# Patient Record
Sex: MALE | Race: WHITE | NOT HISPANIC OR LATINO | Employment: OTHER | URBAN - METROPOLITAN AREA
[De-identification: names, ages, dates, MRNs, and addresses within clinical notes are randomized per-mention and may not be internally consistent; named-entity substitution may affect disease eponyms.]

---

## 2017-07-19 ENCOUNTER — RECORDS - HEALTHEAST (OUTPATIENT)
Dept: LAB | Facility: CLINIC | Age: 82
End: 2017-07-19

## 2017-07-19 LAB — AST SERPL W P-5'-P-CCNC: 35 U/L (ref 0–40)

## 2017-10-18 ENCOUNTER — RECORDS - HEALTHEAST (OUTPATIENT)
Dept: LAB | Facility: CLINIC | Age: 82
End: 2017-10-18

## 2017-10-18 LAB
CHOLEST SERPL-MCNC: 176 MG/DL
FASTING STATUS PATIENT QL REPORTED: NORMAL
HDLC SERPL-MCNC: 67 MG/DL
LDLC SERPL CALC-MCNC: 104 MG/DL
TRIGL SERPL-MCNC: 27 MG/DL

## 2018-07-31 ENCOUNTER — RECORDS - HEALTHEAST (OUTPATIENT)
Dept: LAB | Facility: CLINIC | Age: 83
End: 2018-07-31

## 2018-07-31 LAB
ANION GAP SERPL CALCULATED.3IONS-SCNC: 10 MMOL/L (ref 5–18)
BUN SERPL-MCNC: 24 MG/DL (ref 8–28)
CALCIUM SERPL-MCNC: 9.2 MG/DL (ref 8.5–10.5)
CHLORIDE BLD-SCNC: 104 MMOL/L (ref 98–107)
CO2 SERPL-SCNC: 20 MMOL/L (ref 22–31)
CREAT SERPL-MCNC: 1.11 MG/DL (ref 0.7–1.3)
FERRITIN SERPL-MCNC: 31 NG/ML (ref 27–300)
GFR SERPL CREATININE-BSD FRML MDRD: >60 ML/MIN/1.73M2
GLUCOSE BLD-MCNC: 79 MG/DL (ref 70–125)
IRON SATN MFR SERPL: 13 % (ref 20–50)
IRON SERPL-MCNC: 39 UG/DL (ref 42–175)
POTASSIUM BLD-SCNC: 4.4 MMOL/L (ref 3.5–5)
SODIUM SERPL-SCNC: 134 MMOL/L (ref 136–145)
TIBC SERPL-MCNC: 311 UG/DL (ref 313–563)
TRANSFERRIN SERPL-MCNC: 249 MG/DL (ref 212–360)

## 2018-08-30 ENCOUNTER — RECORDS - HEALTHEAST (OUTPATIENT)
Dept: LAB | Facility: CLINIC | Age: 83
End: 2018-08-30

## 2018-08-30 LAB
ALBUMIN SERPL-MCNC: 3.5 G/DL (ref 3.5–5)
ALP SERPL-CCNC: 150 U/L (ref 45–120)
ALT SERPL W P-5'-P-CCNC: 21 U/L (ref 0–45)
ANION GAP SERPL CALCULATED.3IONS-SCNC: 9 MMOL/L (ref 5–18)
AST SERPL W P-5'-P-CCNC: 17 U/L (ref 0–40)
BILIRUB SERPL-MCNC: 0.6 MG/DL (ref 0–1)
BUN SERPL-MCNC: 19 MG/DL (ref 8–28)
CALCIUM SERPL-MCNC: 9.1 MG/DL (ref 8.5–10.5)
CHLORIDE BLD-SCNC: 104 MMOL/L (ref 98–107)
CO2 SERPL-SCNC: 24 MMOL/L (ref 22–31)
CREAT SERPL-MCNC: 0.98 MG/DL (ref 0.7–1.3)
GFR SERPL CREATININE-BSD FRML MDRD: >60 ML/MIN/1.73M2
GLUCOSE BLD-MCNC: 91 MG/DL (ref 70–125)
POTASSIUM BLD-SCNC: 4.3 MMOL/L (ref 3.5–5)
PROT SERPL-MCNC: 6.3 G/DL (ref 6–8)
SODIUM SERPL-SCNC: 137 MMOL/L (ref 136–145)

## 2018-09-06 ENCOUNTER — RECORDS - HEALTHEAST (OUTPATIENT)
Dept: LAB | Facility: CLINIC | Age: 83
End: 2018-09-06

## 2018-09-06 LAB
ANION GAP SERPL CALCULATED.3IONS-SCNC: 10 MMOL/L (ref 5–18)
BUN SERPL-MCNC: 24 MG/DL (ref 8–28)
CALCIUM SERPL-MCNC: 9.7 MG/DL (ref 8.5–10.5)
CHLORIDE BLD-SCNC: 101 MMOL/L (ref 98–107)
CO2 SERPL-SCNC: 26 MMOL/L (ref 22–31)
CREAT SERPL-MCNC: 1.2 MG/DL (ref 0.7–1.3)
GFR SERPL CREATININE-BSD FRML MDRD: 58 ML/MIN/1.73M2
GLUCOSE BLD-MCNC: 100 MG/DL (ref 70–125)
POTASSIUM BLD-SCNC: 4.5 MMOL/L (ref 3.5–5)
SODIUM SERPL-SCNC: 137 MMOL/L (ref 136–145)

## 2018-09-26 ENCOUNTER — RECORDS - HEALTHEAST (OUTPATIENT)
Dept: LAB | Facility: CLINIC | Age: 83
End: 2018-09-26

## 2018-09-26 LAB
ANION GAP SERPL CALCULATED.3IONS-SCNC: 10 MMOL/L (ref 5–18)
BUN SERPL-MCNC: 25 MG/DL (ref 8–28)
CALCIUM SERPL-MCNC: 9.7 MG/DL (ref 8.5–10.5)
CHLORIDE BLD-SCNC: 102 MMOL/L (ref 98–107)
CO2 SERPL-SCNC: 24 MMOL/L (ref 22–31)
CREAT SERPL-MCNC: 1.4 MG/DL (ref 0.7–1.3)
GFR SERPL CREATININE-BSD FRML MDRD: 48 ML/MIN/1.73M2
GLUCOSE BLD-MCNC: 87 MG/DL (ref 70–125)
POTASSIUM BLD-SCNC: 4.8 MMOL/L (ref 3.5–5)
SODIUM SERPL-SCNC: 136 MMOL/L (ref 136–145)

## 2018-10-09 ENCOUNTER — RECORDS - HEALTHEAST (OUTPATIENT)
Dept: LAB | Facility: CLINIC | Age: 83
End: 2018-10-09

## 2018-10-09 LAB
ANION GAP SERPL CALCULATED.3IONS-SCNC: 8 MMOL/L (ref 5–18)
BUN SERPL-MCNC: 21 MG/DL (ref 8–28)
CALCIUM SERPL-MCNC: 9.3 MG/DL (ref 8.5–10.5)
CHLORIDE BLD-SCNC: 102 MMOL/L (ref 98–107)
CO2 SERPL-SCNC: 27 MMOL/L (ref 22–31)
CREAT SERPL-MCNC: 1.29 MG/DL (ref 0.7–1.3)
GFR SERPL CREATININE-BSD FRML MDRD: 53 ML/MIN/1.73M2
GLUCOSE BLD-MCNC: 85 MG/DL (ref 70–125)
POTASSIUM BLD-SCNC: 4.5 MMOL/L (ref 3.5–5)
SODIUM SERPL-SCNC: 137 MMOL/L (ref 136–145)

## 2018-11-21 ENCOUNTER — RECORDS - HEALTHEAST (OUTPATIENT)
Dept: LAB | Facility: CLINIC | Age: 83
End: 2018-11-21

## 2018-11-21 LAB
ALBUMIN SERPL-MCNC: 3.5 G/DL (ref 3.5–5)
ALP SERPL-CCNC: 128 U/L (ref 45–120)
ALT SERPL W P-5'-P-CCNC: 18 U/L (ref 0–45)
ANION GAP SERPL CALCULATED.3IONS-SCNC: 12 MMOL/L (ref 5–18)
AST SERPL W P-5'-P-CCNC: 20 U/L (ref 0–40)
BILIRUB SERPL-MCNC: 0.5 MG/DL (ref 0–1)
BUN SERPL-MCNC: 24 MG/DL (ref 8–28)
CALCIUM SERPL-MCNC: 9.3 MG/DL (ref 8.5–10.5)
CHLORIDE BLD-SCNC: 104 MMOL/L (ref 98–107)
CO2 SERPL-SCNC: 24 MMOL/L (ref 22–31)
CREAT SERPL-MCNC: 1.19 MG/DL (ref 0.7–1.3)
GFR SERPL CREATININE-BSD FRML MDRD: 58 ML/MIN/1.73M2
GLUCOSE BLD-MCNC: 85 MG/DL (ref 70–125)
POTASSIUM BLD-SCNC: 3.9 MMOL/L (ref 3.5–5)
PROT SERPL-MCNC: 6.8 G/DL (ref 6–8)
SODIUM SERPL-SCNC: 140 MMOL/L (ref 136–145)

## 2019-01-16 ENCOUNTER — RECORDS - HEALTHEAST (OUTPATIENT)
Dept: LAB | Facility: CLINIC | Age: 84
End: 2019-01-16

## 2019-01-16 LAB
ANION GAP SERPL CALCULATED.3IONS-SCNC: 10 MMOL/L (ref 5–18)
BUN SERPL-MCNC: 22 MG/DL (ref 8–28)
CALCIUM SERPL-MCNC: 9.3 MG/DL (ref 8.5–10.5)
CHLORIDE BLD-SCNC: 102 MMOL/L (ref 98–107)
CO2 SERPL-SCNC: 25 MMOL/L (ref 22–31)
CREAT SERPL-MCNC: 1.15 MG/DL (ref 0.7–1.3)
GFR SERPL CREATININE-BSD FRML MDRD: >60 ML/MIN/1.73M2
GLUCOSE BLD-MCNC: 74 MG/DL (ref 70–125)
POTASSIUM BLD-SCNC: 4.5 MMOL/L (ref 3.5–5)
SODIUM SERPL-SCNC: 137 MMOL/L (ref 136–145)

## 2019-02-15 ENCOUNTER — RECORDS - HEALTHEAST (OUTPATIENT)
Dept: LAB | Facility: CLINIC | Age: 84
End: 2019-02-15

## 2019-02-15 LAB
ANION GAP SERPL CALCULATED.3IONS-SCNC: 11 MMOL/L (ref 5–18)
BUN SERPL-MCNC: 21 MG/DL (ref 8–28)
CALCIUM SERPL-MCNC: 9.1 MG/DL (ref 8.5–10.5)
CHLORIDE BLD-SCNC: 104 MMOL/L (ref 98–107)
CO2 SERPL-SCNC: 21 MMOL/L (ref 22–31)
CREAT SERPL-MCNC: 1.3 MG/DL (ref 0.7–1.3)
GFR SERPL CREATININE-BSD FRML MDRD: 53 ML/MIN/1.73M2
GLUCOSE BLD-MCNC: 95 MG/DL (ref 70–125)
POTASSIUM BLD-SCNC: 4.3 MMOL/L (ref 3.5–5)
SODIUM SERPL-SCNC: 136 MMOL/L (ref 136–145)

## 2019-05-17 ENCOUNTER — RECORDS - HEALTHEAST (OUTPATIENT)
Dept: LAB | Facility: CLINIC | Age: 84
End: 2019-05-17

## 2019-05-17 LAB
ANION GAP SERPL CALCULATED.3IONS-SCNC: 14 MMOL/L (ref 5–18)
BUN SERPL-MCNC: 22 MG/DL (ref 8–28)
CALCIUM SERPL-MCNC: 9.7 MG/DL (ref 8.5–10.5)
CHLORIDE BLD-SCNC: 98 MMOL/L (ref 98–107)
CO2 SERPL-SCNC: 20 MMOL/L (ref 22–31)
CREAT SERPL-MCNC: 1.29 MG/DL (ref 0.7–1.3)
GFR SERPL CREATININE-BSD FRML MDRD: 53 ML/MIN/1.73M2
GLUCOSE BLD-MCNC: 78 MG/DL (ref 70–125)
POTASSIUM BLD-SCNC: 4.6 MMOL/L (ref 3.5–5)
SODIUM SERPL-SCNC: 132 MMOL/L (ref 136–145)

## 2019-06-12 ENCOUNTER — RECORDS - HEALTHEAST (OUTPATIENT)
Dept: LAB | Facility: CLINIC | Age: 84
End: 2019-06-12

## 2019-06-12 LAB
APPEARANCE FLD: ABNORMAL
COLOR FLD: ABNORMAL
CRYSTALS SNV MICRO: NORMAL
EOSINOPHIL NFR FLD MANUAL: ABNORMAL %
LYMPHOCYTES NFR FLD MANUAL: 28 %
MACROPHAGE % - HISTORICAL: ABNORMAL %
MESOTHELIALS, FLUID: ABNORMAL %
MONOCYTE % - HISTORICAL: 2 %
NEUTS BAND NFR FLD MANUAL: 70 %
OTHER CELLS FLD MANUAL: ABNORMAL %
RBC FLUID - HISTORICAL: ABNORMAL /UL
WBC # FLD AUTO: 371 /UL (ref 0–99)

## 2019-06-15 LAB
BACTERIA SPEC CULT: NO GROWTH
BACTERIA SPEC CULT: NORMAL
GRAM STAIN RESULT: NORMAL
GRAM STAIN RESULT: NORMAL

## 2019-11-04 ENCOUNTER — RECORDS - HEALTHEAST (OUTPATIENT)
Dept: LAB | Facility: CLINIC | Age: 84
End: 2019-11-04

## 2019-11-04 LAB
ANION GAP SERPL CALCULATED.3IONS-SCNC: 9 MMOL/L (ref 5–18)
BUN SERPL-MCNC: 24 MG/DL (ref 8–28)
CALCIUM SERPL-MCNC: 8.7 MG/DL (ref 8.5–10.5)
CHLORIDE BLD-SCNC: 109 MMOL/L (ref 98–107)
CO2 SERPL-SCNC: 24 MMOL/L (ref 22–31)
CREAT SERPL-MCNC: 1.69 MG/DL (ref 0.7–1.3)
ERYTHROCYTE [DISTWIDTH] IN BLOOD BY AUTOMATED COUNT: 16.7 % (ref 11–14.5)
GFR SERPL CREATININE-BSD FRML MDRD: 39 ML/MIN/1.73M2
GLUCOSE BLD-MCNC: 94 MG/DL (ref 70–125)
HCT VFR BLD AUTO: 20.7 % (ref 40–54)
HGB BLD-MCNC: 6.4 G/DL (ref 14–18)
MCH RBC QN AUTO: 25.8 PG (ref 27–34)
MCHC RBC AUTO-ENTMCNC: 30.9 G/DL (ref 32–36)
MCV RBC AUTO: 84 FL (ref 80–100)
PLATELET # BLD AUTO: 411 THOU/UL (ref 140–440)
PMV BLD AUTO: 9.6 FL (ref 8.5–12.5)
POTASSIUM BLD-SCNC: 4.9 MMOL/L (ref 3.5–5)
RBC # BLD AUTO: 2.48 MILL/UL (ref 4.4–6.2)
SODIUM SERPL-SCNC: 142 MMOL/L (ref 136–145)
WBC: 6.9 THOU/UL (ref 4–11)

## 2019-11-18 ENCOUNTER — RECORDS - HEALTHEAST (OUTPATIENT)
Dept: LAB | Facility: CLINIC | Age: 84
End: 2019-11-18

## 2019-11-18 LAB
ANION GAP SERPL CALCULATED.3IONS-SCNC: 12 MMOL/L (ref 5–18)
BUN SERPL-MCNC: 33 MG/DL (ref 8–28)
CALCIUM SERPL-MCNC: 9.1 MG/DL (ref 8.5–10.5)
CHLORIDE BLD-SCNC: 102 MMOL/L (ref 98–107)
CO2 SERPL-SCNC: 24 MMOL/L (ref 22–31)
CREAT SERPL-MCNC: 2.16 MG/DL (ref 0.7–1.3)
GFR SERPL CREATININE-BSD FRML MDRD: 29 ML/MIN/1.73M2
GLUCOSE BLD-MCNC: 94 MG/DL (ref 70–125)
POTASSIUM BLD-SCNC: 4.5 MMOL/L (ref 3.5–5)
SODIUM SERPL-SCNC: 138 MMOL/L (ref 136–145)

## 2020-01-23 ENCOUNTER — RECORDS - HEALTHEAST (OUTPATIENT)
Dept: LAB | Facility: CLINIC | Age: 85
End: 2020-01-23

## 2020-01-23 ENCOUNTER — RECORDS - HEALTHEAST (OUTPATIENT)
Dept: ADMINISTRATIVE | Facility: OTHER | Age: 85
End: 2020-01-23

## 2020-01-23 LAB
ANION GAP SERPL CALCULATED.3IONS-SCNC: 13 MMOL/L (ref 5–18)
BUN SERPL-MCNC: 22 MG/DL (ref 8–28)
CALCIUM SERPL-MCNC: 8.8 MG/DL (ref 8.5–10.5)
CHLORIDE BLD-SCNC: 102 MMOL/L (ref 98–107)
CO2 SERPL-SCNC: 23 MMOL/L (ref 22–31)
CREAT SERPL-MCNC: 1.54 MG/DL (ref 0.7–1.3)
GFR SERPL CREATININE-BSD FRML MDRD: 43 ML/MIN/1.73M2
GLUCOSE BLD-MCNC: 100 MG/DL (ref 70–125)
POTASSIUM BLD-SCNC: 3.8 MMOL/L (ref 3.5–5)
SODIUM SERPL-SCNC: 138 MMOL/L (ref 136–145)

## 2020-02-18 ENCOUNTER — RECORDS - HEALTHEAST (OUTPATIENT)
Dept: ADMINISTRATIVE | Facility: OTHER | Age: 85
End: 2020-02-18

## 2020-03-04 ENCOUNTER — RECORDS - HEALTHEAST (OUTPATIENT)
Dept: ADMINISTRATIVE | Facility: OTHER | Age: 85
End: 2020-03-04

## 2020-03-12 ENCOUNTER — HOSPITAL ENCOUNTER (OUTPATIENT)
Dept: ULTRASOUND IMAGING | Facility: CLINIC | Age: 85
Discharge: HOME | End: 2020-03-12
Attending: INTERNAL MEDICINE

## 2020-03-12 DIAGNOSIS — E87.6 HYPOKALEMIA: ICD-10-CM

## 2020-03-12 DIAGNOSIS — R60.0 EDEMA LEG: ICD-10-CM

## 2020-03-12 DIAGNOSIS — N18.30 CHRONIC KIDNEY DISEASE, STAGE III (MODERATE) (H): ICD-10-CM

## 2020-03-12 DIAGNOSIS — M25.462 KNEE EFFUSION, LEFT: ICD-10-CM

## 2020-03-12 DIAGNOSIS — D50.9 IRON DEFICIENCY ANEMIA: ICD-10-CM

## 2020-04-29 ENCOUNTER — RECORDS - HEALTHEAST (OUTPATIENT)
Dept: LAB | Facility: CLINIC | Age: 85
End: 2020-04-29

## 2020-04-29 ENCOUNTER — RECORDS - HEALTHEAST (OUTPATIENT)
Dept: ADMINISTRATIVE | Facility: OTHER | Age: 85
End: 2020-04-29

## 2020-04-29 LAB
ANION GAP SERPL CALCULATED.3IONS-SCNC: 14 MMOL/L (ref 5–18)
BUN SERPL-MCNC: 24 MG/DL (ref 8–28)
CALCIUM SERPL-MCNC: 8.9 MG/DL (ref 8.5–10.5)
CHLORIDE BLD-SCNC: 103 MMOL/L (ref 98–107)
CO2 SERPL-SCNC: 21 MMOL/L (ref 22–31)
CREAT SERPL-MCNC: 1.45 MG/DL (ref 0.7–1.3)
GFR SERPL CREATININE-BSD FRML MDRD: 46 ML/MIN/1.73M2
GLUCOSE BLD-MCNC: 83 MG/DL (ref 70–125)
POTASSIUM BLD-SCNC: 4.2 MMOL/L (ref 3.5–5)
SODIUM SERPL-SCNC: 138 MMOL/L (ref 136–145)

## 2020-05-01 LAB
ALBUMIN PERCENT: 52.3 % (ref 51–67)
ALBUMIN SERPL ELPH-MCNC: 3.8 G/DL (ref 3.2–4.7)
ALPHA 1 PERCENT: 4 % (ref 2–4)
ALPHA 2 PERCENT: 14.2 % (ref 5–13)
ALPHA1 GLOB SERPL ELPH-MCNC: 0.3 G/DL (ref 0.1–0.3)
ALPHA2 GLOB SERPL ELPH-MCNC: 1 G/DL (ref 0.4–0.9)
B-GLOBULIN SERPL ELPH-MCNC: 0.8 G/DL (ref 0.7–1.2)
BETA PERCENT: 11.4 % (ref 10–17)
GAMMA GLOB SERPL ELPH-MCNC: 1.3 G/DL (ref 0.6–1.4)
GAMMA GLOBULIN PERCENT: 18.1 % (ref 9–20)
M PROTEIN SERPL ELPH-MCNC: 0.7 G/DL
PATH ICD:: ABNORMAL
PROT PATTERN SERPL ELPH-IMP: ABNORMAL
PROT SERPL-MCNC: 7.2 G/DL (ref 6–8)
REVIEWING PATHOLOGIST: ABNORMAL

## 2020-05-28 ENCOUNTER — COMMUNICATION - HEALTHEAST (OUTPATIENT)
Dept: ADMINISTRATIVE | Facility: HOSPITAL | Age: 85
End: 2020-05-28

## 2020-06-29 ENCOUNTER — COMMUNICATION - HEALTHEAST (OUTPATIENT)
Dept: ONCOLOGY | Facility: CLINIC | Age: 85
End: 2020-06-29

## 2020-06-29 ENCOUNTER — HOSPITAL ENCOUNTER (OUTPATIENT)
Dept: RADIOLOGY | Facility: CLINIC | Age: 85
Setting detail: RADIATION/ONCOLOGY SERIES
Discharge: STILL ADMITTED OR ADMINISTRATIVE | End: 2020-06-29
Attending: INTERNAL MEDICINE

## 2020-06-29 ENCOUNTER — OFFICE VISIT - HEALTHEAST (OUTPATIENT)
Dept: ONCOLOGY | Facility: CLINIC | Age: 85
End: 2020-06-29

## 2020-06-29 DIAGNOSIS — D47.2 MGUS (MONOCLONAL GAMMOPATHY OF UNKNOWN SIGNIFICANCE): ICD-10-CM

## 2020-06-29 DIAGNOSIS — D64.9 NORMOCHROMIC NORMOCYTIC ANEMIA: ICD-10-CM

## 2020-06-29 DIAGNOSIS — N18.30 CKD (CHRONIC KIDNEY DISEASE) STAGE 3, GFR 30-59 ML/MIN (H): ICD-10-CM

## 2020-06-29 LAB
ALBUMIN SERPL-MCNC: 3.3 G/DL (ref 3.5–5)
ALP SERPL-CCNC: 147 U/L (ref 45–120)
ALT SERPL W P-5'-P-CCNC: 10 U/L (ref 0–45)
ANION GAP SERPL CALCULATED.3IONS-SCNC: 13 MMOL/L (ref 5–18)
AST SERPL W P-5'-P-CCNC: 15 U/L (ref 0–40)
BASOPHILS # BLD AUTO: 0 THOU/UL (ref 0–0.2)
BASOPHILS NFR BLD AUTO: 0 % (ref 0–2)
BILIRUB SERPL-MCNC: 0.7 MG/DL (ref 0–1)
BUN SERPL-MCNC: 20 MG/DL (ref 8–28)
CALCIUM SERPL-MCNC: 8.7 MG/DL (ref 8.5–10.5)
CHLORIDE BLD-SCNC: 101 MMOL/L (ref 98–107)
CO2 SERPL-SCNC: 29 MMOL/L (ref 22–31)
CREAT SERPL-MCNC: 1.38 MG/DL (ref 0.7–1.3)
EOSINOPHIL # BLD AUTO: 0.2 THOU/UL (ref 0–0.4)
EOSINOPHIL NFR BLD AUTO: 4 % (ref 0–6)
ERYTHROCYTE [DISTWIDTH] IN BLOOD BY AUTOMATED COUNT: 16.2 % (ref 11–14.5)
FERRITIN SERPL-MCNC: 22 NG/ML (ref 27–300)
FOLATE SERPL-MCNC: 9.9 NG/ML
GFR SERPL CREATININE-BSD FRML MDRD: 49 ML/MIN/1.73M2
GLUCOSE BLD-MCNC: 86 MG/DL (ref 70–125)
HCT VFR BLD AUTO: 31.4 % (ref 40–54)
HGB BLD-MCNC: 10.3 G/DL (ref 14–18)
IGA SERPL-MCNC: 1369 MG/DL
IGA SERPL-MCNC: 93 MG/DL (ref 65–400)
IGM SERPL-MCNC: 58 MG/DL (ref 60–280)
LDH SERPL L TO P-CCNC: 210 U/L (ref 125–220)
LYMPHOCYTES # BLD AUTO: 1.3 THOU/UL (ref 0.8–4.4)
LYMPHOCYTES NFR BLD AUTO: 21 % (ref 20–40)
MCH RBC QN AUTO: 30.8 PG (ref 27–34)
MCHC RBC AUTO-ENTMCNC: 32.8 G/DL (ref 32–36)
MCV RBC AUTO: 94 FL (ref 80–100)
MONOCYTES # BLD AUTO: 0.6 THOU/UL (ref 0–0.9)
MONOCYTES NFR BLD AUTO: 10 % (ref 2–10)
NEUTROPHILS # BLD AUTO: 4.2 THOU/UL (ref 2–7.7)
NEUTROPHILS NFR BLD AUTO: 66 % (ref 50–70)
PATH REPORT.MICROSCOPIC SPEC OTHER STN: ABNORMAL
PLATELET # BLD AUTO: 304 THOU/UL (ref 140–440)
PMV BLD AUTO: 9.8 FL (ref 8.5–12.5)
POTASSIUM BLD-SCNC: 2.9 MMOL/L (ref 3.5–5)
PROT SERPL-MCNC: 7.4 G/DL (ref 6–8)
RBC # BLD AUTO: 3.34 MILL/UL (ref 4.4–6.2)
RETICS # AUTO: 0.05 MILL/UL (ref 0.01–0.11)
RETICS/RBC NFR AUTO: 1.58 % (ref 0.8–2.7)
SODIUM SERPL-SCNC: 143 MMOL/L (ref 136–145)
TSH SERPL DL<=0.005 MIU/L-ACNC: 1.59 UIU/ML (ref 0.3–5)
VIT B12 SERPL-MCNC: 171 PG/ML (ref 213–816)
WBC: 6.3 THOU/UL (ref 4–11)

## 2020-06-29 RX ORDER — TORSEMIDE 20 MG/1
20 TABLET ORAL DAILY
Status: SHIPPED | COMMUNITY
Start: 2020-05-08 | End: 2021-01-01 | Stop reason: CLARIF

## 2020-06-29 RX ORDER — ESCITALOPRAM OXALATE 20 MG/1
20 TABLET ORAL DAILY
Status: SHIPPED | COMMUNITY
Start: 2020-06-22 | End: 2021-01-01

## 2020-06-29 RX ORDER — FERROUS SULFATE 325(65) MG
1 TABLET ORAL 2 TIMES DAILY
Status: SHIPPED | COMMUNITY
Start: 2020-01-23 | End: 2021-01-01

## 2020-06-29 ASSESSMENT — MIFFLIN-ST. JEOR: SCORE: 1466.2

## 2020-06-30 LAB
LAB AP CHARGES (HE HISTORICAL CONVERSION): NORMAL
PATH REPORT.COMMENTS IMP SPEC: NORMAL
PATH REPORT.COMMENTS IMP SPEC: NORMAL
PATH REPORT.FINAL DX SPEC: NORMAL
PATH REPORT.MICROSCOPIC SPEC OTHER STN: NORMAL
PATH REPORT.RELEVANT HX SPEC: NORMAL

## 2020-07-01 LAB
KAPPA LC FREE SER-MCNC: 5.87 MG/DL (ref 0.33–1.94)
KAPPA LC FREE/LAMBDA FREE SER NEPH: 3.65 {RATIO} (ref 0.26–1.65)
LAMBDA LC FREE SERPL-MCNC: 1.61 MG/DL (ref 0.57–2.63)
PATH ICD:: NORMAL
PROT PATTERN SERPL IFE-IMP: NORMAL
REVIEWING PATHOLOGIST: NORMAL

## 2020-07-02 LAB
ALBUMIN PERCENT: 54.3 % (ref 51–67)
ALBUMIN SERPL ELPH-MCNC: 3.6 G/DL (ref 3.2–4.7)
ALPHA 1 PERCENT: 4 % (ref 2–4)
ALPHA 2 PERCENT: 13.8 % (ref 5–13)
ALPHA1 GLOB SERPL ELPH-MCNC: 0.3 G/DL (ref 0.1–0.3)
ALPHA2 GLOB SERPL ELPH-MCNC: 0.9 G/DL (ref 0.4–0.9)
B-GLOBULIN SERPL ELPH-MCNC: 0.7 G/DL (ref 0.7–1.2)
BETA PERCENT: 11.1 % (ref 10–17)
GAMMA GLOB SERPL ELPH-MCNC: 1.1 G/DL (ref 0.6–1.4)
GAMMA GLOBULIN PERCENT: 16.8 % (ref 9–20)
PATH ICD:: ABNORMAL
PROT PATTERN SERPL ELPH-IMP: ABNORMAL
PROT SERPL-MCNC: 6.6 G/DL (ref 6–8)
REVIEWING PATHOLOGIST: ABNORMAL

## 2020-07-06 ENCOUNTER — OFFICE VISIT - HEALTHEAST (OUTPATIENT)
Dept: ONCOLOGY | Facility: CLINIC | Age: 85
End: 2020-07-06

## 2020-07-06 DIAGNOSIS — D64.9 NORMOCHROMIC NORMOCYTIC ANEMIA: ICD-10-CM

## 2020-07-06 DIAGNOSIS — D47.2 MGUS (MONOCLONAL GAMMOPATHY OF UNKNOWN SIGNIFICANCE): ICD-10-CM

## 2020-07-06 DIAGNOSIS — D51.8 OTHER VITAMIN B12 DEFICIENCY ANEMIA: ICD-10-CM

## 2020-07-06 DIAGNOSIS — N18.30 CKD (CHRONIC KIDNEY DISEASE) STAGE 3, GFR 30-59 ML/MIN (H): ICD-10-CM

## 2020-07-06 LAB
KAPPA LC UR-MCNC: <0.9 MG/DL
KAPPA LC/LAMBDA UR: NORMAL {RATIO} (ref 0.7–6.2)
LAMBDA LC UR-MCNC: <0.7 MG/DL
MONOCLONAL PEAK TIMED URINE: 4 MG/24HR
PATH ICD:: NORMAL
PROT PATTERN SERPL ELPH-IMP: NORMAL
PROTEIN TOTAL TIMED URINE MG/SPEC LHE: 138 MG/24HR (ref 0–150)
REVIEWING PATHOLOGIST: NORMAL
SPECIMEN VOL UR: 1725 ML

## 2020-07-07 LAB
PATH ICD:: NORMAL
PROT ELPH PNL UR ELPH: NORMAL
REVIEWING PATHOLOGIST: NORMAL

## 2020-07-14 ENCOUNTER — AMBULATORY - HEALTHEAST (OUTPATIENT)
Dept: INFUSION THERAPY | Facility: CLINIC | Age: 85
End: 2020-07-14

## 2020-07-14 ENCOUNTER — AMBULATORY - HEALTHEAST (OUTPATIENT)
Dept: LAB | Facility: CLINIC | Age: 85
End: 2020-07-14

## 2020-07-14 DIAGNOSIS — N18.30 CKD (CHRONIC KIDNEY DISEASE) STAGE 3, GFR 30-59 ML/MIN (H): ICD-10-CM

## 2020-07-14 DIAGNOSIS — D47.2 MGUS (MONOCLONAL GAMMOPATHY OF UNKNOWN SIGNIFICANCE): ICD-10-CM

## 2020-07-14 DIAGNOSIS — D64.9 NORMOCHROMIC NORMOCYTIC ANEMIA: ICD-10-CM

## 2020-07-14 LAB
BASOPHILS # BLD AUTO: 0 THOU/UL (ref 0–0.2)
BASOPHILS NFR BLD AUTO: 1 % (ref 0–2)
EOSINOPHIL # BLD AUTO: 0.3 THOU/UL (ref 0–0.4)
EOSINOPHIL NFR BLD AUTO: 4 % (ref 0–6)
ERYTHROCYTE [DISTWIDTH] IN BLOOD BY AUTOMATED COUNT: 16 % (ref 11–14.5)
HCT VFR BLD AUTO: 31.4 % (ref 40–54)
HGB BLD-MCNC: 10.4 G/DL (ref 14–18)
LYMPHOCYTES # BLD AUTO: 1.1 THOU/UL (ref 0.8–4.4)
LYMPHOCYTES NFR BLD AUTO: 17 % (ref 20–40)
MCH RBC QN AUTO: 30.9 PG (ref 27–34)
MCHC RBC AUTO-ENTMCNC: 33.1 G/DL (ref 32–36)
MCV RBC AUTO: 93 FL (ref 80–100)
MONOCYTES # BLD AUTO: 0.7 THOU/UL (ref 0–0.9)
MONOCYTES NFR BLD AUTO: 12 % (ref 2–10)
NEUTROPHILS # BLD AUTO: 4 THOU/UL (ref 2–7.7)
NEUTROPHILS NFR BLD AUTO: 66 % (ref 50–70)
PLATELET # BLD AUTO: 331 THOU/UL (ref 140–440)
PMV BLD AUTO: 9.9 FL (ref 8.5–12.5)
RBC # BLD AUTO: 3.37 MILL/UL (ref 4.4–6.2)
WBC: 6.1 THOU/UL (ref 4–11)

## 2020-07-16 LAB
LAB AP CHARGES (HE HISTORICAL CONVERSION): NORMAL
PATH REPORT.COMMENTS IMP SPEC: NORMAL
PATH REPORT.FINAL DX SPEC: NORMAL
PATH REPORT.MICROSCOPIC SPEC OTHER STN: NORMAL
RESULT FLAG (HE HISTORICAL CONVERSION): NORMAL

## 2020-07-24 LAB
MISCELLANEOUS TEST DEPT. - HE HISTORICAL: NORMAL
PERFORMING LAB: NORMAL
SPECIMEN STATUS: NORMAL
SPECIMEN STATUS: NORMAL
TEST NAME: NORMAL

## 2020-07-27 ENCOUNTER — OFFICE VISIT - HEALTHEAST (OUTPATIENT)
Dept: ONCOLOGY | Facility: CLINIC | Age: 85
End: 2020-07-27

## 2020-07-27 DIAGNOSIS — N18.30 CKD (CHRONIC KIDNEY DISEASE) STAGE 3, GFR 30-59 ML/MIN (H): ICD-10-CM

## 2020-07-27 DIAGNOSIS — D64.9 NORMOCHROMIC NORMOCYTIC ANEMIA: ICD-10-CM

## 2020-07-27 DIAGNOSIS — D47.2 MGUS (MONOCLONAL GAMMOPATHY OF UNKNOWN SIGNIFICANCE): ICD-10-CM

## 2020-07-28 LAB
BKR LAB AP CORE BIOPSY/ASPIRATE CLOT: ABNORMAL
LAB AP ASPIRATE SMEAR (HE HISTORICAL CONVERSION): ABNORMAL
LAB AP BONE MARROW DIFF (HE HISTORICAL CONVERSION): ABNORMAL
LAB AP CHARGES (HE HISTORICAL CONVERSION): ABNORMAL
PATH REPORT.ADDENDUM SPEC: ABNORMAL
PATH REPORT.COMMENTS IMP SPEC: ABNORMAL
PATH REPORT.FINAL DX SPEC: ABNORMAL
PATH REPORT.MICROSCOPIC SPEC OTHER STN: ABNORMAL
PATH REPORT.MICROSCOPIC SPEC OTHER STN: ABNORMAL
PATH REPORT.RELEVANT HX SPEC: ABNORMAL
RESULT FLAG (HE HISTORICAL CONVERSION): ABNORMAL

## 2020-10-07 ENCOUNTER — RECORDS - HEALTHEAST (OUTPATIENT)
Dept: LAB | Facility: CLINIC | Age: 85
End: 2020-10-07

## 2020-10-07 LAB
ANION GAP SERPL CALCULATED.3IONS-SCNC: 9 MMOL/L (ref 5–18)
BUN SERPL-MCNC: 21 MG/DL (ref 8–28)
CALCIUM SERPL-MCNC: 8.4 MG/DL (ref 8.5–10.5)
CHLORIDE BLD-SCNC: 104 MMOL/L (ref 98–107)
CHOLEST SERPL-MCNC: 156 MG/DL
CO2 SERPL-SCNC: 29 MMOL/L (ref 22–31)
CREAT SERPL-MCNC: 1.35 MG/DL (ref 0.7–1.3)
FASTING STATUS PATIENT QL REPORTED: YES
GFR SERPL CREATININE-BSD FRML MDRD: 50 ML/MIN/1.73M2
GLUCOSE BLD-MCNC: 94 MG/DL (ref 70–125)
HDLC SERPL-MCNC: 55 MG/DL
LDLC SERPL CALC-MCNC: 91 MG/DL
POTASSIUM BLD-SCNC: 3.1 MMOL/L (ref 3.5–5)
SODIUM SERPL-SCNC: 142 MMOL/L (ref 136–145)
TRIGL SERPL-MCNC: 52 MG/DL

## 2021-01-01 ENCOUNTER — RECORDS - HEALTHEAST (OUTPATIENT)
Dept: ADMINISTRATIVE | Facility: CLINIC | Age: 86
End: 2021-01-01

## 2021-01-01 ENCOUNTER — APPOINTMENT (OUTPATIENT)
Dept: RADIOLOGY | Facility: CLINIC | Age: 86
DRG: 862 | End: 2021-01-01
Attending: EMERGENCY MEDICINE
Payer: COMMERCIAL

## 2021-01-01 ENCOUNTER — LAB REQUISITION (OUTPATIENT)
Dept: LAB | Facility: CLINIC | Age: 86
End: 2021-01-01
Payer: COMMERCIAL

## 2021-01-01 ENCOUNTER — TRANSITIONAL CARE UNIT VISIT (OUTPATIENT)
Dept: GERIATRICS | Facility: CLINIC | Age: 86
End: 2021-01-01
Payer: COMMERCIAL

## 2021-01-01 ENCOUNTER — DISCHARGE SUMMARY NURSING HOME (OUTPATIENT)
Dept: GERIATRICS | Facility: CLINIC | Age: 86
End: 2021-01-01
Payer: COMMERCIAL

## 2021-01-01 ENCOUNTER — APPOINTMENT (OUTPATIENT)
Dept: PHYSICAL THERAPY | Facility: CLINIC | Age: 86
DRG: 470 | End: 2021-01-01
Attending: ORTHOPAEDIC SURGERY
Payer: COMMERCIAL

## 2021-01-01 ENCOUNTER — DOCUMENTATION ONLY (OUTPATIENT)
Dept: OTHER | Facility: CLINIC | Age: 86
End: 2021-01-01

## 2021-01-01 ENCOUNTER — COMMUNICATION - HEALTHEAST (OUTPATIENT)
Dept: SCHEDULING | Facility: CLINIC | Age: 86
End: 2021-01-01

## 2021-01-01 ENCOUNTER — RECORDS - HEALTHEAST (OUTPATIENT)
Dept: ADMINISTRATIVE | Facility: OTHER | Age: 86
End: 2021-01-01

## 2021-01-01 ENCOUNTER — TELEPHONE (OUTPATIENT)
Dept: GERIATRICS | Facility: CLINIC | Age: 86
End: 2021-01-01

## 2021-01-01 ENCOUNTER — AMBULATORY - HEALTHEAST (OUTPATIENT)
Dept: SURGERY | Facility: CLINIC | Age: 86
End: 2021-01-01

## 2021-01-01 ENCOUNTER — RECORDS - HEALTHEAST (OUTPATIENT)
Dept: LAB | Facility: CLINIC | Age: 86
End: 2021-01-01

## 2021-01-01 ENCOUNTER — APPOINTMENT (OUTPATIENT)
Dept: ULTRASOUND IMAGING | Facility: CLINIC | Age: 86
DRG: 862 | End: 2021-01-01
Attending: EMERGENCY MEDICINE
Payer: COMMERCIAL

## 2021-01-01 ENCOUNTER — HOSPITAL ENCOUNTER (INPATIENT)
Facility: CLINIC | Age: 86
LOS: 4 days | Discharge: NURSING, REHAB OR LONG-TERM CARE | DRG: 862 | End: 2021-07-27
Attending: EMERGENCY MEDICINE | Admitting: FAMILY MEDICINE
Payer: COMMERCIAL

## 2021-01-01 ENCOUNTER — APPOINTMENT (OUTPATIENT)
Dept: OCCUPATIONAL THERAPY | Facility: CLINIC | Age: 86
DRG: 470 | End: 2021-01-01
Attending: ORTHOPAEDIC SURGERY
Payer: COMMERCIAL

## 2021-01-01 ENCOUNTER — SURGERY - HEALTHEAST (OUTPATIENT)
Dept: SURGERY | Facility: CLINIC | Age: 86
End: 2021-01-01

## 2021-01-01 ENCOUNTER — APPOINTMENT (OUTPATIENT)
Dept: PHYSICAL THERAPY | Facility: CLINIC | Age: 86
DRG: 862 | End: 2021-01-01
Attending: FAMILY MEDICINE
Payer: COMMERCIAL

## 2021-01-01 ENCOUNTER — HOSPITAL ENCOUNTER (INPATIENT)
Facility: CLINIC | Age: 86
LOS: 6 days | Discharge: NURSING, REHAB OR LONG-TERM CARE | DRG: 470 | End: 2021-07-13
Attending: ORTHOPAEDIC SURGERY | Admitting: ORTHOPAEDIC SURGERY
Payer: COMMERCIAL

## 2021-01-01 ENCOUNTER — APPOINTMENT (OUTPATIENT)
Dept: PHYSICAL THERAPY | Facility: CLINIC | Age: 86
DRG: 862 | End: 2021-01-01
Payer: COMMERCIAL

## 2021-01-01 ENCOUNTER — AMBULATORY - HEALTHEAST (OUTPATIENT)
Dept: FAMILY MEDICINE | Facility: CLINIC | Age: 86
End: 2021-01-01

## 2021-01-01 ENCOUNTER — TRANSFERRED RECORDS (OUTPATIENT)
Dept: HEALTH INFORMATION MANAGEMENT | Facility: CLINIC | Age: 86
End: 2021-01-01

## 2021-01-01 ENCOUNTER — APPOINTMENT (OUTPATIENT)
Dept: OCCUPATIONAL THERAPY | Facility: CLINIC | Age: 86
DRG: 862 | End: 2021-01-01
Attending: FAMILY MEDICINE
Payer: COMMERCIAL

## 2021-01-01 VITALS
HEART RATE: 56 BPM | TEMPERATURE: 98.4 F | OXYGEN SATURATION: 92 % | DIASTOLIC BLOOD PRESSURE: 51 MMHG | RESPIRATION RATE: 18 BRPM | HEIGHT: 66 IN | BODY MASS INDEX: 23.53 KG/M2 | SYSTOLIC BLOOD PRESSURE: 137 MMHG | WEIGHT: 146.4 LBS

## 2021-01-01 VITALS
HEIGHT: 66 IN | DIASTOLIC BLOOD PRESSURE: 65 MMHG | SYSTOLIC BLOOD PRESSURE: 142 MMHG | WEIGHT: 160.2 LBS | OXYGEN SATURATION: 96 % | BODY MASS INDEX: 25.75 KG/M2 | RESPIRATION RATE: 16 BRPM | HEART RATE: 79 BPM | TEMPERATURE: 98.1 F

## 2021-01-01 VITALS
BODY MASS INDEX: 25.62 KG/M2 | TEMPERATURE: 97.8 F | WEIGHT: 159.4 LBS | SYSTOLIC BLOOD PRESSURE: 174 MMHG | HEIGHT: 66 IN | DIASTOLIC BLOOD PRESSURE: 86 MMHG | HEART RATE: 81 BPM | RESPIRATION RATE: 18 BRPM | OXYGEN SATURATION: 98 %

## 2021-01-01 VITALS
HEIGHT: 66 IN | OXYGEN SATURATION: 99 % | WEIGHT: 157 LBS | BODY MASS INDEX: 25.23 KG/M2 | TEMPERATURE: 98.2 F | DIASTOLIC BLOOD PRESSURE: 60 MMHG | SYSTOLIC BLOOD PRESSURE: 111 MMHG | RESPIRATION RATE: 18 BRPM | HEART RATE: 63 BPM

## 2021-01-01 VITALS
BODY MASS INDEX: 26.1 KG/M2 | RESPIRATION RATE: 18 BRPM | TEMPERATURE: 97.5 F | WEIGHT: 162.4 LBS | SYSTOLIC BLOOD PRESSURE: 118 MMHG | OXYGEN SATURATION: 96 % | DIASTOLIC BLOOD PRESSURE: 64 MMHG | HEART RATE: 81 BPM | HEIGHT: 66 IN

## 2021-01-01 VITALS
SYSTOLIC BLOOD PRESSURE: 107 MMHG | DIASTOLIC BLOOD PRESSURE: 48 MMHG | WEIGHT: 154.5 LBS | HEART RATE: 58 BPM | OXYGEN SATURATION: 96 % | HEIGHT: 66 IN | BODY MASS INDEX: 24.83 KG/M2 | RESPIRATION RATE: 16 BRPM | TEMPERATURE: 97.2 F

## 2021-01-01 VITALS
HEIGHT: 66 IN | TEMPERATURE: 98 F | RESPIRATION RATE: 16 BRPM | HEART RATE: 80 BPM | SYSTOLIC BLOOD PRESSURE: 130 MMHG | DIASTOLIC BLOOD PRESSURE: 68 MMHG | OXYGEN SATURATION: 96 % | WEIGHT: 173.06 LBS | BODY MASS INDEX: 27.81 KG/M2

## 2021-01-01 VITALS
SYSTOLIC BLOOD PRESSURE: 134 MMHG | TEMPERATURE: 96 F | WEIGHT: 160.3 LBS | BODY MASS INDEX: 25.76 KG/M2 | DIASTOLIC BLOOD PRESSURE: 48 MMHG | HEART RATE: 67 BPM | OXYGEN SATURATION: 96 % | RESPIRATION RATE: 16 BRPM | HEIGHT: 66 IN

## 2021-01-01 VITALS
RESPIRATION RATE: 18 BRPM | HEART RATE: 79 BPM | SYSTOLIC BLOOD PRESSURE: 129 MMHG | WEIGHT: 173.06 LBS | HEIGHT: 66 IN | DIASTOLIC BLOOD PRESSURE: 58 MMHG | BODY MASS INDEX: 27.81 KG/M2 | OXYGEN SATURATION: 94 % | TEMPERATURE: 98.7 F

## 2021-01-01 VITALS
DIASTOLIC BLOOD PRESSURE: 52 MMHG | TEMPERATURE: 97.2 F | SYSTOLIC BLOOD PRESSURE: 98 MMHG | RESPIRATION RATE: 16 BRPM | OXYGEN SATURATION: 97 % | BODY MASS INDEX: 25.72 KG/M2 | HEART RATE: 62 BPM | HEIGHT: 66 IN | WEIGHT: 160.05 LBS

## 2021-01-01 VITALS
HEIGHT: 66 IN | WEIGHT: 160 LBS | BODY MASS INDEX: 28.43 KG/M2 | WEIGHT: 173.5 LBS | BODY MASS INDEX: 28 KG/M2 | BODY MASS INDEX: 26.22 KG/M2

## 2021-01-01 DIAGNOSIS — K21.00 GASTROESOPHAGEAL REFLUX DISEASE WITH ESOPHAGITIS WITHOUT HEMORRHAGE: ICD-10-CM

## 2021-01-01 DIAGNOSIS — D62 ANEMIA DUE TO BLOOD LOSS, ACUTE: ICD-10-CM

## 2021-01-01 DIAGNOSIS — G93.41 ACUTE METABOLIC ENCEPHALOPATHY: ICD-10-CM

## 2021-01-01 DIAGNOSIS — L03.116 CELLULITIS OF LEFT KNEE: ICD-10-CM

## 2021-01-01 DIAGNOSIS — D64.9 ANEMIA, UNSPECIFIED: ICD-10-CM

## 2021-01-01 DIAGNOSIS — J44.9 CHRONIC OBSTRUCTIVE PULMONARY DISEASE, UNSPECIFIED COPD TYPE (H): ICD-10-CM

## 2021-01-01 DIAGNOSIS — R45.1 AGITATION: ICD-10-CM

## 2021-01-01 DIAGNOSIS — D62 ACUTE POSTHEMORRHAGIC ANEMIA: ICD-10-CM

## 2021-01-01 DIAGNOSIS — Z47.89 ENCOUNTER FOR OTHER ORTHOPEDIC AFTERCARE: ICD-10-CM

## 2021-01-01 DIAGNOSIS — I10 ESSENTIAL HYPERTENSION: Primary | ICD-10-CM

## 2021-01-01 DIAGNOSIS — Z96.652 STATUS POST TOTAL LEFT KNEE REPLACEMENT: Primary | ICD-10-CM

## 2021-01-01 DIAGNOSIS — M17.12 PRIMARY OSTEOARTHRITIS OF LEFT KNEE: Primary | ICD-10-CM

## 2021-01-01 DIAGNOSIS — N18.31 STAGE 3A CHRONIC KIDNEY DISEASE (H): ICD-10-CM

## 2021-01-01 DIAGNOSIS — I25.10 ATHEROSCLEROTIC HEART DISEASE OF NATIVE CORONARY ARTERY WITHOUT ANGINA PECTORIS: ICD-10-CM

## 2021-01-01 DIAGNOSIS — M17.12 PRIMARY OSTEOARTHRITIS OF LEFT KNEE: ICD-10-CM

## 2021-01-01 DIAGNOSIS — R30.0 DYSURIA: ICD-10-CM

## 2021-01-01 DIAGNOSIS — N18.31 STAGE 3A CHRONIC KIDNEY DISEASE (H): Primary | ICD-10-CM

## 2021-01-01 DIAGNOSIS — D64.9 CHRONIC ANEMIA: ICD-10-CM

## 2021-01-01 DIAGNOSIS — D64.9 NORMOCHROMIC NORMOCYTIC ANEMIA: ICD-10-CM

## 2021-01-01 DIAGNOSIS — M25.562 PAIN AND SWELLING OF LEFT KNEE: ICD-10-CM

## 2021-01-01 DIAGNOSIS — K29.01 GASTROINTESTINAL HEMORRHAGE ASSOCIATED WITH ACUTE GASTRITIS: ICD-10-CM

## 2021-01-01 DIAGNOSIS — I48.91 UNSPECIFIED ATRIAL FIBRILLATION (H): ICD-10-CM

## 2021-01-01 DIAGNOSIS — J44.9 CHRONIC OBSTRUCTIVE PULMONARY DISEASE, UNSPECIFIED COPD TYPE (H): Primary | ICD-10-CM

## 2021-01-01 DIAGNOSIS — N17.9 ACUTE KIDNEY INJURY (H): ICD-10-CM

## 2021-01-01 DIAGNOSIS — Z96.652 STATUS POST TOTAL LEFT KNEE REPLACEMENT: ICD-10-CM

## 2021-01-01 DIAGNOSIS — M25.462 PAIN AND SWELLING OF LEFT KNEE: ICD-10-CM

## 2021-01-01 DIAGNOSIS — U07.1 COVID-19: ICD-10-CM

## 2021-01-01 DIAGNOSIS — Z11.59 ENCOUNTER FOR SCREENING FOR OTHER VIRAL DISEASES: ICD-10-CM

## 2021-01-01 DIAGNOSIS — I10 ESSENTIAL (PRIMARY) HYPERTENSION: ICD-10-CM

## 2021-01-01 DIAGNOSIS — J18.9 PNEUMONIA OF LEFT LOWER LOBE DUE TO INFECTIOUS ORGANISM: ICD-10-CM

## 2021-01-01 DIAGNOSIS — J44.1 CHRONIC OBSTRUCTIVE PULMONARY DISEASE WITH (ACUTE) EXACERBATION (H): ICD-10-CM

## 2021-01-01 LAB
ABO/RH(D): NORMAL
ALBUMIN SERPL-MCNC: 2.3 G/DL (ref 3.5–5)
ALBUMIN UR-MCNC: 10 MG/DL
ALBUMIN UR-MCNC: NEGATIVE MG/DL
ALP SERPL-CCNC: 298 U/L (ref 45–120)
ALT SERPL W P-5'-P-CCNC: 19 U/L (ref 0–45)
ANION GAP SERPL CALCULATED.3IONS-SCNC: 10 MMOL/L (ref 5–18)
ANION GAP SERPL CALCULATED.3IONS-SCNC: 10 MMOL/L (ref 5–18)
ANION GAP SERPL CALCULATED.3IONS-SCNC: 11 MMOL/L (ref 5–18)
ANION GAP SERPL CALCULATED.3IONS-SCNC: 12 MMOL/L (ref 5–18)
ANION GAP SERPL CALCULATED.3IONS-SCNC: 12 MMOL/L (ref 5–18)
ANION GAP SERPL CALCULATED.3IONS-SCNC: 13 MMOL/L (ref 5–18)
ANTIBODY SCREEN: NEGATIVE
APPEARANCE UR: CLEAR
APPEARANCE UR: CLEAR
AST SERPL W P-5'-P-CCNC: 14 U/L (ref 0–40)
BACTERIA BLD CULT: NO GROWTH
BACTERIA BLD CULT: NO GROWTH
BASOPHILS # BLD AUTO: 0 10E3/UL (ref 0–0.2)
BASOPHILS # BLD AUTO: 0 10E3/UL (ref 0–0.2)
BASOPHILS # BLD AUTO: 0.1 10E3/UL (ref 0–0.2)
BASOPHILS NFR BLD AUTO: 0 %
BASOPHILS NFR BLD AUTO: 0 %
BASOPHILS NFR BLD AUTO: 1 %
BILIRUB SERPL-MCNC: 0.7 MG/DL (ref 0–1)
BILIRUB UR QL STRIP: NEGATIVE
BILIRUB UR QL STRIP: NEGATIVE
BLD PROD TYP BPU: NORMAL
BLD PROD TYP BPU: NORMAL
BLOOD COMPONENT TYPE: NORMAL
BLOOD COMPONENT TYPE: NORMAL
BNP SERPL-MCNC: 160 PG/ML (ref 0–93)
BUN SERPL-MCNC: 33 MG/DL (ref 8–28)
BUN SERPL-MCNC: 36 MG/DL (ref 8–28)
BUN SERPL-MCNC: 39 MG/DL (ref 8–28)
BUN SERPL-MCNC: 39 MG/DL (ref 8–28)
BUN SERPL-MCNC: 45 MG/DL (ref 8–28)
BUN SERPL-MCNC: 49 MG/DL (ref 8–28)
BUN SERPL-MCNC: 50 MG/DL (ref 8–28)
BUN SERPL-MCNC: 52 MG/DL (ref 8–28)
BUN SERPL-MCNC: 60 MG/DL (ref 8–28)
BUN SERPL-MCNC: 71 MG/DL (ref 8–28)
BUN SERPL-MCNC: 81 MG/DL (ref 8–28)
C REACTIVE PROTEIN LHE: 4.8 MG/DL (ref 0–0.8)
CALCIUM SERPL-MCNC: 8 MG/DL (ref 8.5–10.5)
CALCIUM SERPL-MCNC: 8.2 MG/DL (ref 8.5–10.5)
CALCIUM SERPL-MCNC: 8.4 MG/DL (ref 8.5–10.5)
CALCIUM SERPL-MCNC: 8.5 MG/DL (ref 8.5–10.5)
CALCIUM SERPL-MCNC: 8.6 MG/DL (ref 8.5–10.5)
CALCIUM SERPL-MCNC: 8.6 MG/DL (ref 8.5–10.5)
CALCIUM SERPL-MCNC: 8.7 MG/DL (ref 8.5–10.5)
CALCIUM SERPL-MCNC: 8.8 MG/DL (ref 8.5–10.5)
CALCIUM SERPL-MCNC: 8.8 MG/DL (ref 8.5–10.5)
CHLORIDE BLD-SCNC: 104 MMOL/L (ref 98–107)
CHLORIDE BLD-SCNC: 104 MMOL/L (ref 98–107)
CHLORIDE BLD-SCNC: 105 MMOL/L (ref 98–107)
CHLORIDE BLD-SCNC: 106 MMOL/L (ref 98–107)
CHLORIDE BLD-SCNC: 106 MMOL/L (ref 98–107)
CHLORIDE BLD-SCNC: 109 MMOL/L (ref 98–107)
CHLORIDE BLD-SCNC: 109 MMOL/L (ref 98–107)
CHLORIDE BLD-SCNC: 112 MMOL/L (ref 98–107)
CO2 SERPL-SCNC: 16 MMOL/L (ref 22–31)
CO2 SERPL-SCNC: 18 MMOL/L (ref 22–31)
CO2 SERPL-SCNC: 21 MMOL/L (ref 22–31)
CO2 SERPL-SCNC: 21 MMOL/L (ref 22–31)
CO2 SERPL-SCNC: 22 MMOL/L (ref 22–31)
CO2 SERPL-SCNC: 23 MMOL/L (ref 22–31)
CO2 SERPL-SCNC: 24 MMOL/L (ref 22–31)
CODING SYSTEM: NORMAL
CODING SYSTEM: NORMAL
COLOR UR AUTO: ABNORMAL
COLOR UR AUTO: COLORLESS
CREAT SERPL-MCNC: 1.48 MG/DL (ref 0.7–1.3)
CREAT SERPL-MCNC: 1.67 MG/DL (ref 0.7–1.3)
CREAT SERPL-MCNC: 1.7 MG/DL (ref 0.7–1.3)
CREAT SERPL-MCNC: 1.78 MG/DL (ref 0.7–1.3)
CREAT SERPL-MCNC: 1.82 MG/DL (ref 0.7–1.3)
CREAT SERPL-MCNC: 1.96 MG/DL (ref 0.7–1.3)
CREAT SERPL-MCNC: 2 MG/DL (ref 0.7–1.3)
CREAT SERPL-MCNC: 2.02 MG/DL (ref 0.7–1.3)
CREAT SERPL-MCNC: 2.03 MG/DL (ref 0.7–1.3)
CREAT SERPL-MCNC: 2.08 MG/DL (ref 0.7–1.3)
CREAT SERPL-MCNC: 2.53 MG/DL (ref 0.7–1.3)
CREAT SERPL-MCNC: 2.55 MG/DL (ref 0.7–1.3)
CROSSMATCH: NORMAL
CROSSMATCH: NORMAL
EOSINOPHIL # BLD AUTO: 0 10E3/UL (ref 0–0.7)
EOSINOPHIL # BLD AUTO: 0.4 10E3/UL (ref 0–0.7)
EOSINOPHIL # BLD AUTO: 0.7 10E3/UL (ref 0–0.7)
EOSINOPHIL NFR BLD AUTO: 0 %
EOSINOPHIL NFR BLD AUTO: 5 %
EOSINOPHIL NFR BLD AUTO: 7 %
ERYTHROCYTE [DISTWIDTH] IN BLOOD BY AUTOMATED COUNT: 13.8 % (ref 10–15)
ERYTHROCYTE [DISTWIDTH] IN BLOOD BY AUTOMATED COUNT: 14.2 % (ref 10–15)
ERYTHROCYTE [DISTWIDTH] IN BLOOD BY AUTOMATED COUNT: 14.5 % (ref 10–15)
ERYTHROCYTE [DISTWIDTH] IN BLOOD BY AUTOMATED COUNT: 16 % (ref 10–15)
ERYTHROCYTE [DISTWIDTH] IN BLOOD BY AUTOMATED COUNT: 17.4 % (ref 10–15)
GFR SERPL CREATININE-BSD FRML MDRD: 22 ML/MIN/1.73M2
GFR SERPL CREATININE-BSD FRML MDRD: 22 ML/MIN/1.73M2
GFR SERPL CREATININE-BSD FRML MDRD: 28 ML/MIN/1.73M2
GFR SERPL CREATININE-BSD FRML MDRD: 29 ML/MIN/1.73M2
GFR SERPL CREATININE-BSD FRML MDRD: 29 ML/MIN/1.73M2
GFR SERPL CREATININE-BSD FRML MDRD: 30 ML/MIN/1.73M2
GFR SERPL CREATININE-BSD FRML MDRD: 31 ML/MIN/1.73M2
GFR SERPL CREATININE-BSD FRML MDRD: 33 ML/MIN/1.73M2
GFR SERPL CREATININE-BSD FRML MDRD: 36 ML/MIN/1.73M2
GFR SERPL CREATININE-BSD FRML MDRD: 36 ML/MIN/1.73M2
GFR SERPL CREATININE-BSD FRML MDRD: 39 ML/MIN/1.73M2
GFR SERPL CREATININE-BSD FRML MDRD: 42 ML/MIN/1.73M2
GLUCOSE BLD-MCNC: 106 MG/DL (ref 70–125)
GLUCOSE BLD-MCNC: 113 MG/DL (ref 70–125)
GLUCOSE BLD-MCNC: 85 MG/DL (ref 70–125)
GLUCOSE BLD-MCNC: 87 MG/DL (ref 70–125)
GLUCOSE BLD-MCNC: 92 MG/DL (ref 70–125)
GLUCOSE BLD-MCNC: 92 MG/DL (ref 70–125)
GLUCOSE BLD-MCNC: 93 MG/DL (ref 70–125)
GLUCOSE BLD-MCNC: 94 MG/DL (ref 70–125)
GLUCOSE BLD-MCNC: 94 MG/DL (ref 70–125)
GLUCOSE BLD-MCNC: 97 MG/DL (ref 70–125)
GLUCOSE BLD-MCNC: 99 MG/DL (ref 70–125)
GLUCOSE UR STRIP-MCNC: NEGATIVE MG/DL
GLUCOSE UR STRIP-MCNC: NEGATIVE MG/DL
HCT VFR BLD AUTO: 20.4 % (ref 40–53)
HCT VFR BLD AUTO: 20.8 % (ref 40–53)
HCT VFR BLD AUTO: 22.1 % (ref 40–53)
HCT VFR BLD AUTO: 22.3 % (ref 40–53)
HCT VFR BLD AUTO: 22.4 % (ref 40–53)
HCT VFR BLD AUTO: 24.7 % (ref 40–53)
HCT VFR BLD AUTO: 24.9 % (ref 40–53)
HEMOCCULT STL QL: POSITIVE
HGB BLD-MCNC: 6.5 G/DL (ref 13.3–17.7)
HGB BLD-MCNC: 6.7 G/DL (ref 13.3–17.7)
HGB BLD-MCNC: 7 G/DL (ref 13.3–17.7)
HGB BLD-MCNC: 7.1 G/DL (ref 13.3–17.7)
HGB BLD-MCNC: 7.1 G/DL (ref 13.3–17.7)
HGB BLD-MCNC: 7.2 G/DL (ref 13.3–17.7)
HGB BLD-MCNC: 7.9 G/DL (ref 13.3–17.7)
HGB BLD-MCNC: 8.1 G/DL (ref 13.3–17.7)
HGB BLD-MCNC: 8.4 G/DL (ref 13.3–17.7)
HGB BLD-MCNC: 8.6 G/DL (ref 13.3–17.7)
HGB BLD-MCNC: 8.9 G/DL (ref 13.3–17.7)
HGB UR QL STRIP: NEGATIVE
HGB UR QL STRIP: NEGATIVE
HOLD SPECIMEN: NORMAL
IMM GRANULOCYTES # BLD: 0 10E3/UL
IMM GRANULOCYTES # BLD: 0.1 10E3/UL
IMM GRANULOCYTES # BLD: 0.1 10E3/UL
IMM GRANULOCYTES NFR BLD: 0 %
IMM GRANULOCYTES NFR BLD: 1 %
IMM GRANULOCYTES NFR BLD: 1 %
IRON SATN MFR SERPL: 15 % (ref 15–46)
IRON SERPL-MCNC: 32 UG/DL (ref 35–180)
ISSUE DATE AND TIME: NORMAL
ISSUE DATE AND TIME: NORMAL
KETONES UR STRIP-MCNC: NEGATIVE MG/DL
KETONES UR STRIP-MCNC: NEGATIVE MG/DL
LEUKOCYTE ESTERASE UR QL STRIP: NEGATIVE
LEUKOCYTE ESTERASE UR QL STRIP: NEGATIVE
LYMPHOCYTES # BLD AUTO: 1.2 10E3/UL (ref 0.8–5.3)
LYMPHOCYTES # BLD AUTO: 1.3 10E3/UL (ref 0.8–5.3)
LYMPHOCYTES # BLD AUTO: 1.4 10E3/UL (ref 0.8–5.3)
LYMPHOCYTES NFR BLD AUTO: 14 %
LYMPHOCYTES NFR BLD AUTO: 15 %
LYMPHOCYTES NFR BLD AUTO: 9 %
MAGNESIUM SERPL-MCNC: 2.1 MG/DL (ref 1.8–2.6)
MAGNESIUM SERPL-MCNC: 2.4 MG/DL (ref 1.8–2.6)
MCH RBC QN AUTO: 29.9 PG (ref 26.5–33)
MCH RBC QN AUTO: 30 PG (ref 26.5–33)
MCH RBC QN AUTO: 30 PG (ref 26.5–33)
MCH RBC QN AUTO: 30.2 PG (ref 26.5–33)
MCH RBC QN AUTO: 30.3 PG (ref 26.5–33)
MCH RBC QN AUTO: 30.9 PG (ref 26.5–33)
MCH RBC QN AUTO: 31.9 PG (ref 26.5–33)
MCHC RBC AUTO-ENTMCNC: 31.3 G/DL (ref 31.5–36.5)
MCHC RBC AUTO-ENTMCNC: 31.8 G/DL (ref 31.5–36.5)
MCHC RBC AUTO-ENTMCNC: 31.9 G/DL (ref 31.5–36.5)
MCHC RBC AUTO-ENTMCNC: 32 G/DL (ref 31.5–36.5)
MCHC RBC AUTO-ENTMCNC: 32.1 G/DL (ref 31.5–36.5)
MCHC RBC AUTO-ENTMCNC: 32.2 G/DL (ref 31.5–36.5)
MCHC RBC AUTO-ENTMCNC: 32.5 G/DL (ref 31.5–36.5)
MCV RBC AUTO: 100 FL (ref 78–100)
MCV RBC AUTO: 93 FL (ref 78–100)
MCV RBC AUTO: 94 FL (ref 78–100)
MCV RBC AUTO: 96 FL (ref 78–100)
MCV RBC AUTO: 96 FL (ref 78–100)
MONOCYTES # BLD AUTO: 0.8 10E3/UL (ref 0–1.3)
MONOCYTES # BLD AUTO: 0.8 10E3/UL (ref 0–1.3)
MONOCYTES # BLD AUTO: 1.5 10E3/UL (ref 0–1.3)
MONOCYTES NFR BLD AUTO: 11 %
MONOCYTES NFR BLD AUTO: 8 %
MONOCYTES NFR BLD AUTO: 8 %
MUCOUS THREADS #/AREA URNS LPF: PRESENT /LPF
NEUTROPHILS # BLD AUTO: 10 10E3/UL (ref 1.6–8.3)
NEUTROPHILS # BLD AUTO: 6.6 10E3/UL (ref 1.6–8.3)
NEUTROPHILS # BLD AUTO: 6.7 10E3/UL (ref 1.6–8.3)
NEUTROPHILS NFR BLD AUTO: 70 %
NEUTROPHILS NFR BLD AUTO: 71 %
NEUTROPHILS NFR BLD AUTO: 79 %
NITRATE UR QL: NEGATIVE
NITRATE UR QL: NEGATIVE
NRBC # BLD AUTO: 0 10E3/UL
NRBC BLD AUTO-RTO: 0 /100
PH UR STRIP: 5 [PH] (ref 5–7)
PH UR STRIP: 5.5 [PH] (ref 5–7)
PLATELET # BLD AUTO: 287 10E3/UL (ref 150–450)
PLATELET # BLD AUTO: 379 10E3/UL (ref 150–450)
PLATELET # BLD AUTO: 388 10E3/UL (ref 150–450)
PLATELET # BLD AUTO: 819 10E3/UL (ref 150–450)
PLATELET # BLD AUTO: 859 10E3/UL (ref 150–450)
PLATELET # BLD AUTO: 876 10E3/UL (ref 150–450)
PLATELET # BLD AUTO: 884 10E3/UL (ref 150–450)
POTASSIUM BLD-SCNC: 3.8 MMOL/L (ref 3.5–5)
POTASSIUM BLD-SCNC: 3.9 MMOL/L (ref 3.5–5)
POTASSIUM BLD-SCNC: 4.1 MMOL/L (ref 3.5–5)
POTASSIUM BLD-SCNC: 4.2 MMOL/L (ref 3.5–5)
POTASSIUM BLD-SCNC: 4.3 MMOL/L (ref 3.5–5)
POTASSIUM BLD-SCNC: 4.4 MMOL/L (ref 3.5–5)
POTASSIUM BLD-SCNC: 4.6 MMOL/L (ref 3.5–5)
PROT SERPL-MCNC: 5.4 G/DL (ref 6–8)
RBC # BLD AUTO: 2.17 10E6/UL (ref 4.4–5.9)
RBC # BLD AUTO: 2.21 10E6/UL (ref 4.4–5.9)
RBC # BLD AUTO: 2.3 10E6/UL (ref 4.4–5.9)
RBC # BLD AUTO: 2.34 10E6/UL (ref 4.4–5.9)
RBC # BLD AUTO: 2.37 10E6/UL (ref 4.4–5.9)
RBC # BLD AUTO: 2.48 10E6/UL (ref 4.4–5.9)
RBC # BLD AUTO: 2.68 10E6/UL (ref 4.4–5.9)
RBC URINE: <1 /HPF
RETICS # AUTO: 0.07 10E6/UL (ref 0.01–0.11)
RETICS/RBC NFR AUTO: 2.3 % (ref 0.8–2.7)
SARS-COV-2 PCR COMMENT: NORMAL
SARS-COV-2 RNA RESP QL NAA+PROBE: NEGATIVE
SARS-COV-2 RNA RESP QL NAA+PROBE: NEGATIVE
SARS-COV-2 RNA SPEC QL NAA+PROBE: NEGATIVE
SARS-COV-2 VIRUS SPECIMEN SOURCE: NORMAL
SODIUM SERPL-SCNC: 138 MMOL/L (ref 136–145)
SODIUM SERPL-SCNC: 139 MMOL/L (ref 136–145)
SODIUM SERPL-SCNC: 140 MMOL/L (ref 136–145)
SODIUM SERPL-SCNC: 141 MMOL/L (ref 136–145)
SP GR UR STRIP: 1.01 (ref 1–1.03)
SP GR UR STRIP: 1.02 (ref 1–1.03)
SPECIMEN EXPIRATION DATE: NORMAL
SQUAMOUS EPITHELIAL: 1 /HPF
TIBC SERPL-MCNC: 215 UG/DL (ref 240–430)
UNIT ABO/RH: NORMAL
UNIT ABO/RH: NORMAL
UNIT NUMBER: NORMAL
UNIT NUMBER: NORMAL
UNIT STATUS: NORMAL
UNIT STATUS: NORMAL
UNIT TYPE ISBT: 6200
UNIT TYPE ISBT: 6200
UROBILINOGEN UR STRIP-MCNC: <2 MG/DL
UROBILINOGEN UR STRIP-MCNC: <2 MG/DL
WBC # BLD AUTO: 12.8 10E3/UL (ref 4–11)
WBC # BLD AUTO: 7.2 10E3/UL (ref 4–11)
WBC # BLD AUTO: 7.9 10E3/UL (ref 4–11)
WBC # BLD AUTO: 8.7 10E3/UL (ref 4–11)
WBC # BLD AUTO: 9 10E3/UL (ref 4–11)
WBC # BLD AUTO: 9.2 10E3/UL (ref 4–11)
WBC # BLD AUTO: 9.6 10E3/UL (ref 4–11)
WBC URINE: 1 /HPF

## 2021-01-01 PROCEDURE — 97535 SELF CARE MNGMENT TRAINING: CPT | Mod: GO

## 2021-01-01 PROCEDURE — 36415 COLL VENOUS BLD VENIPUNCTURE: CPT | Performed by: FAMILY MEDICINE

## 2021-01-01 PROCEDURE — 85027 COMPLETE CBC AUTOMATED: CPT

## 2021-01-01 PROCEDURE — 80048 BASIC METABOLIC PNL TOTAL CA: CPT

## 2021-01-01 PROCEDURE — 85018 HEMOGLOBIN: CPT | Performed by: INTERNAL MEDICINE

## 2021-01-01 PROCEDURE — 99305 1ST NF CARE MODERATE MDM 35: CPT | Performed by: FAMILY MEDICINE

## 2021-01-01 PROCEDURE — 97530 THERAPEUTIC ACTIVITIES: CPT | Mod: GP

## 2021-01-01 PROCEDURE — 36415 COLL VENOUS BLD VENIPUNCTURE: CPT | Performed by: EMERGENCY MEDICINE

## 2021-01-01 PROCEDURE — 250N000009 HC RX 250: Performed by: FAMILY MEDICINE

## 2021-01-01 PROCEDURE — 250N000011 HC RX IP 250 OP 636: Performed by: FAMILY MEDICINE

## 2021-01-01 PROCEDURE — 250N000011 HC RX IP 250 OP 636

## 2021-01-01 PROCEDURE — 258N000003 HC RX IP 258 OP 636: Performed by: FAMILY MEDICINE

## 2021-01-01 PROCEDURE — 82272 OCCULT BLD FECES 1-3 TESTS: CPT | Performed by: INTERNAL MEDICINE

## 2021-01-01 PROCEDURE — 120N000001 HC R&B MED SURG/OB

## 2021-01-01 PROCEDURE — U0005 INFEC AGEN DETEC AMPLI PROBE: HCPCS | Performed by: FAMILY MEDICINE

## 2021-01-01 PROCEDURE — 99223 1ST HOSP IP/OBS HIGH 75: CPT | Performed by: FAMILY MEDICINE

## 2021-01-01 PROCEDURE — 97110 THERAPEUTIC EXERCISES: CPT | Mod: GP

## 2021-01-01 PROCEDURE — 71045 X-RAY EXAM CHEST 1 VIEW: CPT

## 2021-01-01 PROCEDURE — 250N000013 HC RX MED GY IP 250 OP 250 PS 637: Performed by: INTERNAL MEDICINE

## 2021-01-01 PROCEDURE — 250N000013 HC RX MED GY IP 250 OP 250 PS 637: Performed by: FAMILY MEDICINE

## 2021-01-01 PROCEDURE — 85025 COMPLETE CBC W/AUTO DIFF WBC: CPT | Mod: ORL | Performed by: NURSE PRACTITIONER

## 2021-01-01 PROCEDURE — 99233 SBSQ HOSP IP/OBS HIGH 50: CPT | Mod: GC | Performed by: FAMILY MEDICINE

## 2021-01-01 PROCEDURE — 99309 SBSQ NF CARE MODERATE MDM 30: CPT | Performed by: NURSE PRACTITIONER

## 2021-01-01 PROCEDURE — 85027 COMPLETE CBC AUTOMATED: CPT | Performed by: FAMILY MEDICINE

## 2021-01-01 PROCEDURE — 87040 BLOOD CULTURE FOR BACTERIA: CPT | Performed by: EMERGENCY MEDICINE

## 2021-01-01 PROCEDURE — P9603 ONE-WAY ALLOW PRORATED MILES: HCPCS | Performed by: FAMILY MEDICINE

## 2021-01-01 PROCEDURE — 97116 GAIT TRAINING THERAPY: CPT | Mod: GP

## 2021-01-01 PROCEDURE — 83735 ASSAY OF MAGNESIUM: CPT | Performed by: FAMILY MEDICINE

## 2021-01-01 PROCEDURE — 86141 C-REACTIVE PROTEIN HS: CPT | Performed by: EMERGENCY MEDICINE

## 2021-01-01 PROCEDURE — 36415 COLL VENOUS BLD VENIPUNCTURE: CPT | Performed by: INTERNAL MEDICINE

## 2021-01-01 PROCEDURE — P9604 ONE-WAY ALLOW PRORATED TRIP: HCPCS | Performed by: FAMILY MEDICINE

## 2021-01-01 PROCEDURE — 96365 THER/PROPH/DIAG IV INF INIT: CPT

## 2021-01-01 PROCEDURE — 99309 SBSQ NF CARE MODERATE MDM 30: CPT | Performed by: FAMILY MEDICINE

## 2021-01-01 PROCEDURE — 99232 SBSQ HOSP IP/OBS MODERATE 35: CPT | Mod: GC | Performed by: INTERNAL MEDICINE

## 2021-01-01 PROCEDURE — 86923 COMPATIBILITY TEST ELECTRIC: CPT | Performed by: FAMILY MEDICINE

## 2021-01-01 PROCEDURE — C9113 INJ PANTOPRAZOLE SODIUM, VIA: HCPCS

## 2021-01-01 PROCEDURE — 36415 COLL VENOUS BLD VENIPUNCTURE: CPT | Performed by: ORTHOPAEDIC SURGERY

## 2021-01-01 PROCEDURE — 99306 1ST NF CARE HIGH MDM 50: CPT | Performed by: FAMILY MEDICINE

## 2021-01-01 PROCEDURE — 36415 COLL VENOUS BLD VENIPUNCTURE: CPT

## 2021-01-01 PROCEDURE — P9603 ONE-WAY ALLOW PRORATED MILES: HCPCS | Performed by: INTERNAL MEDICINE

## 2021-01-01 PROCEDURE — 82272 OCCULT BLD FECES 1-3 TESTS: CPT

## 2021-01-01 PROCEDURE — 97166 OT EVAL MOD COMPLEX 45 MIN: CPT | Mod: GO

## 2021-01-01 PROCEDURE — C9113 INJ PANTOPRAZOLE SODIUM, VIA: HCPCS | Performed by: INTERNAL MEDICINE

## 2021-01-01 PROCEDURE — 250N000011 HC RX IP 250 OP 636: Performed by: INTERNAL MEDICINE

## 2021-01-01 PROCEDURE — 83880 ASSAY OF NATRIURETIC PEPTIDE: CPT | Performed by: FAMILY MEDICINE

## 2021-01-01 PROCEDURE — 85045 AUTOMATED RETICULOCYTE COUNT: CPT | Performed by: INTERNAL MEDICINE

## 2021-01-01 PROCEDURE — 999N001212 HC REV CODE 9999 CHARGE CONVERSION OPNP: Mod: GP

## 2021-01-01 PROCEDURE — 99239 HOSP IP/OBS DSCHRG MGMT >30: CPT | Performed by: FAMILY MEDICINE

## 2021-01-01 PROCEDURE — 99316 NF DSCHRG MGMT 30 MIN+: CPT | Performed by: FAMILY MEDICINE

## 2021-01-01 PROCEDURE — 80048 BASIC METABOLIC PNL TOTAL CA: CPT | Performed by: FAMILY MEDICINE

## 2021-01-01 PROCEDURE — 81001 URINALYSIS AUTO W/SCOPE: CPT | Performed by: INTERNAL MEDICINE

## 2021-01-01 PROCEDURE — 97163 PT EVAL HIGH COMPLEX 45 MIN: CPT | Mod: GP

## 2021-01-01 PROCEDURE — 999N001212 HC REV CODE 9999 CHARGE CONVERSION OPNP

## 2021-01-01 PROCEDURE — 97530 THERAPEUTIC ACTIVITIES: CPT | Mod: GP | Performed by: PHYSICAL THERAPIST

## 2021-01-01 PROCEDURE — 250N000013 HC RX MED GY IP 250 OP 250 PS 637

## 2021-01-01 PROCEDURE — 83735 ASSAY OF MAGNESIUM: CPT | Performed by: ORTHOPAEDIC SURGERY

## 2021-01-01 PROCEDURE — C9803 HOPD COVID-19 SPEC COLLECT: HCPCS

## 2021-01-01 PROCEDURE — 85025 COMPLETE CBC W/AUTO DIFF WBC: CPT | Performed by: EMERGENCY MEDICINE

## 2021-01-01 PROCEDURE — 83735 ASSAY OF MAGNESIUM: CPT

## 2021-01-01 PROCEDURE — 99233 SBSQ HOSP IP/OBS HIGH 50: CPT | Performed by: FAMILY MEDICINE

## 2021-01-01 PROCEDURE — 85025 COMPLETE CBC W/AUTO DIFF WBC: CPT | Performed by: NURSE PRACTITIONER

## 2021-01-01 PROCEDURE — 85018 HEMOGLOBIN: CPT

## 2021-01-01 PROCEDURE — 99285 EMERGENCY DEPT VISIT HI MDM: CPT | Mod: 25

## 2021-01-01 PROCEDURE — 0SRD0J9 REPLACEMENT OF LEFT KNEE JOINT WITH SYNTHETIC SUBSTITUTE, CEMENTED, OPEN APPROACH: ICD-10-PCS | Performed by: ORTHOPAEDIC SURGERY

## 2021-01-01 PROCEDURE — 83550 IRON BINDING TEST: CPT | Performed by: FAMILY MEDICINE

## 2021-01-01 PROCEDURE — 80048 BASIC METABOLIC PNL TOTAL CA: CPT | Performed by: INTERNAL MEDICINE

## 2021-01-01 PROCEDURE — P9604 ONE-WAY ALLOW PRORATED TRIP: HCPCS | Mod: ORL | Performed by: NURSE PRACTITIONER

## 2021-01-01 PROCEDURE — 73562 X-RAY EXAM OF KNEE 3: CPT | Mod: LT

## 2021-01-01 PROCEDURE — 97535 SELF CARE MNGMENT TRAINING: CPT | Mod: GO | Performed by: OCCUPATIONAL THERAPIST

## 2021-01-01 PROCEDURE — 36415 COLL VENOUS BLD VENIPUNCTURE: CPT | Mod: ORL | Performed by: NURSE PRACTITIONER

## 2021-01-01 PROCEDURE — 93971 EXTREMITY STUDY: CPT | Mod: LT

## 2021-01-01 PROCEDURE — 82565 ASSAY OF CREATININE: CPT | Performed by: FAMILY MEDICINE

## 2021-01-01 PROCEDURE — P9016 RBC LEUKOCYTES REDUCED: HCPCS | Performed by: FAMILY MEDICINE

## 2021-01-01 PROCEDURE — 86900 BLOOD TYPING SEROLOGIC ABO: CPT | Performed by: EMERGENCY MEDICINE

## 2021-01-01 PROCEDURE — 80048 BASIC METABOLIC PNL TOTAL CA: CPT | Performed by: EMERGENCY MEDICINE

## 2021-01-01 PROCEDURE — 87635 SARS-COV-2 COVID-19 AMP PRB: CPT | Performed by: EMERGENCY MEDICINE

## 2021-01-01 PROCEDURE — 250N000011 HC RX IP 250 OP 636: Performed by: EMERGENCY MEDICINE

## 2021-01-01 PROCEDURE — 81003 URINALYSIS AUTO W/O SCOPE: CPT | Performed by: INTERNAL MEDICINE

## 2021-01-01 PROCEDURE — 80048 BASIC METABOLIC PNL TOTAL CA: CPT | Mod: ORL | Performed by: NURSE PRACTITIONER

## 2021-01-01 PROCEDURE — 80053 COMPREHEN METABOLIC PANEL: CPT | Mod: ORL | Performed by: NURSE PRACTITIONER

## 2021-01-01 PROCEDURE — 99207 PR NO CHARGE LOS: CPT | Performed by: INTERNAL MEDICINE

## 2021-01-01 PROCEDURE — 73560 X-RAY EXAM OF KNEE 1 OR 2: CPT

## 2021-01-01 PROCEDURE — 94761 N-INVAS EAR/PLS OXIMETRY MLT: CPT

## 2021-01-01 PROCEDURE — 97162 PT EVAL MOD COMPLEX 30 MIN: CPT | Mod: GP

## 2021-01-01 PROCEDURE — 97116 GAIT TRAINING THERAPY: CPT | Mod: GP | Performed by: PHYSICAL THERAPIST

## 2021-01-01 PROCEDURE — 85027 COMPLETE CBC AUTOMATED: CPT | Performed by: ORTHOPAEDIC SURGERY

## 2021-01-01 PROCEDURE — 85018 HEMOGLOBIN: CPT | Performed by: FAMILY MEDICINE

## 2021-01-01 RX ORDER — NALOXONE HYDROCHLORIDE 0.4 MG/ML
0.4 INJECTION, SOLUTION INTRAMUSCULAR; INTRAVENOUS; SUBCUTANEOUS
Status: DISCONTINUED | OUTPATIENT
Start: 2021-01-01 | End: 2021-01-01 | Stop reason: HOSPADM

## 2021-01-01 RX ORDER — LANOLIN ALCOHOL/MO/W.PET/CERES
1000 CREAM (GRAM) TOPICAL EVERY EVENING
Status: DISCONTINUED | OUTPATIENT
Start: 2021-01-01 | End: 2021-01-01 | Stop reason: HOSPADM

## 2021-01-01 RX ORDER — AMOXICILLIN 250 MG
1 CAPSULE ORAL 2 TIMES DAILY
Status: DISCONTINUED | OUTPATIENT
Start: 2021-01-01 | End: 2021-01-01 | Stop reason: HOSPADM

## 2021-01-01 RX ORDER — PANTOPRAZOLE SODIUM 20 MG/1
40 TABLET, DELAYED RELEASE ORAL
Status: DISCONTINUED | OUTPATIENT
Start: 2021-01-01 | End: 2021-01-01

## 2021-01-01 RX ORDER — HYDROXYZINE HYDROCHLORIDE 25 MG/1
25 TABLET, FILM COATED ORAL 3 TIMES DAILY
COMMUNITY
End: 2021-01-01

## 2021-01-01 RX ORDER — FERROUS SULFATE 325(65) MG
325 TABLET, DELAYED RELEASE (ENTERIC COATED) ORAL 2 TIMES DAILY
COMMUNITY

## 2021-01-01 RX ORDER — HYDROXYZINE HYDROCHLORIDE 25 MG/1
25 TABLET, FILM COATED ORAL 4 TIMES DAILY
Status: DISCONTINUED | OUTPATIENT
Start: 2021-01-01 | End: 2021-01-01 | Stop reason: HOSPADM

## 2021-01-01 RX ORDER — LACTOBACILLUS RHAMNOSUS GG 10B CELL
1 CAPSULE ORAL 2 TIMES DAILY
Qty: 14 CAPSULE | Refills: 0 | Status: SHIPPED | OUTPATIENT
Start: 2021-01-01 | End: 2021-01-01

## 2021-01-01 RX ORDER — PIPERACILLIN SODIUM, TAZOBACTAM SODIUM 3; .375 G/15ML; G/15ML
3.38 INJECTION, POWDER, LYOPHILIZED, FOR SOLUTION INTRAVENOUS EVERY 8 HOURS
Status: DISCONTINUED | OUTPATIENT
Start: 2021-01-01 | End: 2021-01-01 | Stop reason: HOSPADM

## 2021-01-01 RX ORDER — PIPERACILLIN SODIUM, TAZOBACTAM SODIUM 3; .375 G/15ML; G/15ML
3.38 INJECTION, POWDER, LYOPHILIZED, FOR SOLUTION INTRAVENOUS ONCE
Status: COMPLETED | OUTPATIENT
Start: 2021-01-01 | End: 2021-01-01

## 2021-01-01 RX ORDER — QUETIAPINE FUMARATE 25 MG/1
25 TABLET, FILM COATED ORAL 2 TIMES DAILY PRN
Qty: 20 TABLET | Refills: 0 | Status: SHIPPED | OUTPATIENT
Start: 2021-01-01

## 2021-01-01 RX ORDER — L. ACIDOPHILUS/PECTIN, CITRUS 25MM-100MG
1 TABLET ORAL
Status: DISCONTINUED | OUTPATIENT
Start: 2021-01-01 | End: 2021-01-01 | Stop reason: RX

## 2021-01-01 RX ORDER — METOPROLOL SUCCINATE 25 MG/1
25 TABLET, EXTENDED RELEASE ORAL DAILY
COMMUNITY

## 2021-01-01 RX ORDER — ONDANSETRON 4 MG/1
4 TABLET, ORALLY DISINTEGRATING ORAL EVERY 6 HOURS PRN
Status: DISCONTINUED | OUTPATIENT
Start: 2021-01-01 | End: 2021-01-01 | Stop reason: HOSPADM

## 2021-01-01 RX ORDER — NALOXONE HYDROCHLORIDE 0.4 MG/ML
0.2 INJECTION, SOLUTION INTRAMUSCULAR; INTRAVENOUS; SUBCUTANEOUS
Status: DISCONTINUED | OUTPATIENT
Start: 2021-01-01 | End: 2021-01-01 | Stop reason: HOSPADM

## 2021-01-01 RX ORDER — POTASSIUM CHLORIDE 750 MG/1
10 TABLET, EXTENDED RELEASE ORAL 2 TIMES DAILY
Status: DISCONTINUED | OUTPATIENT
Start: 2021-01-01 | End: 2021-01-01

## 2021-01-01 RX ORDER — ESCITALOPRAM OXALATE 20 MG/1
20 TABLET ORAL DAILY
Status: DISCONTINUED | OUTPATIENT
Start: 2021-01-01 | End: 2021-01-01 | Stop reason: HOSPADM

## 2021-01-01 RX ORDER — LIDOCAINE 40 MG/G
CREAM TOPICAL
Status: DISCONTINUED | OUTPATIENT
Start: 2021-01-01 | End: 2021-01-01 | Stop reason: HOSPADM

## 2021-01-01 RX ORDER — HYDROMORPHONE HCL IN WATER/PF 6 MG/30 ML
0.2 PATIENT CONTROLLED ANALGESIA SYRINGE INTRAVENOUS
Status: DISCONTINUED | OUTPATIENT
Start: 2021-01-01 | End: 2021-01-01 | Stop reason: HOSPADM

## 2021-01-01 RX ORDER — POTASSIUM CHLORIDE 750 MG/1
10 TABLET, EXTENDED RELEASE ORAL 2 TIMES DAILY
Status: DISCONTINUED | OUTPATIENT
Start: 2021-01-01 | End: 2021-01-01 | Stop reason: HOSPADM

## 2021-01-01 RX ORDER — TRAZODONE HYDROCHLORIDE 50 MG/1
50 TABLET, FILM COATED ORAL
COMMUNITY

## 2021-01-01 RX ORDER — PANTOPRAZOLE SODIUM 20 MG/1
40 TABLET, DELAYED RELEASE ORAL
Status: DISCONTINUED | OUTPATIENT
Start: 2021-01-01 | End: 2021-01-01 | Stop reason: HOSPADM

## 2021-01-01 RX ORDER — ALBUTEROL SULFATE 90 UG/1
2 AEROSOL, METERED RESPIRATORY (INHALATION) EVERY 6 HOURS PRN
COMMUNITY

## 2021-01-01 RX ORDER — TORSEMIDE 20 MG/1
20 TABLET ORAL 2 TIMES DAILY
COMMUNITY
End: 2021-01-01

## 2021-01-01 RX ORDER — ASPIRIN 81 MG/1
81 TABLET ORAL 2 TIMES DAILY
Status: DISCONTINUED | OUTPATIENT
Start: 2021-01-01 | End: 2021-01-01 | Stop reason: HOSPADM

## 2021-01-01 RX ORDER — OXYCODONE HYDROCHLORIDE 5 MG/1
5 TABLET ORAL EVERY 4 HOURS PRN
Qty: 10 TABLET | Refills: 0 | Status: SHIPPED | OUTPATIENT
Start: 2021-01-01 | End: 2021-01-01

## 2021-01-01 RX ORDER — QUETIAPINE FUMARATE 25 MG/1
25 TABLET, FILM COATED ORAL EVERY 6 HOURS PRN
COMMUNITY

## 2021-01-01 RX ORDER — QUETIAPINE FUMARATE 25 MG/1
25 TABLET, FILM COATED ORAL 2 TIMES DAILY PRN
COMMUNITY
Start: 2021-01-01 | End: 2021-01-01

## 2021-01-01 RX ORDER — SENNOSIDES 8.6 MG
650 CAPSULE ORAL EVERY 8 HOURS PRN
COMMUNITY

## 2021-01-01 RX ORDER — POTASSIUM CHLORIDE 750 MG/1
10 TABLET, EXTENDED RELEASE ORAL 2 TIMES DAILY
COMMUNITY
End: 2021-01-01

## 2021-01-01 RX ORDER — CYANOCOBALAMIN (VITAMIN B-12) 1000 MCG
1000 TABLET ORAL EVERY EVENING
Status: DISCONTINUED | OUTPATIENT
Start: 2021-01-01 | End: 2021-01-01 | Stop reason: CLARIF

## 2021-01-01 RX ORDER — OXYCODONE HYDROCHLORIDE 5 MG/1
5-10 TABLET ORAL EVERY 6 HOURS PRN
Status: DISCONTINUED | OUTPATIENT
Start: 2021-01-01 | End: 2021-01-01 | Stop reason: HOSPADM

## 2021-01-01 RX ORDER — ASPIRIN 81 MG/1
81 TABLET ORAL 2 TIMES DAILY WITH MEALS
COMMUNITY

## 2021-01-01 RX ORDER — TAMSULOSIN HYDROCHLORIDE 0.4 MG/1
0.4 CAPSULE ORAL DAILY
Status: DISCONTINUED | OUTPATIENT
Start: 2021-01-01 | End: 2021-01-01 | Stop reason: HOSPADM

## 2021-01-01 RX ORDER — NYSTATIN 100000 U/G
CREAM TOPICAL 2 TIMES DAILY
Status: DISCONTINUED | OUTPATIENT
Start: 2021-01-01 | End: 2021-01-01 | Stop reason: HOSPADM

## 2021-01-01 RX ORDER — LIDOCAINE HYDROCHLORIDE 10 MG/ML
.1-1 INJECTION, SOLUTION EPIDURAL; INFILTRATION; INTRACAUDAL; PERINEURAL
Status: DISCONTINUED | OUTPATIENT
Start: 2021-01-01 | End: 2021-01-01 | Stop reason: HOSPADM

## 2021-01-01 RX ORDER — QUETIAPINE FUMARATE 25 MG/1
25 TABLET, FILM COATED ORAL 2 TIMES DAILY
Status: DISCONTINUED | OUTPATIENT
Start: 2021-01-01 | End: 2021-01-01 | Stop reason: HOSPADM

## 2021-01-01 RX ORDER — OXYCODONE HYDROCHLORIDE 5 MG/1
5 TABLET ORAL EVERY 4 HOURS PRN
Status: ON HOLD | COMMUNITY
End: 2021-01-01

## 2021-01-01 RX ORDER — ASPIRIN 81 MG/1
81 TABLET, CHEWABLE ORAL 2 TIMES DAILY
Status: DISCONTINUED | OUTPATIENT
Start: 2021-01-01 | End: 2021-01-01 | Stop reason: HOSPADM

## 2021-01-01 RX ORDER — CEFAZOLIN SODIUM 1 G/50ML
1250 SOLUTION INTRAVENOUS ONCE
Status: DISCONTINUED | OUTPATIENT
Start: 2021-01-01 | End: 2021-01-01

## 2021-01-01 RX ORDER — POLYETHYLENE GLYCOL 3350 17 G/17G
17 POWDER, FOR SOLUTION ORAL DAILY
Status: DISCONTINUED | OUTPATIENT
Start: 2021-01-01 | End: 2021-01-01 | Stop reason: HOSPADM

## 2021-01-01 RX ORDER — OXYCODONE HYDROCHLORIDE 5 MG/1
5 TABLET ORAL EVERY 4 HOURS PRN
Status: DISCONTINUED | OUTPATIENT
Start: 2021-01-01 | End: 2021-01-01 | Stop reason: HOSPADM

## 2021-01-01 RX ORDER — LACTOBACILLUS RHAMNOSUS GG 10B CELL
1 CAPSULE ORAL
Status: DISCONTINUED | OUTPATIENT
Start: 2021-01-01 | End: 2021-01-01 | Stop reason: HOSPADM

## 2021-01-01 RX ORDER — NYSTATIN 100000 U/G
CREAM TOPICAL 2 TIMES DAILY
Qty: 30 G | Refills: 0 | Status: SHIPPED | OUTPATIENT
Start: 2021-01-01

## 2021-01-01 RX ORDER — ACETAMINOPHEN 325 MG/1
650 TABLET ORAL EVERY 4 HOURS PRN
Status: DISCONTINUED | OUTPATIENT
Start: 2021-01-01 | End: 2021-01-01 | Stop reason: HOSPADM

## 2021-01-01 RX ORDER — PANTOPRAZOLE SODIUM 40 MG/1
40 TABLET, DELAYED RELEASE ORAL
Qty: 30 TABLET | Refills: 0 | Status: SHIPPED | OUTPATIENT
Start: 2021-01-01

## 2021-01-01 RX ORDER — FERROUS SULFATE 325(65) MG
325 TABLET ORAL 2 TIMES DAILY
Status: DISCONTINUED | OUTPATIENT
Start: 2021-01-01 | End: 2021-01-01 | Stop reason: HOSPADM

## 2021-01-01 RX ORDER — HYDROCHLOROTHIAZIDE 25 MG/1
25 TABLET ORAL EVERY 8 HOURS PRN
Status: ON HOLD | COMMUNITY
End: 2021-01-01

## 2021-01-01 RX ORDER — LACTOBACILLUS ACIDOPH-L.BULGARICUS 1 MILLION CELL CHEWABLE TABLET 1MM CELL
1 TABLET,CHEWABLE ORAL 2 TIMES DAILY
COMMUNITY
Start: 2021-01-01 | End: 2021-01-01

## 2021-01-01 RX ORDER — DOXYCYCLINE HYCLATE 100 MG
100 TABLET ORAL 2 TIMES DAILY
COMMUNITY
Start: 2021-01-01 | End: 2021-01-01

## 2021-01-01 RX ORDER — DOXYCYCLINE HYCLATE 50 MG/1
100 CAPSULE ORAL
Status: DISCONTINUED | OUTPATIENT
Start: 2021-01-01 | End: 2021-01-01 | Stop reason: CLARIF

## 2021-01-01 RX ORDER — QUETIAPINE FUMARATE 25 MG/1
25 TABLET, FILM COATED ORAL
Status: DISCONTINUED | OUTPATIENT
Start: 2021-01-01 | End: 2021-01-01

## 2021-01-01 RX ORDER — QUETIAPINE FUMARATE 50 MG/1
50 TABLET, FILM COATED ORAL AT BEDTIME
COMMUNITY

## 2021-01-01 RX ORDER — ESCITALOPRAM OXALATE 20 MG/1
20 TABLET ORAL DAILY
COMMUNITY
End: 2021-01-01

## 2021-01-01 RX ORDER — DOXYCYCLINE 100 MG/10ML
100 INJECTION, POWDER, LYOPHILIZED, FOR SOLUTION INTRAVENOUS EVERY 12 HOURS
Status: DISCONTINUED | OUTPATIENT
Start: 2021-01-01 | End: 2021-01-01

## 2021-01-01 RX ORDER — METHOCARBAMOL 500 MG/1
500 TABLET, FILM COATED ORAL EVERY 6 HOURS PRN
Status: DISCONTINUED | OUTPATIENT
Start: 2021-01-01 | End: 2021-01-01 | Stop reason: HOSPADM

## 2021-01-01 RX ORDER — PANTOPRAZOLE SODIUM 40 MG/1
40 TABLET, DELAYED RELEASE ORAL 2 TIMES DAILY
COMMUNITY

## 2021-01-01 RX ORDER — CEFAZOLIN SODIUM 1 G/50ML
1250 SOLUTION INTRAVENOUS ONCE
Status: COMPLETED | OUTPATIENT
Start: 2021-01-01 | End: 2021-01-01

## 2021-01-01 RX ORDER — HYDROXYZINE HYDROCHLORIDE 25 MG/1
25 TABLET, FILM COATED ORAL 3 TIMES DAILY
Status: DISCONTINUED | OUTPATIENT
Start: 2021-01-01 | End: 2021-01-01 | Stop reason: HOSPADM

## 2021-01-01 RX ORDER — OXYCODONE HYDROCHLORIDE 5 MG/1
10 TABLET ORAL EVERY 4 HOURS PRN
Status: DISCONTINUED | OUTPATIENT
Start: 2021-01-01 | End: 2021-01-01 | Stop reason: HOSPADM

## 2021-01-01 RX ORDER — METOPROLOL SUCCINATE 25 MG/1
25 TABLET, EXTENDED RELEASE ORAL DAILY
Status: DISCONTINUED | OUTPATIENT
Start: 2021-01-01 | End: 2021-01-01 | Stop reason: HOSPADM

## 2021-01-01 RX ORDER — ALBUTEROL SULFATE 90 UG/1
2 AEROSOL, METERED RESPIRATORY (INHALATION) 3 TIMES DAILY
COMMUNITY

## 2021-01-01 RX ORDER — PROCHLORPERAZINE MALEATE 5 MG
5 TABLET ORAL EVERY 6 HOURS PRN
Status: DISCONTINUED | OUTPATIENT
Start: 2021-01-01 | End: 2021-01-01 | Stop reason: HOSPADM

## 2021-01-01 RX ORDER — DOCUSATE SODIUM 100 MG/1
100 CAPSULE, LIQUID FILLED ORAL 2 TIMES DAILY
Status: DISCONTINUED | OUTPATIENT
Start: 2021-01-01 | End: 2021-01-01 | Stop reason: CLARIF

## 2021-01-01 RX ORDER — HYDROXYZINE HYDROCHLORIDE 25 MG/1
25 TABLET, FILM COATED ORAL EVERY 8 HOURS PRN
COMMUNITY
End: 2021-01-01

## 2021-01-01 RX ORDER — ESCITALOPRAM OXALATE 20 MG/1
20 TABLET ORAL DAILY
COMMUNITY

## 2021-01-01 RX ORDER — DOXYCYCLINE 100 MG/1
100 CAPSULE ORAL 2 TIMES DAILY
Status: DISCONTINUED | OUTPATIENT
Start: 2021-01-01 | End: 2021-01-01 | Stop reason: HOSPADM

## 2021-01-01 RX ORDER — CALCIUM CARBONATE 500 MG/1
500 TABLET, CHEWABLE ORAL 4 TIMES DAILY PRN
Status: DISCONTINUED | OUTPATIENT
Start: 2021-01-01 | End: 2021-01-01 | Stop reason: HOSPADM

## 2021-01-01 RX ORDER — TAMSULOSIN HYDROCHLORIDE 0.4 MG/1
0.4 CAPSULE ORAL DAILY
COMMUNITY

## 2021-01-01 RX ORDER — LORAZEPAM 2 MG/ML
0.5 INJECTION INTRAMUSCULAR ONCE
Status: COMPLETED | OUTPATIENT
Start: 2021-01-01 | End: 2021-01-01

## 2021-01-01 RX ORDER — SODIUM CHLORIDE, SODIUM LACTATE, POTASSIUM CHLORIDE, CALCIUM CHLORIDE 600; 310; 30; 20 MG/100ML; MG/100ML; MG/100ML; MG/100ML
INJECTION, SOLUTION INTRAVENOUS CONTINUOUS
Status: DISCONTINUED | OUTPATIENT
Start: 2021-01-01 | End: 2021-01-01 | Stop reason: CLARIF

## 2021-01-01 RX ORDER — OXYCODONE HYDROCHLORIDE 5 MG/1
5 TABLET ORAL EVERY 4 HOURS PRN
Qty: 20 TABLET | Refills: 0 | Status: SHIPPED | OUTPATIENT
Start: 2021-01-01 | End: 2021-01-01

## 2021-01-01 RX ORDER — FLUCONAZOLE 150 MG/1
150 TABLET ORAL ONCE
Status: COMPLETED | OUTPATIENT
Start: 2021-01-01 | End: 2021-01-01

## 2021-01-01 RX ORDER — ACETAMINOPHEN 325 MG/1
975 TABLET ORAL 3 TIMES DAILY
Status: DISCONTINUED | OUTPATIENT
Start: 2021-01-01 | End: 2021-01-01 | Stop reason: HOSPADM

## 2021-01-01 RX ORDER — ONDANSETRON 2 MG/ML
4 INJECTION INTRAMUSCULAR; INTRAVENOUS EVERY 6 HOURS PRN
Status: DISCONTINUED | OUTPATIENT
Start: 2021-01-01 | End: 2021-01-01 | Stop reason: HOSPADM

## 2021-01-01 RX ORDER — DOXYCYCLINE 100 MG/1
100 CAPSULE ORAL 2 TIMES DAILY
Qty: 14 CAPSULE | Refills: 0 | Status: SHIPPED | OUTPATIENT
Start: 2021-01-01 | End: 2021-01-01

## 2021-01-01 RX ORDER — HYDROMORPHONE HCL IN WATER/PF 6 MG/30 ML
0.4 PATIENT CONTROLLED ANALGESIA SYRINGE INTRAVENOUS
Status: DISCONTINUED | OUTPATIENT
Start: 2021-01-01 | End: 2021-01-01 | Stop reason: HOSPADM

## 2021-01-01 RX ORDER — ACETAMINOPHEN 325 MG/1
975 TABLET ORAL 3 TIMES DAILY
COMMUNITY
End: 2021-01-01

## 2021-01-01 RX ORDER — BISACODYL 10 MG
10 SUPPOSITORY, RECTAL RECTAL DAILY PRN
Status: DISCONTINUED | OUTPATIENT
Start: 2021-01-01 | End: 2021-01-01 | Stop reason: HOSPADM

## 2021-01-01 RX ORDER — SENNOSIDES 8.6 MG
650 CAPSULE ORAL EVERY 8 HOURS PRN
COMMUNITY
End: 2021-01-01

## 2021-01-01 RX ORDER — LANOLIN ALCOHOL/MO/W.PET/CERES
1000 CREAM (GRAM) TOPICAL DAILY
Status: DISCONTINUED | OUTPATIENT
Start: 2021-01-01 | End: 2021-01-01 | Stop reason: HOSPADM

## 2021-01-01 RX ORDER — QUETIAPINE FUMARATE 25 MG/1
25 TABLET, FILM COATED ORAL 2 TIMES DAILY
COMMUNITY

## 2021-01-01 RX ORDER — TORSEMIDE 20 MG/1
20 TABLET ORAL 2 TIMES DAILY
Status: DISCONTINUED | OUTPATIENT
Start: 2021-01-01 | End: 2021-01-01 | Stop reason: HOSPADM

## 2021-01-01 RX ORDER — METOPROLOL SUCCINATE 25 MG/1
25 TABLET, EXTENDED RELEASE ORAL DAILY
Qty: 30 TABLET | Refills: 1 | Status: SHIPPED | OUTPATIENT
Start: 2021-01-01

## 2021-01-01 RX ORDER — PANTOPRAZOLE SODIUM 20 MG/1
40 TABLET, DELAYED RELEASE ORAL
Status: DISCONTINUED | OUTPATIENT
Start: 2021-01-01 | End: 2021-01-01 | Stop reason: CLARIF

## 2021-01-01 RX ORDER — ALBUTEROL SULFATE 90 UG/1
2 AEROSOL, METERED RESPIRATORY (INHALATION) 3 TIMES DAILY
Status: DISCONTINUED | OUTPATIENT
Start: 2021-01-01 | End: 2021-01-01 | Stop reason: HOSPADM

## 2021-01-01 RX ORDER — TAMSULOSIN HYDROCHLORIDE 0.4 MG/1
0.4 CAPSULE ORAL DAILY
Qty: 30 CAPSULE | Refills: 0 | Status: SHIPPED | OUTPATIENT
Start: 2021-01-01

## 2021-01-01 RX ADMIN — PANTOPRAZOLE SODIUM 40 MG: 40 INJECTION, POWDER, FOR SOLUTION INTRAVENOUS at 09:30

## 2021-01-01 RX ADMIN — CYANOCOBALAMIN TAB 1000 MCG 1000 MCG: 1000 TAB at 20:01

## 2021-01-01 RX ADMIN — ACETAMINOPHEN 975 MG: 325 TABLET ORAL at 19:31

## 2021-01-01 RX ADMIN — DOXYCYCLINE 100 MG: 100 CAPSULE ORAL at 08:13

## 2021-01-01 RX ADMIN — ALBUTEROL SULFATE 2 PUFF: 90 AEROSOL, METERED RESPIRATORY (INHALATION) at 20:00

## 2021-01-01 RX ADMIN — QUETIAPINE FUMARATE 25 MG: 25 TABLET ORAL at 10:51

## 2021-01-01 RX ADMIN — ESCITALOPRAM OXALATE 20 MG: 20 TABLET ORAL at 09:11

## 2021-01-01 RX ADMIN — CYANOCOBALAMIN TAB 1000 MCG 1000 MCG: 1000 TAB at 08:50

## 2021-01-01 RX ADMIN — DOXYCYCLINE 100 MG: 100 INJECTION, POWDER, LYOPHILIZED, FOR SOLUTION INTRAVENOUS at 19:32

## 2021-01-01 RX ADMIN — ALBUTEROL SULFATE 2 PUFF: 90 AEROSOL, METERED RESPIRATORY (INHALATION) at 08:56

## 2021-01-01 RX ADMIN — PIPERACILLIN AND TAZOBACTAM 3.38 G: 3; .375 INJECTION, POWDER, LYOPHILIZED, FOR SOLUTION INTRAVENOUS at 07:05

## 2021-01-01 RX ADMIN — POTASSIUM CHLORIDE 10 MEQ: 750 TABLET, EXTENDED RELEASE ORAL at 09:11

## 2021-01-01 RX ADMIN — METOPROLOL SUCCINATE 25 MG: 25 TABLET, EXTENDED RELEASE ORAL at 12:15

## 2021-01-01 RX ADMIN — Medication 1 CAPSULE: at 12:04

## 2021-01-01 RX ADMIN — ESCITALOPRAM OXALATE 20 MG: 20 TABLET ORAL at 08:56

## 2021-01-01 RX ADMIN — ASPIRIN 81 MG CHEWABLE TABLET 81 MG: 81 TABLET CHEWABLE at 08:56

## 2021-01-01 RX ADMIN — HYDROXYZINE HYDROCHLORIDE 25 MG: 25 TABLET ORAL at 21:31

## 2021-01-01 RX ADMIN — NICOTINE 7 MG/24 HR DAILY TRANSDERMAL PATCH 1 PATCH: at 09:31

## 2021-01-01 RX ADMIN — METOPROLOL SUCCINATE 25 MG: 25 TABLET, EXTENDED RELEASE ORAL at 09:51

## 2021-01-01 RX ADMIN — PIPERACILLIN AND TAZOBACTAM 3.38 G: 3; .375 INJECTION, POWDER, LYOPHILIZED, FOR SOLUTION INTRAVENOUS at 23:56

## 2021-01-01 RX ADMIN — FERROUS SULFATE TAB 325 MG (65 MG ELEMENTAL FE) 325 MG: 325 (65 FE) TAB at 08:50

## 2021-01-01 RX ADMIN — HYDROXYZINE HYDROCHLORIDE 25 MG: 25 TABLET, FILM COATED ORAL at 09:31

## 2021-01-01 RX ADMIN — HYDROXYZINE HYDROCHLORIDE 25 MG: 25 TABLET ORAL at 13:20

## 2021-01-01 RX ADMIN — Medication 1 CAPSULE: at 12:38

## 2021-01-01 RX ADMIN — Medication 1 CAPSULE: at 16:58

## 2021-01-01 RX ADMIN — ACETAMINOPHEN 975 MG: 325 TABLET ORAL at 14:19

## 2021-01-01 RX ADMIN — HYDROXYZINE HYDROCHLORIDE 25 MG: 25 TABLET, FILM COATED ORAL at 20:57

## 2021-01-01 RX ADMIN — HYDROXYZINE HYDROCHLORIDE 25 MG: 25 TABLET, FILM COATED ORAL at 12:58

## 2021-01-01 RX ADMIN — PIPERACILLIN AND TAZOBACTAM 3.38 G: 3; .375 INJECTION, POWDER, LYOPHILIZED, FOR SOLUTION INTRAVENOUS at 21:54

## 2021-01-01 RX ADMIN — ESCITALOPRAM OXALATE 20 MG: 20 TABLET ORAL at 09:59

## 2021-01-01 RX ADMIN — ASPIRIN 81 MG CHEWABLE TABLET 81 MG: 81 TABLET CHEWABLE at 20:00

## 2021-01-01 RX ADMIN — ALBUTEROL SULFATE 2 PUFF: 90 AEROSOL, METERED RESPIRATORY (INHALATION) at 21:31

## 2021-01-01 RX ADMIN — TORSEMIDE 20 MG: 20 TABLET ORAL at 08:50

## 2021-01-01 RX ADMIN — ALBUTEROL SULFATE 2 PUFF: 90 AEROSOL, METERED RESPIRATORY (INHALATION) at 19:31

## 2021-01-01 RX ADMIN — NYSTATIN: 100000 CREAM TOPICAL at 08:57

## 2021-01-01 RX ADMIN — ALBUTEROL SULFATE 2 PUFF: 90 AEROSOL, METERED RESPIRATORY (INHALATION) at 10:00

## 2021-01-01 RX ADMIN — ALBUTEROL SULFATE 2 PUFF: 90 AEROSOL, METERED RESPIRATORY (INHALATION) at 13:20

## 2021-01-01 RX ADMIN — FERROUS SULFATE TAB 325 MG (65 MG ELEMENTAL FE) 325 MG: 325 (65 FE) TAB at 09:29

## 2021-01-01 RX ADMIN — HYDROXYZINE HYDROCHLORIDE 25 MG: 25 TABLET, FILM COATED ORAL at 14:32

## 2021-01-01 RX ADMIN — Medication 1 CAPSULE: at 12:15

## 2021-01-01 RX ADMIN — DOXYCYCLINE 100 MG: 100 CAPSULE ORAL at 12:04

## 2021-01-01 RX ADMIN — PIPERACILLIN AND TAZOBACTAM 3.38 G: 3; .375 INJECTION, POWDER, LYOPHILIZED, FOR SOLUTION INTRAVENOUS at 14:59

## 2021-01-01 RX ADMIN — OXYCODONE HYDROCHLORIDE 5 MG: 5 TABLET ORAL at 21:54

## 2021-01-01 RX ADMIN — HYDROXYZINE HYDROCHLORIDE 25 MG: 25 TABLET ORAL at 09:59

## 2021-01-01 RX ADMIN — HYDROXYZINE HYDROCHLORIDE 25 MG: 25 TABLET ORAL at 09:52

## 2021-01-01 RX ADMIN — ASPIRIN 81 MG CHEWABLE TABLET 81 MG: 81 TABLET CHEWABLE at 19:31

## 2021-01-01 RX ADMIN — PIPERACILLIN AND TAZOBACTAM 3.38 G: 3; .375 INJECTION, POWDER, LYOPHILIZED, FOR SOLUTION INTRAVENOUS at 21:32

## 2021-01-01 RX ADMIN — CYANOCOBALAMIN TAB 1000 MCG 1000 MCG: 1000 TAB at 21:54

## 2021-01-01 RX ADMIN — TORSEMIDE 20 MG: 20 TABLET ORAL at 09:11

## 2021-01-01 RX ADMIN — DOXYCYCLINE 100 MG: 100 CAPSULE ORAL at 12:38

## 2021-01-01 RX ADMIN — QUETIAPINE FUMARATE 25 MG: 25 TABLET ORAL at 20:00

## 2021-01-01 RX ADMIN — FERROUS SULFATE TAB 325 MG (65 MG ELEMENTAL FE) 325 MG: 325 (65 FE) TAB at 09:11

## 2021-01-01 RX ADMIN — ACETAMINOPHEN 975 MG: 325 TABLET ORAL at 09:59

## 2021-01-01 RX ADMIN — NYSTATIN: 100000 CREAM TOPICAL at 20:00

## 2021-01-01 RX ADMIN — HYDROXYZINE HYDROCHLORIDE 25 MG: 25 TABLET ORAL at 08:12

## 2021-01-01 RX ADMIN — METOPROLOL SUCCINATE 25 MG: 25 TABLET, EXTENDED RELEASE ORAL at 08:56

## 2021-01-01 RX ADMIN — LORAZEPAM 0.5 MG: 2 INJECTION INTRAMUSCULAR; INTRAVENOUS at 18:46

## 2021-01-01 RX ADMIN — TORSEMIDE 20 MG: 20 TABLET ORAL at 20:56

## 2021-01-01 RX ADMIN — PIPERACILLIN AND TAZOBACTAM 3.38 G: 3; .375 INJECTION, POWDER, LYOPHILIZED, FOR SOLUTION INTRAVENOUS at 17:17

## 2021-01-01 RX ADMIN — ASPIRIN 81 MG CHEWABLE TABLET 81 MG: 81 TABLET CHEWABLE at 09:59

## 2021-01-01 RX ADMIN — ACETAMINOPHEN 975 MG: 325 TABLET ORAL at 08:55

## 2021-01-01 RX ADMIN — ASPIRIN 81 MG CHEWABLE TABLET 81 MG: 81 TABLET CHEWABLE at 08:12

## 2021-01-01 RX ADMIN — CYANOCOBALAMIN TAB 1000 MCG 1000 MCG: 1000 TAB at 21:31

## 2021-01-01 RX ADMIN — ESCITALOPRAM OXALATE 20 MG: 20 TABLET ORAL at 08:50

## 2021-01-01 RX ADMIN — PIPERACILLIN AND TAZOBACTAM 3.38 G: 3; .375 INJECTION, POWDER, LYOPHILIZED, FOR SOLUTION INTRAVENOUS at 14:19

## 2021-01-01 RX ADMIN — ALBUTEROL SULFATE 2 PUFF: 90 AEROSOL, METERED RESPIRATORY (INHALATION) at 08:20

## 2021-01-01 RX ADMIN — HYDROXYZINE HYDROCHLORIDE 25 MG: 25 TABLET, FILM COATED ORAL at 18:28

## 2021-01-01 RX ADMIN — HYDROXYZINE HYDROCHLORIDE 25 MG: 25 TABLET, FILM COATED ORAL at 18:13

## 2021-01-01 RX ADMIN — ACETAMINOPHEN 975 MG: 325 TABLET ORAL at 21:32

## 2021-01-01 RX ADMIN — HYDROXYZINE HYDROCHLORIDE 25 MG: 25 TABLET ORAL at 20:00

## 2021-01-01 RX ADMIN — ASPIRIN 81 MG CHEWABLE TABLET 81 MG: 81 TABLET CHEWABLE at 21:32

## 2021-01-01 RX ADMIN — PIPERACILLIN AND TAZOBACTAM 3.38 G: 3; .375 INJECTION, POWDER, LYOPHILIZED, FOR SOLUTION INTRAVENOUS at 05:40

## 2021-01-01 RX ADMIN — ASPIRIN 81 MG: 81 TABLET ORAL at 08:50

## 2021-01-01 RX ADMIN — PIPERACILLIN AND TAZOBACTAM 3.38 G: 3; .375 INJECTION, POWDER, LYOPHILIZED, FOR SOLUTION INTRAVENOUS at 06:39

## 2021-01-01 RX ADMIN — Medication 1 CAPSULE: at 07:05

## 2021-01-01 RX ADMIN — HYDROXYZINE HYDROCHLORIDE 25 MG: 25 TABLET ORAL at 19:43

## 2021-01-01 RX ADMIN — PIPERACILLIN AND TAZOBACTAM 3.38 G: 3; .375 INJECTION, POWDER, LYOPHILIZED, FOR SOLUTION INTRAVENOUS at 15:07

## 2021-01-01 RX ADMIN — HYDROXYZINE HYDROCHLORIDE 25 MG: 25 TABLET, FILM COATED ORAL at 08:50

## 2021-01-01 RX ADMIN — POTASSIUM CHLORIDE 10 MEQ: 750 TABLET, EXTENDED RELEASE ORAL at 09:30

## 2021-01-01 RX ADMIN — ACETAMINOPHEN 975 MG: 325 TABLET ORAL at 20:00

## 2021-01-01 RX ADMIN — CYANOCOBALAMIN TAB 1000 MCG 1000 MCG: 1000 TAB at 09:11

## 2021-01-01 RX ADMIN — DOXYCYCLINE 100 MG: 100 INJECTION, POWDER, LYOPHILIZED, FOR SOLUTION INTRAVENOUS at 11:58

## 2021-01-01 RX ADMIN — PIPERACILLIN AND TAZOBACTAM 3.38 G: 3; .375 INJECTION, POWDER, LYOPHILIZED, FOR SOLUTION INTRAVENOUS at 22:00

## 2021-01-01 RX ADMIN — FLUCONAZOLE 150 MG: 150 TABLET ORAL at 09:15

## 2021-01-01 RX ADMIN — ACETAMINOPHEN 650 MG: 325 TABLET, FILM COATED ORAL at 14:32

## 2021-01-01 RX ADMIN — ACETAMINOPHEN 975 MG: 325 TABLET ORAL at 20:01

## 2021-01-01 RX ADMIN — ACETAMINOPHEN 975 MG: 325 TABLET ORAL at 13:53

## 2021-01-01 RX ADMIN — HYDROXYZINE HYDROCHLORIDE 25 MG: 25 TABLET ORAL at 08:54

## 2021-01-01 RX ADMIN — PANTOPRAZOLE SODIUM 40 MG: 40 INJECTION, POWDER, FOR SOLUTION INTRAVENOUS at 08:18

## 2021-01-01 RX ADMIN — PANTOPRAZOLE SODIUM 40 MG: 40 INJECTION, POWDER, FOR SOLUTION INTRAVENOUS at 09:52

## 2021-01-01 RX ADMIN — PANTOPRAZOLE SODIUM 40 MG: 20 TABLET, DELAYED RELEASE ORAL at 06:36

## 2021-01-01 RX ADMIN — ASPIRIN 81 MG CHEWABLE TABLET 81 MG: 81 TABLET CHEWABLE at 09:51

## 2021-01-01 RX ADMIN — IRON SUCROSE 500 MG: 20 INJECTION, SOLUTION INTRAVENOUS at 09:55

## 2021-01-01 RX ADMIN — ACETAMINOPHEN 650 MG: 325 TABLET, FILM COATED ORAL at 09:29

## 2021-01-01 RX ADMIN — ASPIRIN 81 MG: 81 TABLET ORAL at 20:56

## 2021-01-01 RX ADMIN — PIPERACILLIN AND TAZOBACTAM 3.38 G: 3; .375 INJECTION, POWDER, LYOPHILIZED, FOR SOLUTION INTRAVENOUS at 06:38

## 2021-01-01 RX ADMIN — POTASSIUM CHLORIDE 10 MEQ: 750 TABLET, EXTENDED RELEASE ORAL at 08:50

## 2021-01-01 RX ADMIN — HYDROXYZINE HYDROCHLORIDE 25 MG: 25 TABLET ORAL at 13:53

## 2021-01-01 RX ADMIN — HYDROXYZINE HYDROCHLORIDE 25 MG: 25 TABLET ORAL at 14:19

## 2021-01-01 RX ADMIN — ESCITALOPRAM OXALATE 20 MG: 20 TABLET ORAL at 08:12

## 2021-01-01 RX ADMIN — ESCITALOPRAM OXALATE 20 MG: 20 TABLET ORAL at 09:30

## 2021-01-01 RX ADMIN — ACETAMINOPHEN 975 MG: 325 TABLET ORAL at 08:13

## 2021-01-01 RX ADMIN — ACETAMINOPHEN 975 MG: 325 TABLET ORAL at 13:19

## 2021-01-01 RX ADMIN — TORSEMIDE 20 MG: 20 TABLET ORAL at 09:29

## 2021-01-01 RX ADMIN — CYANOCOBALAMIN TAB 1000 MCG 1000 MCG: 1000 TAB at 09:29

## 2021-01-01 RX ADMIN — DOXYCYCLINE 100 MG: 100 CAPSULE ORAL at 08:56

## 2021-01-01 RX ADMIN — HYDROXYZINE HYDROCHLORIDE 25 MG: 25 TABLET ORAL at 20:01

## 2021-01-01 RX ADMIN — DOXYCYCLINE 100 MG: 100 INJECTION, POWDER, LYOPHILIZED, FOR SOLUTION INTRAVENOUS at 02:01

## 2021-01-01 RX ADMIN — Medication 1 CAPSULE: at 06:36

## 2021-01-01 RX ADMIN — ALBUTEROL SULFATE 2 PUFF: 90 AEROSOL, METERED RESPIRATORY (INHALATION) at 13:54

## 2021-01-01 RX ADMIN — ESCITALOPRAM OXALATE 20 MG: 20 TABLET ORAL at 09:52

## 2021-01-01 RX ADMIN — CYANOCOBALAMIN TAB 1000 MCG 1000 MCG: 1000 TAB at 20:00

## 2021-01-01 RX ADMIN — Medication 1 CAPSULE: at 05:50

## 2021-01-01 RX ADMIN — FERROUS SULFATE TAB 325 MG (65 MG ELEMENTAL FE) 325 MG: 325 (65 FE) TAB at 20:57

## 2021-01-01 RX ADMIN — NICOTINE 7 MG/24 HR DAILY TRANSDERMAL PATCH 1 PATCH: at 08:51

## 2021-01-01 RX ADMIN — ACETAMINOPHEN 975 MG: 325 TABLET ORAL at 09:52

## 2021-01-01 RX ADMIN — QUETIAPINE FUMARATE 25 MG: 25 TABLET ORAL at 08:56

## 2021-01-01 RX ADMIN — HYDROXYZINE HYDROCHLORIDE 25 MG: 25 TABLET, FILM COATED ORAL at 09:11

## 2021-01-01 RX ADMIN — VANCOMYCIN HYDROCHLORIDE 1250 MG: 5 INJECTION, POWDER, LYOPHILIZED, FOR SOLUTION INTRAVENOUS at 14:30

## 2021-01-01 RX ADMIN — METOPROLOL SUCCINATE 25 MG: 25 TABLET, EXTENDED RELEASE ORAL at 08:16

## 2021-01-01 RX ADMIN — POTASSIUM CHLORIDE 10 MEQ: 750 TABLET, EXTENDED RELEASE ORAL at 20:57

## 2021-01-01 RX ADMIN — ALBUTEROL SULFATE 2 PUFF: 90 AEROSOL, METERED RESPIRATORY (INHALATION) at 14:19

## 2021-01-01 RX ADMIN — HYDROXYZINE HYDROCHLORIDE 25 MG: 25 TABLET, FILM COATED ORAL at 14:11

## 2021-01-01 RX ADMIN — ALBUTEROL SULFATE 2 PUFF: 90 AEROSOL, METERED RESPIRATORY (INHALATION) at 09:55

## 2021-01-01 RX ADMIN — QUETIAPINE FUMARATE 25 MG: 25 TABLET ORAL at 20:51

## 2021-01-01 RX ADMIN — NICOTINE 7 MG/24 HR DAILY TRANSDERMAL PATCH 1 PATCH: at 09:12

## 2021-01-01 RX ADMIN — ASPIRIN 81 MG: 81 TABLET ORAL at 09:11

## 2021-01-01 ASSESSMENT — MIFFLIN-ST. JEOR
SCORE: 1402.47
SCORE: 1404.74
SCORE: 1348.75
SCORE: 1359.39
SCORE: 1345.78
SCORE: 1329.45
SCORE: 1304.96
SCORE: 1407.76
SCORE: 1349.41
SCORE: 1333.99
SCORE: 1346.69
SCORE: 1349.87
SCORE: 1323.56
SCORE: 1286.82
SCORE: 1407.75
SCORE: 1334.9

## 2021-01-01 ASSESSMENT — ACTIVITIES OF DAILY LIVING (ADL)
DIFFICULTY_COMMUNICATING: NO
DIFFICULTY_EATING/SWALLOWING: NO
TOILETING_ISSUES: YES
DRESSING/BATHING_DIFFICULTY: NO
DEPENDENT_IADLS:: TRANSPORTATION;MONEY MANAGEMENT;MEDICATION MANAGEMENT;SHOPPING
WEAR_GLASSES_OR_BLIND: YES

## 2021-06-09 NOTE — PROGRESS NOTES
Bone Marrow Biopsy and Aspiration Procedure Note     Informed consent was obtained and potential risks including bleeding, infection and pain were reviewed with the patient.     Posterior iliac crest(s) prepped with Betadine.     Lidocaine 1% local anesthesia infiltrated into the subcutaneous tissue.    Right PIC bone marrow biopsy and bone marrow aspirate was obtained.     The procedure was tolerated well and there were no complications.    Specimens sent for: routine.    Physician: James Hernández

## 2021-06-09 NOTE — PROGRESS NOTES
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    Assessment and Plan      ECOG Performance   ECOG Performance Status: 2    Distress Assessment  Distress Assessment Score: 9: Please see the discussion below.    Pain  Pain Score (Initial OR Reassessment): Unable to Assess    Mr. Dread Justice is a 85 y.o. gentleman with chronic kidney disease and chronic bilateral lower extremity edema, who in the course of work-up for the CKD was found to have a 0.7 g/dL monoclonal spike in the SPEP that was done on 4/29/2020.  The serum free light chains also showed a preponderance of the kappa light chain relative to the lambda light chain with a kappa to lambda ratio of 3.01.  His creatinine level has been variable in the 1.4 to that 2.5 range.  He has also been noted to have a normochromic normocytic anemia.  The previous iron panel and ferritin appeared to show iron deficiency.    He has numerous comorbidities.  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  He still smokes about half pack per day.  He is very clear that he is not willing to quit and does not want to discuss about that anymore.    1.  Regarding the monoclonal gammopathy of unknown significance: I discussed with him the results of the work-up that has been done so far.  In the x-ray bone skeletal survey, there was extensive degenerative disease but there was no lytic bony lesions seen.  He did have a normochromic normocytic anemia with a hemoglobin of 0.3 on  6/29/2020 which is an improvement over what he had earlier.  The WBC count and platelet count are normal.  In the peripheral blood smear there was no dysplastic or abnormal cells seen.  The reticulocyte count was not appropriately elevated.  The metabolic panel his creatinine had improved to 1.38.  He did not have hypercalcemia.  In the SPEP he was found to have an increased monoclonal colopathy at 1.1 g/dL which in the serum monofixation was an IgG kappa monoclonal globulin.  The quantity immunoglobulins showed that the IgG and IgA were normal but the IgM was slightly decreased.  Serum free light chains again showed an increased kappa related to lambda with a kappa to lambda ratio of 3.65 which is abnormal.    We do not have the results of the 24-hour urine collection yet.  This was discussed with the pathology technician.  Luminally they can tell us that there is a small amount of urine protein found and also some monoclonal protein in the urine also.    I discussed with him that with the above findings, we cannot rule out the possibility that he has underlying myeloma.  Thus I do recommend doing a bone marrow aspiration biopsy to find out whether he has myeloma.  I pointed out to him that if he has myeloma then we may be able to treat him with some light chemotherapies which can prolong his life.    He had a lot of questions about the bone marrow biopsy.  I went through the procedure in detail with him.  He went back and forth about this decision but finally he decided that he would like to know whether he has the cancer or not and so he decided that he would like to have the bone marrow biopsy done.  I will request for a unilateral bone marrow aspiration biopsy to be done.    2. I explained to the patient that we will need a uilateral bone marrow biopsy and aspiration to complete staging.  The rationale was explained.  I went through the procedure in detail.  Discussed the possible risks of pain, bleeding, local  infection, adverse reaction to one of the medications given for pain.  After thorough discussion a consent form was signed.  I have ordered a unilateral bone marrow biopsy and aspiration with samples sent for morphology, flow cytometry and cytogenetics. I am also requesting for FISH studies for myeloma prognosis to be sent.  The bone marrow biopsy will be done by one of our pathologists.  This will be scheduled next available.    3.  I then discussed with him about the anemia work-up.  As mentioned above the reticulocyte count was not elevated appropriately.  He was still iron deficient because his ferritin level was only 22.  Serum folate level was normal.  Vitamin B12 level was low at 171.  Thus it appears that in addition to iron deficiency he also has some vitamin B12 deficiency.  --I asked him to start taking vitamin B12 (cyanocobalamin) 1000 mcg or 2000 mcg p.o. daily.  I have given him a paper prescription.  I think this should be enough for the vitamin B12 deficiency.    --I discussed with the patient and his friend that I suspect that he has some ongoing GI blood loss and that is why he is still iron deficient even after taking the oral iron supplementation.  Certainly there has been an improvement in his hemoglobin after taking the oral iron supplement.  He should continue with the ferrous sulfate tablets daily.  I asked him to consider the possibility of going for a GI consultation with an upper endoscopy and a colonoscopy to find the source of bleeding.  I pointed out to him that he did have peptic ulcers in the past.  At this point the patient is a bit reluctant to let me refer him to GI.  He would like to discuss with his primary physician about this.    4.  Follow-up: I have asked for the bone marrow biopsy to be scheduled soon according to the patient's convenience.  To see me in follow-up 1 week after the bone marrow biopsy is done.    Time spend >40 minutes face to face with the patient. More than  50 % in counseling and coordination of care.                  Problem List    1. MGUS (monoclonal gammopathy of unknown significance)  lidocaine (PF) 10 mg/mL (1 %) injection 10 mL (XYLOCAINE-MPF)    Bone Marrow Biopsy and Exam    Flow cytometry    HM1(CBC and Differential)    Chromosome Analysis, Bone Marrow    Bone Marrow Biopsy    Bone Marrow Aspiration    LORazepam tablet 1 mg (ATIVAN)    oxyCODONE-acetaminophen 5-325 mg 2 tablet (PERCOCET/ENDOCET)    naloxone injection 0.2-0.4 mg (NARCAN)    naloxone injection 0.2-0.4 mg (NARCAN)   2. Normochromic normocytic anemia  lidocaine (PF) 10 mg/mL (1 %) injection 10 mL (XYLOCAINE-MPF)    Bone Marrow Biopsy and Exam    Flow cytometry    HM1(CBC and Differential)    Chromosome Analysis, Bone Marrow    Bone Marrow Biopsy    Bone Marrow Aspiration    LORazepam tablet 1 mg (ATIVAN)    oxyCODONE-acetaminophen 5-325 mg 2 tablet (PERCOCET/ENDOCET)    naloxone injection 0.2-0.4 mg (NARCAN)    naloxone injection 0.2-0.4 mg (NARCAN)    cyanocobalamin 1000 MCG tablet   3. CKD (chronic kidney disease) stage 3, GFR 30-59 ml/min (H)  lidocaine (PF) 10 mg/mL (1 %) injection 10 mL (XYLOCAINE-MPF)    Bone Marrow Biopsy and Exam    Flow cytometry    HM1(CBC and Differential)    Chromosome Analysis, Bone Marrow    Bone Marrow Biopsy    Bone Marrow Aspiration    LORazepam tablet 1 mg (ATIVAN)    oxyCODONE-acetaminophen 5-325 mg 2 tablet (PERCOCET/ENDOCET)    naloxone injection 0.2-0.4 mg (NARCAN)    naloxone injection 0.2-0.4 mg (NARCAN)   4. Other vitamin B12 deficiency anemia  cyanocobalamin 1000 MCG tablet        CC: Greg Carroll MD Alec Otteman, MD    ______________________________________________________________________________    History of Present Illness    Mr. Dread Justice he is here for follow-up with his friend Moi.    He states that symptomatically he feels about the same as when he saw me last week.  He has been taking the potassium chloride tablets and wonders  whether that is why he has slightly more swelling in his feet.  Pain in the back remains about the same.  Energy is poor.  Vision is poor.  He can walk only a short distance.  He uses a cane for that.    Please see below.  A 14 point review of system is otherwise completely negative.    Pain Status  Currently in Pain: Yes    Review of Systems    Constitutional  Constitutional (WDL): All constitutional elements are within defined limits  Fatigue: Fatigue not relieved by rest - Limiting instrumental ADL  Fever: None  Chills: None  Weight Gain: None  Weight Loss: None  Neurosensory  Neurosensory (WDL): Exceptions to WDL  Ataxia: Moderate symptoms, limiting instrumental ADL(uses cane/walker)  Peripheral Sensory Neuropathy: None  Confusion: None  Syncope: None  Cardiovascular  Cardiovascular (WDL): Exceptions to WDL  Palpitations: None  Edema: Yes  Edema Limbs: Grade 2(Rt > Lt, to below knee)  Phlebitis: None  Superficial thrombophlebitis: None  Pulmonary  Respiratory (WDL): Exceptions to WDL  Cough: Mild symptoms, nonprescription intervention indicated  Dyspnea: Shortness of breath with minimal exertion, limiting instrumental ADL  Hypoxia: Decreased oxygen saturation with exercise (e.g., pulse oximeter <88%), intermittent supplemental oxygen  Gastrointestinal  Gastrointestinal (WDL): Exceptions to WDL  Anorexia: Loss of appetite without alteration in eating habits  Constipation: None(varies)  Diarrhea: None  Dysphagia: None  Esophagitis: None  Nausea: None  Pharyngitis: None  Vomiting: None  Dry Mouth: None  Genitourinary  Genitourinary (WDL): Exceptions to WDL  Urinary Frequency: Present  Urinary Retention: Urinary, suprapubic or intermittent catheter placement not indicated, able to void with some residual  Urinary Tract Pain: None  Integumentary  Integumentary (WDL): All integumentary elements are within defined limits  Patient Coping     Distress Assessment  Distress Assessment Score: 9  Accompanied by       Past  History  Past Medical History:   Diagnosis Date     Alcohol abuse      Cerebellar cerebrovascular accident without late effect 04/17/2005    Mild right-sided weakness.  MRI showed a 4.5 cm infarct in the left cerebellum.     Chronic headaches      COPD (chronic obstructive pulmonary disease) (H)      Depression      Iron deficiency anemia 10/03/2012     Macular degeneration, bilateral      Osteoarthritis      Peptic ulcer disease 1975    Secondary to alcohol use.     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  Diverticulosis, internal hemorrhoids.     ESOPHAGOGASTRODUODENOSCOPY  10/04/2012    Chronic gastritis.     INGUINAL HERNIA REPAIR Right 7/8/2014    Procedure: HERNIA REPAIR INGUINAL, RIGHT;  Surgeon: Mack Kowalski MD;  Location: [facility];  Service:      PICC  12/2/2014          TONSILLECTOMY  1949       Physical Exam    Recent Vitals 7/6/2020   Height -   Weight 188 lbs 5 oz   BSA (m2) 1.99 m2   /74   Pulse 67   Temp 98.3   Temp src 1   SpO2 95   Some recent data might be hidden       GENERAL: Alert and oriented to time place and person. Seated comfortably in a wheelchair. In no distress.  Normal build.  Pleasant.    He has a bag of [place]'s in his left pocket.  Does have the smell of tobacco about him.    HEAD: Atraumatic and normocephalic.    EYES: HUSSEIN, EOMI.  Mild Pallor?  No icterus.    Oral cavity: no mucosal lesion or tonsillar enlargement.    NECK: supple. JVP normal.  No thyroid enlargement.    LYMPH NODES: No palpable, cervical, axillary or inguinal lymphadenopathy.    CHEST: clear to auscultation bilaterally.  Symmetrical breath movements bilaterally.    CVS: S1 and S2 are heard. Regular rate and rhythm.  Bilateral leg edema is the same.    ABDOMEN: Soft. Not tender. Not  distended.  No palpable hepatomegaly or splenomegaly.    EXTREMITIES: Warm.    SKIN: no rash, or bruising or purpura.  He has male pattern baldness.      Lab Results    Recent Results (from the past 240 hour(s))   Immunoglobulins IgG, IgA, IgM   Result Value Ref Range    Immunoglobulin G 1,369 700-1,700 mg/dL    Immunoglobulin M 58 (L) 60 - 280 mg/dL    Immunoglobulin A 93 65 - 400 mg/dL   Electrophoresis, Protein, Serum   Result Value Ref Range    Albumin % 54.3 51.0 - 67.0 %    Albumin  3.6 3.2 - 4.7 g/dL    Alpha 1 % 4.0 2.0 - 4.0 %    Alpha 1 0.3 0.1 - 0.3 g/dL    Alpha 2 % 13.8 (H) 5.0 - 13.0 %    Alpha 2 0.9 0.4 - 0.9 g/dL    % Beta 11.1 10.0 - 17.0 %    Beta 0.7 0.7 - 1.2 g/dL    Gamma Globulin % 16.8 9.0 - 20.0 %    Gamma Globulin 1.1 0.6 - 1.4 g/dL    ELP Comment       ADDENDUM: Electrophoresis shows a symmetrical peak in the gamma globulin region consistent with a monoclonal gammopathy. The total value of the gamma globulin is 1.1 g/dL, and the monoclonal peak comprises nearly all of this.     A symmetric peak is present in the gamma globulin region of the tracing, and a clonal band is present in the corresponding region of the gel strip, indicating a monoclonal globulin. Serum immunofixation can further identify the abnormal protein.     Protein, Total 6.6 6.0 - 8.0 g/dL    Path ICD: D47.2     Interpreted By: Mani Silva MD     Immunofixation Electrophoresis, Serum   Result Value Ref Range    Immunofixation Electrophoresis, Serum       Clonal bands are visible in the IgG and kappa migration lanes, consistent with an IgG-kappa monoclonal gammopathy.    Path ICD: D47.2     Interpreted By: Mani Silva MD     Kappa/Lambda Quantitative Free Light Chains and Ratio, Serum(FLCS)   Result Value Ref Range    Kappa Free Lt Chain 5.87 (H) 0.33 - 1.94 mg/dL    Lambda Free Lt Chain 1.61 0.57 - 2.63 mg/dL    Kappa Lambda Ratio 3.65 (H) 0.26 - 1.65   Comprehensive Metabolic Panel   Result Value Ref Range     Sodium 143 136 - 145 mmol/L    Potassium 2.9 (L) 3.5 - 5.0 mmol/L    Chloride 101 98 - 107 mmol/L    CO2 29 22 - 31 mmol/L    Anion Gap, Calculation 13 5 - 18 mmol/L    Glucose 86 70 - 125 mg/dL    BUN 20 8 - 28 mg/dL    Creatinine 1.38 (H) 0.70 - 1.30 mg/dL    GFR MDRD Af Amer 59 (L) >60 mL/min/1.73m2    GFR MDRD Non Af Amer 49 (L) >60 mL/min/1.73m2    Bilirubin, Total 0.7 0.0 - 1.0 mg/dL    Calcium 8.7 8.5 - 10.5 mg/dL    Protein, Total 7.4 6.0 - 8.0 g/dL    Albumin 3.3 (L) 3.5 - 5.0 g/dL    Alkaline Phosphatase 147 (H) 45 - 120 U/L    AST 15 0 - 40 U/L    ALT 10 0 - 45 U/L   LD(LDH)   Result Value Ref Range    LD (LDH) 210 125 - 220 U/L   Morphology,Smear Review (MORP)   Result Value Ref Range    Pathology, Smear Review See Separate Pathology Report (!) (none)    WBC 6.3 4.0 - 11.0 thou/uL    RBC 3.34 (L) 4.40 - 6.20 mill/uL    Hemoglobin 10.3 (L) 14.0 - 18.0 g/dL    Hematocrit 31.4 (L) 40.0 - 54.0 %    MCV 94 80 - 100 fL    MCH 30.8 27.0 - 34.0 pg    MCHC 32.8 32.0 - 36.0 g/dL    RDW 16.2 (H) 11.0 - 14.5 %    Platelets 304 140 - 440 thou/uL    MPV 9.8 8.5 - 12.5 fL    Neutrophils % 66 50 - 70 %    Lymphocytes % 21 20 - 40 %    Monocytes % 10 2 - 10 %    Eosinophils % 4 0 - 6 %    Basophils % 0 0 - 2 %    Neutrophils Absolute 4.2 2.0 - 7.7 thou/uL    Lymphocytes Absolute 1.3 0.8 - 4.4 thou/uL    Monocytes Absolute 0.6 0.0 - 0.9 thou/uL    Eosinophils Absolute 0.2 0.0 - 0.4 thou/uL    Basophils Absolute 0.0 0.0 - 0.2 thou/uL   Vitamin B12   Result Value Ref Range    Vitamin B-12 171 (L) 213 - 816 pg/mL   Folate, Serum   Result Value Ref Range    Folate 9.9 >=3.5 ng/mL   TSH   Result Value Ref Range    TSH 1.59 0.30 - 5.00 uIU/mL   Reticulocytes   Result Value Ref Range    Retic Absolute Count 0.053 0.010 - 0.110 mill/uL    Retic Ct Pct 1.58 0.8 - 2.7 %   Ferritin   Result Value Ref Range    Ferritin 22 (L) 27 - 300 ng/mL   Peripheral Blood Smear, Path Review   Result Value Ref Range    Case Report        Peripheral Blood Morphology                       Case: [id number]                                   Authorizing Provider:  Mera Nascimento MD           Collected:           06/29/2020 1400              Ordering Location:     [facility] and Received:            06/29/2020 7399                                     Hematology                                                                   Pathologist:           James Hernández MD                                                        Specimen:    Peripheral Blood                                                                           Final Diagnosis       PERIPHERAL BLOOD SMEAR:     - NORMOCHROMIC-NORMOCYTIC ANEMIA    Comment       The clinical history has been reviewed.     Normocytic anemia can be caused by multiple etiologies including intrinsic bone marrow disease, renal disease, hepatic disease, endocrine disease, anemia of chronic disease, post-hemorrhagic anemia, and bone marrow infiltration by tumors. Recommend clinical correlation and follow-up.    Clinical Information D47.2  D64.9     Peripheral Smear       Red blood cells are decreased in number but overall normochromic and normocytic. Anisopoikilocytosis and polychromasia are slightly increased, but rouleaux formation does not appear prominent.     The white blood cell count and differential appear as reported on the CBC. Leukocytes are normal in number and appearance, consisting predominantly of segmented and band neutrophils with fewer numbers of lymphocytes and monocytes. No blasts or dysplastic changes are identified.    Platelets are normal in number and appearance.    Charges CPT: 19227  ICD-10: D64.9          Imaging    Xr Bone Survey Complete    Result Date: 6/29/2020  EXAM: XR BONE SURVEY COMPLETE LOCATION: [facility] DATE/TIME: 6/29/2020 2:52 PM INDICATION: Monoclonal gammopathy. COMPARISON: CT of thoracic and lumbar spine 11/28/2014.     Degenerative changes are present  within the spine. No lytic lesions identified to suggest multiple myeloma.         Signed by: Mera Nascimento MD

## 2021-06-09 NOTE — PROGRESS NOTES
Patient here, unaccompanied. Patient is legally blind.   Reviewed bone marrow biopsy and consent form, patient verifies that he signed form previously.   Patient says he has never taken opioids before before does want to take pre-meds as ordered, given at 10:12 AM.  Pathology lab informed.   Patient's friend Moi will pick patient up at end of visit.   Verbally covered post-procedure instructions, patient knows that friend Moi will also receive copy and agrees with plan. Aidee Adams RN

## 2021-06-09 NOTE — PROGRESS NOTES
Patient is here for follow-up of benign hematology. Had bone survey, labs.  Accompanied by friend, Moi.  Aidee Adams RN

## 2021-06-09 NOTE — PROGRESS NOTES
Patient is here for consultation of benign hematology.  Accompanied by friend, Moi.  Aidee Adams RN

## 2021-06-09 NOTE — PROGRESS NOTES
Kurtis Hoskins/Carmelita,    Could you please try to get through to Mr. Justice.  His potassium is low at 2.9.  I think this is because of the diuretic that he is taking.  He is supposed to be taking a potassium chloride supplement.  I doubt that he is taking it.  I tried calling him on his phone but the call does not get picked up.  Could you try to call him again a bit later and try to persuade him to take the potassium chloride tablets.    Thanks    Mera Nascimento MD

## 2021-06-09 NOTE — PROGRESS NOTES
Bone marrow bx completed at 11:45 am. Patient assessed at 12:15 pm.. VSS. Dressing on Rt iliac C/D/I.   Patient slightly unsteady on feet. Transported to front entrance via W/C, friend Moi to drive him home.   Gave written post-biopsy information to Moi, showed him area of bx site, informed of reasons to call us.   Moi will stay with patient tonight for safety.   Patient has scheduled f/u appt with Dr. Nascimento on 7/27/20.  Aidee Adams RN

## 2021-06-09 NOTE — CONSULTS
[facility] Hematology and Oncology Consult Note    Patient: Dread Justice  MRN: [id number]  Date of Service: 06/29/2020        Reason for Visit    I was consulted by Dr. Elder regarding nocturnal gammopathy of unknown significance.    Assessment/Plan    Mr. Dread Justice is a 85 y.o. gentleman with chronic kidney disease and chronic bilateral lower extremity edema who in the course of work-up for the chronic kidney disease was found to have a 0.7 g/dL monoclonal spike in the SPEP that was done on 4/29/2020.  The serum free light chains also showed a preponderance of the kappa light chain relative to the lambda light chain with a kappa to lambda ratio of 3.01.  His creatinine has been variable in the 1.4-2.5 range.  He has also been noted to have a normochromic normocytic anemia.  The last hemoglobin available to me is 9.7 from 3/4/2020.  The iron panel and ferritin from the same day appears to show iron deficiency.  He has numerous comorbidities.  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  Extensive degenerative arthritis, a stroke on 4/17/2005 from which he completely recovered, depression, iron deficiency anemia likely due to peptic ulcer disease, and an episode of pneumococcal meningitis for which she was admitted to the hospital on 11/28/2014 and recovered from.  He states that he is a recovered alcoholic for 40 years.  He still smokes about half pack per day.  He is very clear that he is not going to quit.    1.  Today I had an extensive discussion with the patient about what a monoclonal spike is and what her concerns are.  I explained to him that the concern is whether he has multiple myeloma which is a cancer from the bone marrow.  I explained to him what sort of endorgan damage it can cause.  I pointed out to him that he does have some kidney problems and also anemia.  If this is due to multiple myeloma it is eminently treatable.  We may be able to prolong his life by a couple of years  even.  We discussed about the sequence of work-up.  Initially he was reluctant to do the work-up but finally he decided that he is willing to do the preliminary work-up with blood work, the 24-hour urine studies and the x-rays.  He is not quite sure whether he will be willing to go for a bone marrow biopsy.    2.  We will obtain the following labs today: A peripheral blood morphology, CMP, LDH, SPEP, serum immune fixation, serum free light chains, quantitative immunoglobulins.    3.  For the normochromic normocytic anemia we will obtain the additional labs of her vitamin B12 level, serum folate level, TSH, ferritin and reticulocyte count.    4.  He was given a container and instructions about how to collect a 24-hour urine sample.  He will arrange for it to be brought in and then we will send it for testing.    5.  I have ordered an x-ray bone skeletal survey.  I am hoping that it can be done to date.    6.  I asked him to come back to see me in about 1 to 2 weeks.  By then the results of the above work-up should be available.  We will then sit down and make a decision whether to go for further work-up for myeloma depending on what we see.      ECOG Performance   ECOG Performance Status: 2  Distress Assessment           Problem List    1. MGUS (monoclonal gammopathy of unknown significance)  Immunoglobulins IgG, IgA, IgM    Electrophoresis, Protein, Serum    Immunofixation Electrophoresis, Serum    Kappa/Lambda Quantitative Free Light Chains and Ratio, Serum(FLCS)    Immunofixation Electrophoresis, Urine    Immunoglobulin Total Light Chains, Urine    Comprehensive Metabolic Panel    LD(LDH)    Morphology,Smear Review (MORP)    Vitamin B12    Folate, Serum    TSH    Reticulocytes    Ferritin    Electrophoresis, Protein, 24 hour Urine    XR Bone Survey Complete   2. CKD (chronic kidney disease) stage 3, GFR 30-59 ml/min (H)     3. Normochromic normocytic anemia  Morphology,Smear Review (MORP)    Vitamin B12    Folate,  Serum    TSH    Reticulocytes    Ferritin           CC: Greg Carroll MD      ______________________________________________________________________________      History    Mr. Dread Justice is an 85-year-old gentleman who is here for the consultation with his friend and neighbor Moi, who helps him more.    He was being evaluated by Dr. Elder for chronic kidney disease.  Since 2017 he has had some worsening of his creatinine.  He now has chronic kidney disease stage III.  In the course of evaluation he was found to have a monoclonal gammopathy of unknown significance.  He was found in the SPEP that was done on 3/4/2020 to have a monoclonal spike of 0.7 g/dL.  He was not found to have much proteinuria in the random urine sample.  Free kappa light chains were 47.3 mg and free lambda light chain 15.7 mg/L the kappa to lambda ratio was 3.01.  Thus he has been referred to hematology for work-up for the possibility of myeloma.    The patient himself states that he is not very enthusiastic about an aggressive work-up.  He feels that he is of advanced age.  His wife passed away 2 years ago.  He does not feel that he wants to live for a long time.  He feels that he has many comorbidities already.    He does have some fatigue.  He states that when he walks a distance he has no shortness of breath.  He has a history of COPD.  He has some tinnitus and some diminished hearing.  Some off-and-on sinus congestion.  He has some chronic leg edema.  In fact that is what led to the work-up for chronic kidney disease.  He has arthritis.  This causes joint pains, mainly in the left knee and to a lesser extent in the right knee.  His friend tells me that he is considered eligible for having a knee replacement surgery but he has decided not to have the surgery because of his age.    Overall pain rating is a 6/10.  He is satisfied with the current pain regimen.  He walks around using a cane.    He says that he has lost maybe 20  pounds over many years.  He does not think that he has lost that much weight in the last year.  Appetite is not great.    No fevers or night sweats.  No lumps or bumps anywhere.  No unusual headaches.  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  No swallowing difficulty.  No chest pain or palpitation.  Has an occasional cough but he brings up some clear phlegm.  No abdominal pain.  No complaints of constipation or diarrhea.  Has frequent urination because he is on a diuretic.  No blood in stool or black stools.  No blood in the urine.  No complaints of numbness or tingling.    Please see below.  A 14 point review of system is otherwise completely negative.    Past History  Past Medical History:   Diagnosis Date     Alcohol abuse      Cerebellar cerebrovascular accident without late effect 04/17/2005    Mild right-sided weakness.  MRI showed a 4.5 cm infarct in the left cerebellum.     Chronic headaches      COPD (chronic obstructive pulmonary disease) (H)      Depression      Iron deficiency anemia 10/03/2012     Macular degeneration, bilateral      Osteoarthritis      Peptic ulcer disease 1975    Secondary to alcohol use.     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  Diverticulosis, internal hemorrhoids.     ESOPHAGOGASTRODUODENOSCOPY  10/04/2012    Chronic gastritis.     INGUINAL HERNIA REPAIR Right 7/8/2014    Procedure: HERNIA REPAIR INGUINAL, RIGHT;  Surgeon: Mack Kowalski MD;  Location: [facility];  Service:      PICC  12/2/2014          TONSILLECTOMY  1949     Family History   Problem Relation Age of Onset     Heart failure Mother 93     Stroke Father 79     Heart attack Father      Cancer Sister 76     No Medical Problems Son         Not in contact.     No Medical Problems  Daughter         Not in contact.     Social History     Socioeconomic History     Marital status:      Spouse name: Not on file     Number of children: Not on file     Years of education: Not on file     Highest education level: Not on file   Occupational History     Not on file   Social Needs     Financial resource strain: Not on file     Food insecurity     Worry: Not on file     Inability: Not on file     Transportation needs     Medical: Not on file     Non-medical: Not on file   Tobacco Use     Smoking status: Current Every Day Smoker     Packs/day: 0.50     Types: Cigarettes     Smokeless tobacco: Never Used   Substance and Sexual Activity     Alcohol use: No     Drug use: No     Sexual activity: Not on file   Lifestyle     Physical activity     Days per week: Not on file     Minutes per session: Not on file     Stress: Not on file   Relationships     Social connections     Talks on phone: Not on file     Gets together: Not on file     Attends [employer] service: Not on file     Active member of club or organization: Not on file     Attends meetings of clubs or organizations: Not on file     Relationship status: Not on file     Intimate partner violence     Fear of current or ex partner: Not on file     Emotionally abused: Not on file     Physically abused: Not on file     Forced sexual activity: Not on file   Other Topics Concern     Not on file   Social History Narrative    Lives with family      Allergies    Allergies   Allergen Reactions     Ibuprofen Rash     Tolerated 15mg IV toradol on 5/18/19, no rash       Review of Systems    General  General (WDL): Exceptions to WDL  Fatigue: Yes - Recent (Less than 3 months)  Generalized Muscle Weakness: Yes - Recent (Less than 3 months)  ENT  ENT (WDL): Exceptions to WDL  Dentures: Yes - Chronic (Greater than 3 months)  Respiratory  Respiratory (WDL): Exceptions to WDL  Dyspnea: Yes - Recent (Less than 3 months)  Cough: Yes - Recent (Less than 3  "months)  Cardiovascular  Cardiovascular (WDL): Exceptions to WDL  Edema Limbs: Yes - Recent (Less than 3 months)  Endocrine  Endocrine (WDL): Exceptions to WDL  Heat Intolerance: Yes - Recent (Less than 3 months)  Excessive Urination: Yes - Recent (Less than 3 months)  Gastrointestinal  Gastrointestinal (WDL): All gastrointestinal elements are within defined limits  Musculoskeletal  Musculoskeletal (WDL): Exceptions to WDL  Joint pain: Yes - Chronic (Greater than 3 months)(chronic Llt knee)  Activity Assistance: Yes - Recent (Less than 3 months)  Pain interfering with walking: Yes - Chronic (Greater than 3 months)  Assistive device: Yes - Chronic (Greater than 3 months)(walker and cane)  Neurological  Neurological (WDL): Exceptions to WDL  Difficulty walking: Yes - Chronic (Greater than 3 months)  Difficulty with speech: Yes - Recent (Less than 3 months)  Dominant Hand: Right  Psychological/Emotional  Psychological/Emotional (WDL): Exceptions to WDL  Depression: Yes - Chronic (Greater than 3 months)  Hematological/Lymphatic  Hematological/Lymphatic (WDL): All hematological/lymphatic elements are within defined limits  Dermatological  Dermatologic (WDL): All dermatological elements are within defined limits  Genitourinary/Reproductive  Genitourinary/Reproductive (WDL): Exceptions to WDL  Urinary Frequency: Yes - Chronic (Greater than 3 months)  Urination more than 2 times a night: Yes - Chronic (Greater than 3 months)  Urinary Urgency: Yes - Chronic (Greater than 3 months)  Reproductive (Females only)     Pain  Currently in Pain: Yes  Pain Score (Initial OR Reassessment): 6  Pain Frequency: Other (Comment)(always present, varies in intensity)  Location: Lt knee  Pain Characteristics : Sharp  Pain Intervention(s): Home medication;Repositioned;Rest    Physical Exam    Recent Vitals 6/29/2020   Height 5' 5.5\"   Weight 188 lbs 13 oz   BSA (m2) 1.99 m2   /75   Pulse 66   Temp 97.9   Temp src 1   SpO2 95   Some " recent data might be hidden       GENERAL: Alert and oriented to time place and person. Seated comfortably in a wheelchair. In no distress.  Normal build.  Pleasant.    HEAD: Atraumatic and normocephalic.    EYES: HUSSEIN, EOMI.  No pallor.  No icterus.    Oral cavity: no mucosal lesion or tonsillar enlargement.    NECK: supple. JVP normal.  No thyroid enlargement.    LYMPH NODES: No palpable, cervical, axillary or inguinal lymphadenopathy.    CHEST: clear to auscultation bilaterally.  Symmetrical breath movements bilaterally.    CVS: S1 and S2 are heard. Regular rate and rhythm.  No murmur or gallop or rub heard.  Lateral lower leg edema.    ABDOMEN: Soft. Not tender. Not distended.  No palpable hepatomegaly or splenomegaly.  No other mass palpable.  Bowel sounds heard.    EXTREMITIES: Warm.    SKIN: no rash, or bruising or purpura.        Lab Results    Lab Requisition on 04/29/2020   Component Date Value Ref Range Status     Albumin % 04/29/2020 52.3  51.0 - 67.0 % Final     Albumin  04/29/2020 3.8  3.2 - 4.7 g/dL Final     Alpha 1 % 04/29/2020 4.0  2.0 - 4.0 % Final     Alpha 1 04/29/2020 0.3  0.1 - 0.3 g/dL Final     Alpha 2 % 04/29/2020 14.2* 5.0 - 13.0 % Final     Alpha 2 04/29/2020 1.0* 0.4 - 0.9 g/dL Final     % Beta 04/29/2020 11.4  10.0 - 17.0 % Final     Beta 04/29/2020 0.8  0.7 - 1.2 g/dL Final     Gamma Globulin % 04/29/2020 18.1  9.0 - 20.0 % Final     Gamma Globulin 04/29/2020 1.3  0.6 - 1.4 g/dL Final     ELP Comment 04/29/2020    Final                    Value:The alpha 2 globulin fraction is increased. Isolated increases in alpha 2 globulin are associated with a variety of chronic inflammatory conditions, particularly connective tissue inflammatory disorders, or collagen-vascular diseases.    Monoclonal peak detected in gamma region. Suggest immunofixation electrophoresis to further characterize.       Protein, Total 04/29/2020 7.2  6.0 - 8.0 g/dL Final     Monoclonal Peak 04/29/2020 0.7  g/dL Final      Path ICD: 04/29/2020 I12.9   Final     Interpreted By: 04/29/2020 Eliezer Arnold MD   Final     Sodium 04/29/2020 138  136 - 145 mmol/L Final     Potassium 04/29/2020 4.2  3.5 - 5.0 mmol/L Final     Chloride 04/29/2020 103  98 - 107 mmol/L Final     CO2 04/29/2020 21* 22 - 31 mmol/L Final     Anion Gap, Calculation 04/29/2020 14  5 - 18 mmol/L Final     Glucose 04/29/2020 83  70 - 125 mg/dL Final     Calcium 04/29/2020 8.9  8.5 - 10.5 mg/dL Final     BUN 04/29/2020 24  8 - 28 mg/dL Final     Creatinine 04/29/2020 1.45* 0.70 - 1.30 mg/dL Final     GFR MDRD Af Amer 04/29/2020 56* >60 mL/min/1.73m2 Final     GFR MDRD Non Af Amer 04/29/2020 46* >60 mL/min/1.73m2 Final     Additional lab results from the nephrology clinic reviewed.  Please see the scanned note from Dr. Elder.        Imaging Results    I reviewed the x-ray images of the left knee from 5/18/2019 which did not show any evidence of lytic bone lesions.  Extensive arthritis.      Signed by: Mera Nascimento MD

## 2021-06-10 NOTE — PROGRESS NOTES
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    Assessment and Plan      ECOG Performance   ECOG Performance Status: 2    Distress Assessment  Distress Assessment Score: 9: He was very glad to hear that he does not have multiple myeloma.    Pain  Pain Score (Initial OR Reassessment): 8: Continue with the current plan for pain management.    Mr. Dread Justice is a 85 y.o. gentleman with chronic kidney disease and chronic bilateral lower extremity edema, who in the course of work-up for the CKD was found to have a 0.7 g/dL monoclonal spike in the SPEP that was done on 4/29/2020.  The serum free light chains also showed a preponderance of the kappa light chain relative to the lambda light chain with a kappa to lambda ratio of 3.01.  His creatinine level has been variable in the 1.4 to that 2.5 range.  He has also been noted to have a normochromic normocytic anemia.  The previous iron panel and ferritin appeared to show iron deficiency.    He has numerous comorbidities.  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  He still smokes about half pack per day.  He is very clear that he is not willing to quit and does not want to discuss about that anymore.    In the work-up done for the monoclonal gammopathy of unknown significance: the x-ray bone skeletal survey, there was extensive degenerative disease but there was no lytic bony lesions seen.  He did have a normochromic normocytic anemia with a hemoglobin of 0.3 on  6/29/2020 which is an improvement over what he had earlier.  The WBC count and platelet count are normal.  In the peripheral blood smear there was no dysplastic or abnormal cells seen.  The reticulocyte count was not appropriately elevated.  The metabolic panel his creatinine had improved to 1.38.  He did not have hypercalcemia.  In the SPEP he was found to have an increased monoclonal colopathy at 1.1 g/dL which in the serum monofixation was an IgG kappa monoclonal globulin.  The quantity immunoglobulins showed that the IgG and IgA were normal but the IgM was slightly decreased.  Serum free light chains again showed an increased kappa related to lambda with a kappa to lambda ratio of 3.65 which is abnormal.  The 24-hour urine collection from 7/2/2020 showed a small monoclonal peak at 4 mg in a 24 hours.  Urine protein was only 138 mg in a 24 hours.  Urine immunofixation showed a clonal bands in the IgG and kappa migration lanes consistent with IgG kappa monoclonal gammopathy.  The urine light chains were both very low and below detection levels.    1.  With the above findings, we decided to do a bone marrow aspiration biopsy to completely rule out the possibility of multiple myeloma.  He is here to discuss the results.  I reviewed with him that the bone marrow biopsy showed only 5 to 10% kappa light chain restricted plasma cells.  This is not enough to make a diagnosis of multiple myeloma.  Also the FISH studies did not show any of the standard findings for myeloma.  The bone marrow biopsy did show that he has relatively low iron stores.    2.  I discussed with him that to make a diagnosis of multiple myeloma that has to be at least 10% abnormal appearing plasma cells in the bone marrow.  Thus the above bone marrow biopsy does not show multiple myeloma.  Given all the above findings, I do not think that he has multiple myeloma at this time.    I discussed with him that he need not start treatment with chemotherapy  for multiple myeloma at this time.  I discussed with him that patients who have a monoclonal gammopathy, have a small risk of progression into myeloma down the line.  This works out to approximately 1% risk per year.  If he wants we can go for an annual recheck of the blood tests.  If we find that there is a progressive rise of the monoclonal spike, then we can consider redoing a bone marrow biopsy and if he has multiple myeloma at that point consider treatment.  However given that he is 85 years of age and has multiple other medical problems which are unlikely to cause problems for him before multiple myeloma, I am not recommending such a follow-up strongly.  At this point the patient is not interested in an annual follow-up for the monoclonal gammopathy but he plans to think about it.  He may decide to follow-up with me in which case he will give the clinic a call.  I told him that if he decides to do so I would be glad to follow-up in a year with labs.    3.  I advised him to continue follow-up with nephrology for other chronic kidney disease.  At this point it does not appear to be due to multiple myeloma.    4.  Regarding the anemia: I have recommended to him that he is still somewhat low in iron stores.  He can try increasing the iron tablets to 2 times a day or 3 times a day.  He was willing to do that.  He should also continue with the vitamin B12 supplement and the folic acid supplement for the anemia.    5.  Follow-up: At this point he is not scheduled for continued follow-up with hematology.  He plans to follow-up with his PCP.  If he changes his mind, he will give the [facility] cancer clinic a call.  He does have the contact information.    Time spend >25 minutes face to face with the patient. More than 50 % in counseling and coordination of care.        Problem List    1. MGUS (monoclonal gammopathy of unknown significance)     2. CKD (chronic kidney disease) stage 3, GFR 30-59 ml/min (H)     3.  Normochromic normocytic anemia          CC: Greg Carroll MD Alec Otteman, MD    ______________________________________________________________________________    History of Present Illness    Mr. Dread Justice is here for follow-up with his friend.    He admits that he feels very anxious about the results of the bone marrow biopsy.  The biopsy procedure itself went well.  However after he reached home apparently he had a small fall and suffered some abrasions on his left knee.  That is healing well.    At this point the only pain that he has is the multiple joint pains.  His activity level still remains quite limited because of his multiple problems and also because of limited visual activity.    Please see below.  A 14 point review of system is otherwise completely negative.      Pain Status  Currently in Pain: Yes    Review of Systems    Constitutional  Constitutional (WDL): Exceptions to WDL  Fatigue: Fatigue not relieved by rest - Limiting instrumental ADL  Fever: None  Chills: None  Weight Gain: None  Weight Loss: to <10% from baseline, intervention not indicated(10 lb)  Neurosensory  Neurosensory (WDL): Exceptions to WDL  Ataxia: Moderate symptoms, limiting instrumental ADL  Peripheral Sensory Neuropathy: None  Confusion: None  Syncope: None  Cardiovascular  Cardiovascular (WDL): Exceptions to WDL  Palpitations: None  Edema: Yes  Edema Limbs: 5 - 10% inter-limb discrepancy in volume or circumference at point of greatest visible difference, swelling or obscuration of anatomic architecture on close inspection  Phlebitis: None  Superficial thrombophlebitis: None  Pulmonary  Respiratory (WDL): Exceptions to WDL  Cough: Mild symptoms, nonprescription intervention indicated  Dyspnea: Shortness of breath with minimal exertion, limiting instrumental ADL  Hypoxia: None  Gastrointestinal  Gastrointestinal (WDL): Exceptions to WDL  Anorexia: Loss of appetite without alteration in eating habits  Constipation:  Occasional or intermittent symptoms, occasional use of stool softeners, laxatives, dietary modification, or enema  Diarrhea: None  Dysphagia: None  Esophagitis: None  Nausea: None  Pharyngitis: None  Vomiting: None  Dysgeusia: None  Dry Mouth: None  Genitourinary  Genitourinary (WDL): Exceptions to WDL(diuretics)  Urinary Frequency: Present  Urinary Retention: None  Urinary Tract Pain: None  Integumentary  Integumentary (WDL): All integumentary elements are within defined limits(Rt knee scab)  Patient Coping     Distress Assessment  Distress Assessment Score: 9  Accompanied by       Past History  Past Medical History:   Diagnosis Date     Alcohol abuse      Cerebellar cerebrovascular accident without late effect 04/17/2005    Mild right-sided weakness.  MRI showed a 4.5 cm infarct in the left cerebellum.     Chronic headaches      COPD (chronic obstructive pulmonary disease) (H)      Depression      Iron deficiency anemia 10/03/2012     Macular degeneration, bilateral      Osteoarthritis      Peptic ulcer disease 1975    Secondary to alcohol use.     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  Diverticulosis, internal hemorrhoids.     ESOPHAGOGASTRODUODENOSCOPY  10/04/2012    Chronic gastritis.     INGUINAL HERNIA REPAIR Right 7/8/2014    Procedure: HERNIA REPAIR INGUINAL, RIGHT;  Surgeon: Mack Kowalski MD;  Location: [facility];  Service:      PICC  12/2/2014          TONSILLECTOMY  1949       Physical Exam    Recent Vitals 7/27/2020   Height -   Weight 178 lbs 8 oz   BSA (m2) 1.93 m2   /67   Pulse 73   Temp 98.3   Temp src 1   SpO2 94   Some recent data might be hidden       GENERAL: Alert and oriented to time place and person. Seated comfortably in a wheelchair. In no distress.  Normal  build.    HEAD: Atraumatic and normocephalic.    EYES: HUSSEIN, EOMI.  No pallor.  No icterus.    Oral cavity: no mucosal lesion or tonsillar enlargement.    NECK: supple. JVP normal.  No thyroid enlargement.    LYMPH NODES: No palpable, cervical, axillary or inguinal lymphadenopathy.    ABDOMEN: Soft. Not tender. Not distended.  No palpable hepatomegaly or splenomegaly.      EXTREMITIES: Warm.    SKIN: no rash, or bruising or purpura.  Male pattern baldness.        Lab Results    Procedure visit on 07/14/2020   Component Date Value Ref Range Status     WBC 07/14/2020 6.1  4.0 - 11.0 thou/uL Final     RBC 07/14/2020 3.37* 4.40 - 6.20 mill/uL Final     Hemoglobin 07/14/2020 10.4* 14.0 - 18.0 g/dL Final     Hematocrit 07/14/2020 31.4* 40.0 - 54.0 % Final     MCV 07/14/2020 93  80 - 100 fL Final     MCH 07/14/2020 30.9  27.0 - 34.0 pg Final     MCHC 07/14/2020 33.1  32.0 - 36.0 g/dL Final     RDW 07/14/2020 16.0* 11.0 - 14.5 % Final     Platelets 07/14/2020 331  140 - 440 thou/uL Final     MPV 07/14/2020 9.9  8.5 - 12.5 fL Final     Neutrophils % 07/14/2020 66  50 - 70 % Final     Lymphocytes % 07/14/2020 17* 20 - 40 % Final     Monocytes % 07/14/2020 12* 2 - 10 % Final     Eosinophils % 07/14/2020 4  0 - 6 % Final     Basophils % 07/14/2020 1  0 - 2 % Final     Neutrophils Absolute 07/14/2020 4.0  2.0 - 7.7 thou/uL Final     Lymphocytes Absolute 07/14/2020 1.1  0.8 - 4.4 thou/uL Final     Monocytes Absolute 07/14/2020 0.7  0.0 - 0.9 thou/uL Final     Eosinophils Absolute 07/14/2020 0.3  0.0 - 0.4 thou/uL Final     Basophils Absolute 07/14/2020 0.0  0.0 - 0.2 thou/uL Final     Case Report 07/14/2020    Incomplete                    Value:Bone Marrow                                       Case: [id number]                                   Authorizing Provider:  Mera Nascimento MD           Collected:           07/14/2020 1100              Ordering Location:     [facility] and Received:            07/14/2020 1209                                      Hematology                                                                   Pathologist:           James Hernández MD                                                        Specimens:   A) - Iliac Crest, Right                                                                             B) - Iliac Crest, Right                                                                             C) - Iliac Crest, Right                                                                             D) - Peripheral Blood                                                                       Final Diagnosis 07/14/2020    Incomplete                    Value:BONE MARROW, POSTERIOR ILIAC CREST, ASPIRATE, CLOT SECTION, AND BIOPSY:     -   NORMOCELLULAR MARROW WITH 5-10% KAPPA LIGHT CHAIN RESTRICTED PLASMA CELL            DYSCRASIA     -   IRON STORES DIFFICULT TO EVALUATE, FAVOR DECREASED    PERIPHERAL BLOOD:     -   NORMOCHROMIC NORMOCYTIC ANEMIA     MCSS     Comment 07/14/2020    Incomplete                    Value:The clinical history has been reviewed.  Morphology, immunohistochemistry, in situ hybridization studies and flow cytometry ([id number]) demonstrate approximately 5-10% involvement by kappa light chain restricted plasma cell dyscrasia. Pending cytogenetic/FISH studies.     Clinical Information 07/14/2020    Incomplete                    Value:Monoclonal gammopathy, CKD, anemia.  Suspect myeloma.     Performed by: 07/14/2020    Incomplete                    Value:James Hernández MD     Peripheral Smear 07/14/2020    Incomplete                    Value:Red blood cells are decreased in number but overall normochromic and normocytic. Anisopoikilocytosis and polychromasia are slightly increased but rouleaux formation does not appear prominent.    The white blood cell count and differential appear as reported on the CBC. Leukocytes are normal in number and appearance, consisting predominantly of  segmented and band neutrophils with fewer numbers of lymphocytes and monocytes. No blasts or dysplastic changes are identified.    Platelets are normal in number and appearance.     Bone Marrow Differential 07/14/2020    Incomplete                    Value:Neutrophils and precursors 45.5%; Erythroid cells 15.5%; Lymphocytes 31%; Monocytes 1.5%; Eosinophils 0.5%; Basophils 0%; Plasma cells 0%; Blasts 3.5%.    M:E Ratio: 3:1     Aspirate Smear 07/14/2020    Incomplete                    Value:Aspirate smears are cellular and demonstrate numerous marrow particles. Myeloid and erythroid cells are present and demonstrate full spectrum maturation. Blasts are not increased. Megakaryocytes are normal in number and appearance. An iron stain is difficult to evaluate; iron stores appear decreased.     Core Biopsy/Aspirate Clot 07/14/2020    Incomplete                    Value:Bone marrow biopsy and clot sections are normocellular at 10-20%. Myeloid and erythroid cells are present and demonstrate full spectrum maturation. Blasts are not increased. Megakaryocytes are normal in number and appearance. An iron stain demonstrates no significant iron stores with no increase in ring sideroblasts.     demonstrates approximately 10% plasma cells; kappa and lambda in situ hybridization studies demonstrate kappa light chain restriction. No significant CD20(+) B-cell population. Scattered CD3(+) T-cells are noted.     All controls stain appropriately.     Special Stains 07/14/2020    Incomplete                    Value:ELLIE  FDA Disclaimer:    The In-Situ Hybridization test/s was/were developed and the performance characteristics determined by [employer]. It has not been cleared or approved by the [place] Food and Drug Administration.    The Food and Drug Administration has determined that clearance or approval is not necessary, because this test is used for clinical purposes. This test should not be regarded as investigational  or research.    [employer] is certified for the performance of high-complexity clinical testing under the Clinical Laboratory Improvement Amendments of 1988 (CLIA), and in keeping with the certification requirements, [employer] has verified this test's accuracy and precision or validity of the method. Additional information is available upon request.     Charges 07/14/2020    Incomplete                    Value:When performed, bone marrow core biopsies are decalcified prior to tissue processing.    CPT: 22845, 07447, 22770, 04475 ×2, 16060 ×3, 31716, 95725 ×2, 19330, 23861, 12161   ICD-10: D47.2     Result Flag 07/14/2020 Malignant* Normal Incomplete     Scan Result 07/14/2020 See Scanned Report   Final     Case Report 07/14/2020    Final                    Value:Flow Cytometry Report                             Case: [id number]                                    Authorizing Provider:  Mera Nascimento MD           Collected:           07/14/2020                   Ordering Location:     [facility] and Received:            07/14/2020 1324                                     Hematology                                                                   Pathologist:           James Hernández MD                                                        Specimen:    Bone Marrow                                                                                 Diagnosis 07/14/2020    Final                    Value:BONE MARROW, POSTERIOR ILIAC CREST, ASPIRATE WITH FLOW CYTOMETRY IMMUNOPHENOTYPIC CHARACTERIZATION:    - HETEROGENEOUS T, B, AND NK-CELLS WITHOUT EVIDENCE OF A MONOCLONAL B-CELL POPULATION      OR ABERRANT T-CELL MARKER EXPRESSION     - NO INCREASE IN CD34(+) BLASTS (4%)    - 1% /CD38/CD56(+) PLASMA CELLS     Phenotypic Data 07/14/2020    Final                    Value:  Low Probability: Bone Marrow     Phenotyping Report    Phenotyping Description of Cells    Source: Bone  Marrow  [id number]    Antibody  %    Dual   %  B-Related     Labeling     CD19  19      1  CD20  22       5  CD22   15    ZW61-oky.   11  CD10   NA    FZ76-rly.  9  CD11c  10    FB90-hgf.   12        GY38-pas.   11        Rei./[place].* 1.2:1             Antibody %     Dual   %  T-Related     Labeling     CD5  71    CD3-4   38  CD3   72    CD3-8   34  CD4   40    CD4-8   NA  CD8  37    CD4/CD8*  1.1:1   CD16  17      6  CD56   17/39 All    CD16-56  12        CD56-38   15/5 All         Antibody  %    Dual  %  Myeloid/Monocytic/Misc.   Labeling    HLA-DR  28    XV75-191   1 All  CD45   100    JB71-864   1 All  CD34  4 All    BK29-433   1 All  CD13  1  CD64  4  CD38   63/39 All    1 All    3 All     Cell count is approximately: 23.1 x 10*3/ul  Viability is: NA    *=ratio  NA=antibody not run  rei.=kappa  [place].=lambda     Charges 07/14/2020    Final                    Value:CPT:  64389 (20 markers)   ICD-10:  D47.2     Result Flag 07/14/2020    Final     Miscellanous Test Dept. 07/14/2020 Chemistry Reference Test   Final     Test Name 07/14/2020 FISH Myeloma panel, Bone Marrow    Final     Performing Lab 07/14/2020 [address] [address]     Final     Scan Result 07/14/2020 See Scanned Report   Final           Imaging    Xr Bone Survey Complete    Result Date: 6/29/2020  EXAM: XR BONE SURVEY COMPLETE LOCATION: [facility] DATE/TIME: 6/29/2020 2:52 PM INDICATION: Monoclonal gammopathy. COMPARISON: CT of thoracic and lumbar spine 11/28/2014.     Degenerative changes are present within the spine. No lytic lesions identified to suggest multiple myeloma.         Signed by: Mera Nascimento MD

## 2021-06-16 PROBLEM — N18.30 CKD (CHRONIC KIDNEY DISEASE) STAGE 3, GFR 30-59 ML/MIN (H): Status: ACTIVE | Noted: 2020-06-29

## 2021-06-16 PROBLEM — D47.2 MGUS (MONOCLONAL GAMMOPATHY OF UNKNOWN SIGNIFICANCE): Status: ACTIVE | Noted: 2020-06-29

## 2021-06-16 PROBLEM — D64.9 NORMOCHROMIC NORMOCYTIC ANEMIA: Status: ACTIVE | Noted: 2020-06-29

## 2021-06-20 NOTE — LETTER
Letter by Mera Nascimento MD at      Author: Mera Nascimento MD Service: -- Author Type: --    Filed:  Encounter Date: 5/28/2020 Status: (Other)       Dear Dread Justice    Thank you for choosing [facility] for your care.  We are committed to providing you with the highest quality and compassionate healthcare services.  The following information pertains to your first appointment with our clinic.    Date/Time of appointment:  6/29/2020 12:45pm      Note: Please arrive 30 minutes prior to your appointment time.  This allows time to complete forms, possible labs and nursing assessment.    Name of your Physician:  Mera Nascimento MD    What to bring to your appointment:    Completed Patient History/Initial Nursing Assessment and Medication/Allergy List (these forms were sent to you).    Any paperwork or films from your physician that we have asked you to bring.    Your current insurance card(s).    Parking:    Please refer to the map included to direct you to [facility].    You can park in any visitor/patient parking area you choose. There is no charge for parking at [facility].     Enter the hospital at the front/main entrance.      Please check in with our  representatives who will escort you to the clinic located in [address] of the [facility].    We hope these instructions are helpful to you.  If you have any questions or concerns, please call us at [phone].  It is our pleasure to assist you.    Warm Regards,    [phone]

## 2021-07-10 PROBLEM — M17.12 OSTEOARTHRITIS OF LEFT KNEE: Status: ACTIVE | Noted: 2021-01-01

## 2021-07-11 PROBLEM — M17.12 LOCALIZED OSTEOARTHRITIS OF LEFT KNEE: Status: ACTIVE | Noted: 2021-01-01

## 2021-07-11 NOTE — PROGRESS NOTES
Care Management Follow Up    Length of Stay (days): 1    Expected Discharge Date:  7/12?     Concerns to be Addressed:   pending insurance prior authorization    Patient plan of care discussed at interdisciplinary rounds: Yes    Anticipated Discharge Disposition: Skilled Nursing Facilty, Transitional Care     Anticipated Discharge Services:    Anticipated Discharge DME:      Patient/family educated on Medicare website which has current facility and service quality ratings:    Education Provided on the Discharge Plan:    Patient/Family in Agreement with the Plan:      Referrals Placed by CM/CARLTON:    Private pay costs discussed:    Additional Information:  CM met with patient to update him that his insurance prior authorization is still pending at this time. Patient voiced understanding for the need for a prior authorization to ensure coverage prior to being able to discharge to a transitional care unit. He denied any additional questions & voiced agreement with plan to go to [facility] once authorization is received.       CARRIE Chavez

## 2021-07-11 NOTE — PLAN OF CARE
Patient vital signs are at baseline: Yes  Patient able to ambulate as they were prior to admission or with assist devices provided by therapies during their stay:  Yes  Patient MUST void prior to discharge:  Yes  Patient able to tolerate oral intake:  Yes  Pain has adequate pain control using Oral analgesics:  Yes    Problem: Adult Inpatient Plan of Care  Goal: Optimal Comfort and Wellbeing  Outcome: Improving     Patient's left knee pain managed with PRN Tylenol and scheduled Hydroxyzine. Dressing changed, new ABD and ace wrap applied. Incision approximated with blisters on left side. Small amount of yellow drainage from blister. Patient continues to have confusion. Intermittent incontinence d/t confusion. Chair and bed alarms used for safety. Ambulating with assistance of 1 and walker. VSS. Awaiting TCU placement.

## 2021-07-11 NOTE — PLAN OF CARE
Pt demonstrates disorientation to time.  He does not call for assistance, setting off bed alarm.  Toileting whenever staff is in the room is helpful.  Pt has been incontinent, feeling the urge to go but is unable to void once to the toilet after his incontinent episode.  Incontinent cleanser applied.  Scheduled pain medications given.  Pt needs many ques with transfers and ADLs.  He is pleasant, cooperative and conversational.      Problem: Adult Inpatient Plan of Care  Goal: Plan of Care Review  Outcome: Improving  Flowsheets (Taken 7/11/2021 1855)  Plan of Care Reviewed With: patient  Progress: improving     Problem: Adult Inpatient Plan of Care  Goal: Absence of Hospital-Acquired Illness or Injury  Intervention: Prevent Skin Injury  Recent Flowsheet Documentation  Taken 7/11/2021 8395 by Brittnee Ayoub RN  Body Position: legs elevated     Problem: Adult Inpatient Plan of Care  Goal: Readiness for Transition of Care  Outcome: Improving     Problem: Adjustment to Decreased Mobility and [place] ([facility])  Goal: Optimal Coping  Outcome: Improving     Problem: Risk for Delirium  Goal: Improved Behavioral Control  Outcome: Improving     Problem: Risk for Delirium  Goal: Improved Attention and Thought Clarity  Outcome: Improving

## 2021-07-11 NOTE — PROGRESS NOTES
"Ortho Progress Note      Post-operative Day:  2  S/P L TKA by Dr Frazier      Subjective:  Pain: mild to moderat  Chest pain, SOB:  None  Nausea, vomiting:  No  Lightheadedness, dizziness:  No  Neuro:  Patient denies new onset numbness or paresthesias    Objective:  /61 (BP Location: Left arm)   Pulse 78   Temp 98.9  F (37.2  C) (Oral)   Resp 16   Ht 1.676 m (5' 6\")   Wt 78.5 kg (173 lb 1 oz)   SpO2 99%   BMI 27.93 kg/m    Gen: A&O x 3. NAD. Appears comfortable seated in chair next to bed.  Wound status: Incision is well-approximated with dermabond. Intact. There are multiple fracture blisters, one is draining serous fluid.  Circulation, motion and sensation: Dorsiflexion/plantarflexion intact and equal bilaterally; distal lower extremity sensation is intact and equal bilaterally.  Swelling: moderate   Calf tenderness: calves are soft and non-tender bilaterally.    Pertinent Labs   Lab Results: personally reviewed.   No results found for: INR, PROTIME  Lab Results   Component Value Date    WBC 6.1 07/14/2020    HGB 10.4 (L) 07/14/2020    HCT 31.4 (L) 07/14/2020    MCV 93 07/14/2020     07/14/2020     Lab Results   Component Value Date     04/07/2021    CO2 18 (L) 04/07/2021       Assessment: 2 days s/p L TKA    Plan:   Continue PT/OT  Weightbearing status:WBAT  Anticoagulation: ASA 81mg BID (resume when appropriate) in addition to SCDs, zainab stockings and early ambulation.  Discharge planning: TCU pending placement. Cleared for discharge from ortho standpoint.    Report completed by:  Marina Saucedo PA-C  [facility]  Date: 7/11/2021  Time: 8:33 AM    "

## 2021-07-11 NOTE — PROGRESS NOTES
[facility] MEDICINE PROGRESS NOTE      POD # 2     Assessment and plan:    S/p left TKA under peripheral nerve block and spinal 7/7/2021  Post-op vital signs: VS stable except for 3 li NC--> weaned off the oxygen.  -Medical history/pre-op/office notes reviewed as available  -Postopcare per ortho  -DVT prophylaxis as ordered--> hold aspirin because of possible melena  -zainab hose ordered for now  -Pain management with caution; pain management consult if indicated  -PT and OT for discharge planning    Acute blood loss anemia  Peptic ulcer diease decades ago  -hgb down to 8.2--> 7.9 today; not severe enough to need transfusion; patient is happy that he is not needing blood transfusion.  --home meds show no use of antiplatelet meds or AC  -3 episodes of black stool but stool for OB test was negative.  -PPI--> protonix iv  -had history of GIB in the past   --Briefly held aspirin--> resume today as benefits outweigh the risk.      Acute encephalopathy, multifactorial, significantly improved  -Today definitely more interactive compared to yesterday when he was in delirium.  Cooperative and answering my questions.  He is definitely more coherent today.  -may be medication related; use narcotics sparingly  -per caregiver this is his usual in the morning;  -suspect underlying baseline dementia  -Has participated with physical therapy and walked down the hallway.  -Patient says pain is overall fairly controlled.  -Occupational therapy recommends TCU; still waiting for placement approval.     History of alcohol abuse  -No withdrawal  -last drink was 43 years ago     Mood disorder  -resume pta escitalopram     Macular degeneration, legally blind  -supportive treatment  -lives alone  -needs to  Go to TCU for this reason.     CKD, overall stable  -Creatinine overall trending downward and stable.     Chronic smoking and COPD  Post op hypoxia, resolved  -follow and treat accordingly; wean off as  able  -smokes minimally  -not on oxygen at home; not on inhalers in pta home med list     Chronic diuretic treatment for chronic edema  -reason? - not CHF he says  -also on potassium supplement  -recheck BMP  -Resume torsemide.  Patient is eating fairly well.    Diet: Advance Diet as Tolerated: Regular Diet Adult  DVT Prophylaxis: Aspirin twice daily.  Benefits outweigh the risk  Code Status: Full Code    Anticipated possible discharge: Soon as TCU is available    Interval History/Subjective:  Patient is reported to be stable.  Mental status has been stable and no confusion noted the past 24 hours..  Eating adequately and denies any major pain.  No chest pain or shortness of breath per report.  No abdominal pain.  No recurrence of the melena.  Stool was occult blood test negative.  Patient is frustrated about his weight for the TCU.  Denies any other acute issues at this time.    Physical Exam/Objective:  Temp:  [98.9  F (37.2  C)] 98.9  F (37.2  C)  Pulse:  [78] 78  Resp:  [16] 16  BP: (130)/(61) 130/61  SpO2:  [99 %] 99 %    Body mass index is 27.93 kg/m .  Vital signs overall stable.  No fever.  GENERAL:  Alert, appears comfortable, in no acute distress,   HEAD:  Normocephalic, without obvious abnormality, atraumatic   EYES:  Grossly normal; pupils unremarkable   NOSE: Grossly normal   THROAT: Lips, mucosa, and tongue   unremarkable;  mouth moist   NECK: No mass noted; no stiffness   BACK:      LUNGS:   Auscultation: Negative for wheezing or crackles, symmetric chest rise on inhalation, respirations unlabored    CHEST WALL:  No tenderness or deformity   HEART:  Regular rate and rhythm, significant murmurs: Negative   ABDOMEN:   Soft, non-tender, bowel sounds normal ; no masses, no peritoneal signs   EXTREMITIES: Extremities normal, atraumatic, no cyanosis or edema    SKIN: Breaks in the skin: Defer examination of the surgical site of the need to orthopedic surgery.   erythema and warmth: Defer examination of the  skin at the surgical site to orthopedic surgery.     NEURO: Alert, oriented x3, no signs of acute stroke or change from prior state   PSYCH: Cooperative, behavior is appropriate, patient is in good spirits.     Medications:   Personally Reviewed.    [Held by provider] aspirin  81 mg Oral BID     cyanocobalamin  1,000 mcg Oral Daily     docusate sodium  100 mg Oral BID     escitalopram  20 mg Oral Daily     ferrous sulfate  325 mg Oral BID     hydrOXYzine  25 mg Oral 4x Daily     nicotine  1 patch Transdermal Daily     nicotine   Transdermal Q8H     [Held by provider] pantoprazole  40 mg Oral QAM AC     pantoprazole (PROTONIX) IV  40 mg Intravenous Q24H     polyethylene glycol  17 g Oral Daily     potassium chloride ER  10 mEq Oral BID     senna-docusate  1 tablet Oral BID     sodium chloride (PF)  3 mL Intracatheter Q8H     torsemide  20 mg Oral BID     Current Facility-Administered Medications   Medication Dose Route Frequency     acetaminophen  650 mg Oral Q4H PRN     sore throat lozenge  1 lozenge Buccal Q1H PRN     bisacodyl  10 mg Rectal Daily PRN     calcium carbonate  500 mg Oral 4x Daily PRN     HYDROmorphone  0.2 mg Intravenous Q2H PRN    Or     HYDROmorphone  0.4 mg Intravenous Q2H PRN     lidocaine 4%   Topical Q1H PRN     lidocaine (PF)  0.1-1 mL Other Q1H PRN     methocarbamol  500 mg Oral Q6H PRN     naloxone  0.2 mg Intravenous Q2 Min PRN    Or     naloxone  0.4 mg Intravenous Q2 Min PRN    Or     naloxone  0.2 mg Intramuscular Q2 Min PRN    Or     naloxone  0.4 mg Intramuscular Q2 Min PRN     ondansetron  4 mg Oral Q6H PRN    Or     ondansetron  4 mg Intravenous Q6H PRN     oxyCODONE  5 mg Oral Q4H PRN    Or     oxyCODONE  10 mg Oral Q4H PRN     prochlorperazine  5 mg Intravenous Q6H PRN     prochlorperazine  5 mg Oral Q6H PRN     sodium chloride (PF)  3 mL Intracatheter q1 min prn     sodium chloride (PF)  3 mL Intracatheter PRN       Cumulative essential/pertinent data reviewed:  Labs:  Hemoglobin  7.9--> hemoglobin ordered again for tomorrow.  So far no significant decline.  WBC and platelets are normal  Magnesium normal  Fecal occult blood test pending     Imaging:  Defer review to orthopedics    EKG: --      Kulwant Cadet MD  [facility]  [facility]  Phone: #[phone]

## 2021-07-11 NOTE — PLAN OF CARE
Problem: Physical Therapy    Dates: Start: 07/07/21     Disciplines: PT, OT, SLP   Goal: PT Goals    Dates: Start: 07/07/21   Expected End: 07/13/21     Description: Patient will demonstrate the following by 7/13/21, in order to maximize independence with functional mobility to facilitate safe discharge:  -Sit<>stand with FWW assistive device, SBA. NOT MET, continue  -Ambulate 100 feet with FWW assistive device, SBA. NOT MET, continue  -Supervision with Home Exercise Program for TKA exercise with handout. NOT MET, continue    Goals reviewed on 7/10/21 by Nivia Mcguire, PT, DPT      Patient will demonstrate the following by 7/10/21, in order to maximize independence with functional mobility to facilitate safe discharge:  -Sit<>stand with FWW assistive device, SBA  -Ambulate 100 feet with FWW assistive device, SBA  -Supervision with Home Exercise Program for TKA exercise with handout    Goals entered on 7/7/2021 by Ming Millan, PT

## 2021-07-12 NOTE — PLAN OF CARE
Problem: Risk for Delirium  Goal: Improved Behavioral Control  Outcome: Improving   Pt continues to experience delirium and needing continued alarm surveillance.  Alert only to self.  Patient vital signs are at baseline: Yes  Patient able to ambulate as they were prior to admission or with assist devices provided by therapies during their stay:  No,  Reason:  needing cues to use walker, unsteady, assist of one with gait belt.  Patient MUST void prior to discharge:  Yes  Patient able to tolerate oral intake:  Yes  Pain has adequate pain control using Oral analgesics:  Yes    Plan: discharge to TCU when approval is authorized.

## 2021-07-12 NOTE — PROGRESS NOTES
Care Management Follow Up    Length of Stay (days): 2    Expected Discharge Date: 07/13/2021     Concerns to be Addressed:       Patient plan of care discussed at interdisciplinary rounds: Yes    Anticipated Discharge Disposition: Skilled Nursing Facilty- [facility] pending insurance prior authorization      Anticipated Discharge Services: TCU  Anticipated Discharge DME: None    Patient/family educated on Medicare website which has current facility and service quality ratings: yes (patient and friend Moi)  Education Provided on the Discharge Plan:    Patient/Family in Agreement with the Plan: yes (patient and friend Moi)    Referrals Placed by CM/SW:  TCU referrals, accepted at [facility] pending insurance prior authorization  Private pay costs discussed: Not applicable    Additional Information:  Chart reviewed. CM spoke to Imelda in admissions [facility] and she confirms insurance prior authorization is still pending.     2:19 PM  CM left secure VM with [facility] requesting return call to CM for update on if insurance prior authorization was received.     3:30 PM  Per Loni in admissions, no prior authorization has been received at this time.   CM updated bedside RN and Charge RN.   CM updated patient and friend Moi ([phone]). Friend Moi can be called when prior authorization is received and he will transport from hospital to TCU.       Tracey Hoang, RN

## 2021-07-12 NOTE — PROGRESS NOTES
"Orthopedic Progress Note      Assessment:    5 days S/P left TKA    Plan:   - Continue PT/OT  - Weightbearing status: WBAT  - Anticoagulation: ASA 81 PO BID in addition to SCDs, zainab stockings and early ambulation.  - Discharge planning: TCU pending placement. Okay to discharge from ortho standpoint.      Subjective:  Pain: Moderate  Nausea, Vomiting:  No  Lightheadedness, Dizziness:  No  Neuro:  Patient denies new onset numbness or paresthesias    Patient is doing okay today. States he has pain in his left knee. No other complaints.     Objective:  /61 (BP Location: Right arm)   Pulse 70   Temp 99.5  F (37.5  C) (Oral)   Resp 18   Ht 1.676 m (5' 6\")   Wt 78.5 kg (173 lb 1 oz)   SpO2 96%   BMI 27.93 kg/m    The patient is oriented to self only. Appears comfortable.   Sensation is intact.  Dorsiflexion and plantar flexion is intact.  Dorsalis pedis pulse intact.  Fracture blisters present  Calves are soft and non-tender. Negative Denise's.  The incision is covered. Dressing C/D/I.    Pertinent Labs   Lab Results: personally reviewed.   No results found for: INR, PROTIME  Lab Results   Component Value Date    WBC 7.9 07/11/2021    HGB 7.2 (L) 07/12/2021    HCT 24.7 (L) 07/11/2021     07/11/2021     07/11/2021     Lab Results   Component Value Date     04/07/2021    CO2 18 (L) 04/07/2021         Report completed by:  Aidee Contreras PA-C, SH[facility]  Date: 7/12/2021  Time: 11:09 AM    "

## 2021-07-12 NOTE — PLAN OF CARE
Patient vital signs are at baseline: Yes; low grade temp resolved.  Patient able to ambulate as they were prior to admission or with assist devices provided by therapies during their stay:  Yes  Patient MUST void prior to discharge:  Yes; frequency and urgency with incontinence.  Patient able to tolerate oral intake:  Yes  Pain has adequate pain control using Oral analgesics:  Yes    VSS on RA. Alert to self, fluctuating orientation to situation, place, and time. Bed alarm on for safety. UA obtained per MD order d/t increased confusion overnight, along with low grade temp and urinary symptoms. 3+ edema to LLE. Motor strength and sensation intact per patient report. Denies pain. Ice therapy, essential oils, and repositioning provided for comfort. Plans to discharge to TCU pending insurance authorization. Will continue to monitor and follow plan of care.     Problem: Risk for Delirium  Goal: Optimal Coping  7/12/2021 0342 by Jovita Rodriguez RN  Outcome: Declining

## 2021-07-12 NOTE — PROGRESS NOTES
[facility] MEDICINE PROGRESS NOTE        Assessment and plan:    S/p left TKA under peripheral nerve block and spinal 7/7/2021  POD#3  Pain and bowel management   DVT prophylaxis per ortho  Encourage PT/OT  Encourage incentive spirometry       Acute blood loss anemia  Hb down to 8.2--> 7.9-->7.2 today  He has history of GIB  Stool negative for occult blood  Discontinue Protonix IV        Acute encephalpathy, multifactorial, significantly improved  Likely with underlying dementia      History of alcohol abuse  No withdrawal  last drink was 43 years ago     Mood disorder  Resume pta escitalopram     Macular degeneration, legally blind  Supportive treatment  Lives alone, going to TCU  -needs to  Go to TCU for this reason.     CKD, overall stable     Chronic smoking and COPD  Post op hypoxia, resolved  Stable      Chronic diuretic treatment for chronic edema  On torsemide, resume     Diet: Advance Diet as Tolerated: Regular Diet Adult  Advance Diet as Tolerated  DVT Prophylaxis: Aspirin twice daily  Code Status: Full Code    Anticipated possible discharge: per ortho    Interval History/Subjective:  Feeling well, reports frequent BM   Hard of hearing, according to the family at bedside, he is confused in compare with his baseline     Physical Exam/Objective:  Temp:  [97.5  F (36.4  C)-99.8  F (37.7  C)] 99.5  F (37.5  C)  Pulse:  [65-75] 70  Resp:  [16-18] 18  BP: (108-128)/(53-61) 128/61  SpO2:  [93 %-96 %] 96 %    Body mass index is 27.93 kg/m .  Vital signs overall stable.  No fever.  GENERAL:  Alert, appears comfortable, in no acute distress,   HEAD:  Normocephalic, without obvious abnormality, atraumatic   EYES:  Grossly normal; pupils unremarkable   NOSE: Grossly normal   NECK: No mass noted; no stiffness   BACK:      LUNGS:   Auscultation: Negative for wheezing or crackles, symmetric chest rise on inhalation, respirations unlabored    HEART:  Regular rate and rhythm, significant  murmurs: Negative   ABDOMEN:   Soft, non-tender, bowel sounds normal ; no masses, no peritoneal signs   EXTREMITIES: No edema, dressing over the anterior knee incision site    SKIN: Breaks in the skin: Defer examination of the surgical site of the need to orthopedic surgery.   erythema and warmth: Defer examination of the skin at the surgical site to orthopedic surgery.     NEURO: Alert, oriented x3, no signs of acute stroke or change from prior state   PSYCH: Cooperative, behavior is appropriate, patient is in good spirits.     Medications:   Personally Reviewed.    aspirin  81 mg Oral BID     cyanocobalamin  1,000 mcg Oral Daily     docusate sodium  100 mg Oral BID     escitalopram  20 mg Oral Daily     ferrous sulfate  325 mg Oral BID     hydrOXYzine  25 mg Oral 4x Daily     nicotine  1 patch Transdermal Daily     nicotine   Transdermal Q8H     pantoprazole (PROTONIX) IV  40 mg Intravenous Q24H     polyethylene glycol  17 g Oral Daily     potassium chloride  10 mEq Oral BID     senna-docusate  1 tablet Oral BID     sodium chloride (PF)  3 mL Intracatheter Q8H     torsemide  20 mg Oral BID     Current Facility-Administered Medications   Medication Dose Route Frequency     acetaminophen  650 mg Oral Q4H PRN     sore throat lozenge  1 lozenge Buccal Q1H PRN     bisacodyl  10 mg Rectal Daily PRN     calcium carbonate  500 mg Oral 4x Daily PRN     HYDROmorphone  0.2 mg Intravenous Q2H PRN    Or     HYDROmorphone  0.4 mg Intravenous Q2H PRN     lidocaine 4%   Topical Q1H PRN     lidocaine (PF)  0.1-1 mL Other Q1H PRN     methocarbamol  500 mg Oral Q6H PRN     naloxone  0.2 mg Intravenous Q2 Min PRN    Or     naloxone  0.4 mg Intravenous Q2 Min PRN    Or     naloxone  0.2 mg Intramuscular Q2 Min PRN    Or     naloxone  0.4 mg Intramuscular Q2 Min PRN     ondansetron  4 mg Oral Q6H PRN    Or     ondansetron  4 mg Intravenous Q6H PRN     oxyCODONE  5 mg Oral Q4H PRN    Or     oxyCODONE  10 mg Oral Q4H PRN      prochlorperazine  5 mg Intravenous Q6H PRN     prochlorperazine  5 mg Oral Q6H PRN     sodium chloride (PF)  3 mL Intracatheter q1 min prn     sodium chloride (PF)  3 mL Intracatheter PRN         Labs: reviewed  CBC RESULTS: Recent Labs   Lab Test 07/12/21  0552 07/11/21  0854   WBC  --  7.9   RBC  --  2.48*   HGB 7.2* 7.9*   HCT  --  24.7*   MCV  --  100   MCH  --  31.9   MCHC  --  32.0   RDW  --  13.8   PLT  --  287       Imaging:  Defer review to orthopedics        Dotty Nelson MD  [facility]  Phone: #[phone]

## 2021-07-13 NOTE — PLAN OF CARE
Patient vital signs are at baseline: Yes  Patient able to ambulate as they were prior to admission or with assist devices provided by therapies during their stay:  Yes  Patient MUST void prior to discharge:  Yes  Patient able to tolerate oral intake:  Yes  Pain has adequate pain control using Oral analgesics:  Yes    Problem: Adult Inpatient Plan of Care  Goal: Readiness for Transition of Care  Outcome: Adequate for Discharge   Pt looking forward to going to [facility] today and is cognizant of this.  Delirium continues to be present but is more oriented today from yesterday.  Alarms in use but has not been impulsive today.  Avoiding narcotics for pain.  Avoiding tape to surgical knee due to fragility of skin and blistering.      Plan: discharge to TCU at 3pm via ambulance transfer due to level of cognitive safety.

## 2021-07-13 NOTE — PLAN OF CARE
Problem: Risk for Delirium  Goal: Improved Behavioral Control  Outcome: Improving     Problem: Risk for Delirium  Goal: Improved Sleep  Outcome: Improving   Pt is disoriented to time and situation overnight. Sleeping throughout the shift. Only awakened and set the alarm off once. Calm, cooperative and redirectable this shift.     Problem: Adult Inpatient Plan of Care  Goal: Absence of Hospital-Acquired Illness or Injury  Intervention: Prevent Skin Injury  Recent Flowsheet Documentation  Taken 7/13/2021 0540 by John Hinojosa RN  Body Position:   turned   semi-prone   position changed independently  Taken 7/13/2021 0330 by John Hinojosa RN  Body Position:   right   turned  Taken 7/13/2021 0146 by John Hinojosa RN  Body Position:   turned   position changed independently   Pt is incontinent of urine, is able to turn with cares easily to the right side. Skin intact to buttocks/sacrum. Knee dressing dry and intact, tubi  on top.     Patient vital signs are at baseline: Yes  Patient able to ambulate as they were prior to admission or with assist devices provided by therapies during their stay:  Yes  Patient MUST void prior to discharge:  Yes  Patient able to tolerate oral intake:  Yes  Pain has adequate pain control using Oral analgesics:  Yes    Going to TCU at discharge, pending insurance authorization.

## 2021-07-13 NOTE — DISCHARGE SUMMARY
"Orthopedic Discharge Summary    Dread Justice,  10/21/1934, MRN [id number]    Admission Date: 2021  Admission Diagnoses: Osteoarthritis of left knee [M17.12]     Discharge Date:  21    Post-operative Day:       Reason for Admission: The patient was admitted for the following:  [unfilled]    Brief Hospital Course: This 86 year old male underwent the aforementioned procedure with Dr. Frazier on 21. There were no intraoperative complications and the patient was transferred to the recovery room and later the orthopedic unit in stable condition. Once the patient reached the orthopedic floor our orthopedic pain protocol was implemented along with the following:  Therapy: PT/OT  Anticoagulation Medications: ASA     Complications during admission: None  Consultations during admission: Hospitalist service for medical management     Pertinent Results at Discharge:    Hemoglobin   Date/Time Value Ref Range Status   2021 05:52 AM 7.2 (L) 13.3 - 17.7 g/dL Final   2021 08:54 AM 7.9 (L) 13.3 - 17.7 g/dL Final   2020 09:56 AM 10.4 (L) 14.0 - 18.0 g/dL Final     Platelet Count   Date/Time Value Ref Range Status   2021 08:54  150 - 450 10e3/uL Final   2020 09:56  140 - 440 thou/uL Final   2020 01:51  140 - 440 thou/uL Final     /58 (BP Location: Right arm)   Pulse 79   Temp 98.7  F (37.1  C) (Oral)   Resp 18   Ht 1.676 m (5' 6\")   Wt 78.5 kg (173 lb 1 oz)   SpO2 94%   BMI 27.93 kg/m      Active Problems:    Osteoarthritis of left knee      Discharge Information:  Condition at discharge: Good  Discharge destination: TCU    Medications at discharge:    Nikita Justice   Home Medication Instructions JAVID:[id number]    Printed on:21 1321   Medication Information                      acetaminophen (TYLENOL) 325 MG tablet  [ACETAMINOPHEN (TYLENOL) 325 MG TABLET] Take 3 tablets (975 mg total) by mouth every 8 (eight) hours.             aspirin 81 mg " chewable tablet  [ASPIRIN 81 MG CHEWABLE TABLET] Chew 1 tablet (81 mg total) 2 (two) times a day.             cefadroxil (DURICEF) 500 MG capsule  [CEFADROXIL (DURICEF) 500 MG CAPSULE] Take 1 capsule (500 mg total) by mouth 2 (two) times a day for 7 days.             escitalopram oxalate (LEXAPRO) 20 MG tablet  [ESCITALOPRAM OXALATE (LEXAPRO) 20 MG TABLET] Take 20 mg by mouth daily.              ferrous sulfate 325 (65 FE) MG tablet  [FERROUS SULFATE 325 (65 FE) MG TABLET] Take 1 tablet by mouth 2 (two) times a day.              hydrOXYzine pamoate (VISTARIL) 25 MG capsule  [HYDROXYZINE PAMOATE (VISTARIL) 25 MG CAPSULE] Take 1 capsule (25 mg total) by mouth 3 (three) times a day as needed for itching.             oxyCODONE (ROXICODONE) 5 MG tablet  Take 1 tablet (5 mg) by mouth every 4 hours as needed for pain             potassium chloride (KLOR-CON) 10 MEQ CR tablet  [POTASSIUM CHLORIDE (KLOR-CON) 10 MEQ CR TABLET] Take 10 mEq by mouth 2 (two) times a day.              senna-docusate (SENNOSIDES-DOCUSATE SODIUM) 8.6-50 mg tablet  [SENNA-DOCUSATE (SENNOSIDES-DOCUSATE SODIUM) 8.6-50 MG TABLET] Take 1-2 tablets by mouth 2 times a day at 6:00 am and 4:00 pm.             torsemide (DEMADEX) 20 MG tablet  [TORSEMIDE (DEMADEX) 20 MG TABLET] Take 20 mg by mouth 2 (two) times a day.             vitamin B-12 (CYANOCOBALAMIN) 1000 MCG tablet  Take 1,000 mcg by mouth daily                 Bracing: None    Activity: WBAT      Follow-up Care:  The patient will be followed in our office in 2 weeks or sooner should the need arise.  Patient should follow up with their PCP as directed.  Patient was seen by myself on the date of discharge.    Aidee Contreras PA-C, SHARIFA  Date: 7/13/2021  Time: 1:21 PM

## 2021-07-13 NOTE — PROGRESS NOTES
Care Management Follow Up    Length of Stay (days): 3    Expected Discharge Date: 07/13/2021     Concerns to be Addressed:       Patient plan of care discussed at interdisciplinary rounds: Yes    Anticipated Discharge Disposition: Skilled Nursing Facilty     Anticipated Discharge Services:  PT/OT  Anticipated Discharge DME: None    Patient/family educated on Medicare website which has current facility and service quality ratings: yes (patient and friend Moi)  Education Provided on the Discharge Plan:    Patient/Family in Agreement with the Plan: yes (patient and friend Moi)    Referrals Placed by CM/SW:  [facility]  Private pay costs discussed: Not applicable    Additional Information:  Imelda from [facility]CU admissions reports she called to check on the status of the insurance authorization this morning.    The company reviewing for the authorization reportedly states they have requested additional information be provided three times now and will close the case if not received by 1230hrs today 7/13. However, there is no prior documentation on the hospital's end that this has occurred.    Additional info. has now been provided to the TCU by this writer and they will relay to the authorization people.    Further, a [id number] review by phone may be required if not approved by 1230hrs. The phone number to call for the [id number] review is [phone].    If approved for TCU placement, pt can likely discharge today via friends to [facility].    CM to follow.    Lv Puente RN

## 2021-07-13 NOTE — PLAN OF CARE
Pt discharged with Ambulance stretcher at 4pm. AVS given. Pt belongings sent with family friend to [facility].     Sania Nichole RN

## 2021-07-13 NOTE — PROGRESS NOTES
[facility] MEDICINE CONSULT PROGRESS NOTE      Identification/Summary: Dread Justice is a 86 year old male with a past medical history of MDD, COPD who was admitted on 7/7/2021 for left TKA. Hospital course is notable for [facility] consultation for post-op medical management and acute encephalopathy.     Assessment and Plan:  S/p left TKA under peripheral nerve block and spinal 7/7/2021  POD#4  Pain and bowel management   DVT prophylaxis per ortho  Encourage PT/OT  Encourage incentive spirometry      Acute blood loss anemia  Hb down to 8.2--> 7.9-->7.2 today  He has history of GIB  Stool negative for occult blood  Discontinue Protonix IV     Acute encephalpathy, multifactorial, significantly improved  Likely with underlying dementia      History of alcohol abuse  No withdrawal  last drink was 43 years ago     Mood disorder  Resume pta escitalopram     Macular degeneration, legally blind  Supportive treatment  Lives alone, going to TCU  -needs to  Go to TCU for this reason.     CKD, overall stable     Chronic smoking and COPD  Post op hypoxia, resolved  Stable      Chronic diuretic treatment for chronic edema  On torsemide, resume     Interval History/Subjective:  NAEO.     No chest pain or SOB. No LH or dizziness. Pain well-tolerated. Medication reconciliation completed for anticipated discharge and discussed with patient as well as counseling regarding lifestyle modifications and behaviors in setting of post-operative recovery in the coming weeks, outpatient rehabilitative follow-up plan pending final PT/OT recommendations, and follow-up in clinic with the primary care provider and with surgery as scheduled. All questions were answered to stated and verbalized satisfaction.     Physical Exam/Objective:  Temp:  [98.3  F (36.8  C)-98.7  F (37.1  C)] 98.7  F (37.1  C)  Pulse:  [79-83] 79  Resp:  [17-20] 18  BP: (117-129)/(56-62) 129/58  SpO2:  [93 %-94 %] 94 %    Body mass index is 27.93  kg/m .    GENERAL:  Alert, appears comfortable, in no acute distress, appears stated age   HEAD:  Normocephalic, without obvious abnormality, atraumatic   EYES:  PERRL, conjunctiva/corneas clear, no scleral icterus, EOM's intact   NOSE: Nares normal, septum midline, mucosa normal, no drainage   THROAT: Lips, mucosa, and tongue normal; teeth and gums normal, mouth moist   NECK: Supple, symmetrical, trachea midline   BACK:   Symmetric, no curvature, ROM normal   LUNGS:   Clear to auscultation bilaterally, no rales, rhonchi, or wheezing, symmetric chest rise on inhalation, respirations unlabored   CHEST WALL:  No tenderness or deformity   HEART:  Regular rate and rhythm, S1 and S2 normal, no murmur, rub, or gallop    ABDOMEN:   Soft, non-tender, bowel sounds active all four quadrants, no masses, no organomegaly, no rebound or guarding   EXTREMITIES: Extremities normal, atraumatic, no cyanosis or edema    SKIN: Dry to touch, no exanthems in the visualized areas   NEURO: Alert, oriented x3, moves all four extremities freely   PSYCH: Cooperative, behavior is appropriate      Medications:   Personally Reviewed.  Medications       aspirin  81 mg Oral BID     cyanocobalamin  1,000 mcg Oral Daily     escitalopram  20 mg Oral Daily     ferrous sulfate  325 mg Oral BID     hydrOXYzine  25 mg Oral 4x Daily     nicotine  1 patch Transdermal Daily     nicotine   Transdermal Q8H     polyethylene glycol  17 g Oral Daily     potassium chloride  10 mEq Oral BID     senna-docusate  1 tablet Oral BID     sodium chloride (PF)  3 mL Intracatheter Q8H     torsemide  20 mg Oral BID       Data reviewed today: I personally reviewed all new medications, labs, imaging/diagnostics reports over the past 24 hours. Pertinent findings include:    Imaging:   No results found for this or any previous visit (from the past 24 hour(s)).    Labs:  Most Recent 3 CBC's:Recent Labs   Lab Test 07/12/21  0552 07/11/21  0854 07/14/20  0956 06/29/20  1351   WBC   --  7.9 6.1 6.3   HGB 7.2* 7.9* 10.4* 10.3*   MCV  --  100 93 94   PLT  --  287 331 304       >35 mins with 50% of the floor time (for inpatient hospital services) spent providing counseling or coordination of care (C/CC).  This includes stabilizing the patient's hemodynamics including blood pressure and heart rate, formulating the appropriate care plan for today, and also extensive counseling regarding disease management, lifestyle modifications, and medication regimen with the patient and/or caregivers. Coordination of care for outpatient programs and resources is also crucial and discussed with patient and/or caregivers and completed updated medication reconciliation for safe discharge.      Jasen Saez MD  [facility]  Phone: #[phone]

## 2021-07-13 NOTE — PLAN OF CARE
"Pt continues to experience delirium. He is unsteady on his feet (post op day 5 after a left knee replacement). Pt wears an incontinent garment yet manages to void around it at times. Pt is blind and and has difficulty following instruction/redirection at times. Pt was not argumentative for me this shift; though other staff reported pt was insisting on \"going back to my apartment\" and didn't want to be redirected. Pt to go to TCU presuming his mentation returns to near baseline.  "

## 2021-07-13 NOTE — PROGRESS NOTES
"Orthopedic Progress Note      Assessment:    S/P      Plan:   - Continue PT/OT  - Weightbearing status: WBAT  - Anticoagulation: ASA 81 PO BID in addition to SCDs, zainab stockings and early ambulation.  - Discharge planning: TCU Pending placement. Okay to discharge per ortho      Subjective:  Pain: moderate  Nausea, Vomiting:  No  Lightheadedness, Dizziness:  No  Neuro:  Patient denies new onset numbness or paresthesias    Patient is doing okay today. No new complaints.     Objective:  /58 (BP Location: Right arm)   Pulse 79   Temp 98.7  F (37.1  C) (Oral)   Resp 18   Ht 1.676 m (5' 6\")   Wt 78.5 kg (173 lb 1 oz)   SpO2 94%   BMI 27.93 kg/m    The patient is A&Ox3. Appears comfortable.   Sensation is intact.  Dorsiflexion and plantar flexion is intact.  Dorsalis pedis pulse intact.  Calves are soft and non-tender. Negative Denise's.  The incision is covered. Dressing C/D/I. Mepilex covering fracture blisters.       Pertinent Labs   Lab Results: personally reviewed.   No results found for: INR, PROTIME  Lab Results   Component Value Date    WBC 7.9 07/11/2021    HGB 7.2 (L) 07/12/2021    HCT 24.7 (L) 07/11/2021     07/11/2021     07/11/2021     Lab Results   Component Value Date     04/07/2021    CO2 18 (L) 04/07/2021         Report completed by:  Aidee Contreras PA-C, SHARIFA  Date: 7/13/2021  Time: 11:18 AM    "

## 2021-07-13 NOTE — DISCHARGE SUMMARY
Occupational Therapy Discharge Summary    Date of OT Discharge: 7/13/2021    Refer to daily doc flowsheet for equipment issued and current functional status.  Discharge Destination: TCU  Discharge Comments: Goals not met due to slower progression of goal completion. Progress toward established goals are documented in flowsheets.      Please note that if goals are not fully met the patient is making progress towards established goals, which is documented in flowsheet notes. If further therapy is recommended it is related to documented deficits, and is necessary to maximize functional independence in order for patient to return to previous level of function.      7/13/2021 by Mary Reyes, OTR/ROSENDO

## 2021-07-14 NOTE — LETTER
2021        RE: Dread Justice  [address]  [place] [address]        [facility] GERIATRIC SERVICES       Patient Dread Justice  MRN: [id number]        Reason for Visit     Chief Complaint   Patient presents with     Establish Care       Code Status   CPR/Full code       Assessment     Status post left total knee arthroplasty on 2021.  Pain management.  Acute blood loss anemia.  Acute metabolic encephalopathy with suspected underlying dementia.  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  Patient admitted after left TKA done as an elective procedure.  Weightbearing as tolerated.  Continue with incisional cares.  Continue aggressive PT OT and follow-up with orthopedics as scheduled.  Monitor hemoglobin currently on oral iron.  Had acute encephalopathy and will monitor cognitive status.  Suspected that he has underlying dementia.  There is also history of alcohol abuse but last drink was several years ago and did not go through any withdrawal  Has multiple other comorbidities include ongoing smoking and will be in a smoke-free facility.  He had some postoperative hypoxia due to COPD which resolved prior to discharge.  He has macular degeneration and is legally blind and is not felt he is not safe to discharge home till he is more stable  Care plan reviewed with the patient in the presence of his 2 caregivers.  Several psychosocial concerns were reviewed.  Caregiver feels he is not back to baseline but has been noticing more confusion.  His wife  2 years ago since then he has been declining.  He lives alone in his own home and the caregiver goes and visits him 3 times a day but does not stay with him.  There is concern about safety.  They also do not want any oxycodone administered.  Pain management reviewed both with patient was reporting a higher level of pain and caregiver concerns  as well as nursing.  We will schedule Vistaril 25 mg 3 times daily for him for now.  I did review with the caregiver we will not discontinue oxycodone for now because of his high concerns of pain but will use it sparingly only if really needed.  If he does not need it we will discontinue it by next week.  Icing requested for the knee.  Topical interventions requested from therapy.  I did also review care plan with therapy who updated me that he is physically quite debilitated with impaired balance and is a high fall risk.  Care conference requested with the patient in the presence of his caregivers to discuss long-term plans.  Apparently he does not have any other family member involved in his care.  Monitor cognitive status very closely which will determine his ability to go back to living independently in his home environment.  Total time spent is 45 minutes with 30 minutes spent face-to-face talking to the patient reviewing his concerns along with caregiver concerns and discharge planning and pain management along with concerns from therapy.  Mood and behaviors were also reviewed with nursing.  Patient has been intermittently quite agitated and caregiver also endorses that    History     Patient is a very pleasant 86 year old male who is admitted to TCU  Patient here with l TKA  This wasdone as an elective procedure. Patient  was admitted and under went the procedure in hospital on 7/7/21  Post operative course complicated by acute anemia due to blood loss .  Hemoglobin dropped down to 7.2.  He also developed acute encephalopathy.  Felt to be underlying dementia.  There is a history of alcohol abuse.  Pain management -pt is currently on tylenol   Has oxycodone available as needed only  Continues to rate his pain is high.  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  MRI showed a 4.5 cm infarct in the left cerebellum.     Chronic headaches      COPD (chronic obstructive pulmonary disease) (H)      Depression      Iron deficiency anemia 10/03/2012     Macular degeneration, bilateral      Osteoarthritis      Peptic ulcer disease 1975    Secondary to alcohol use.     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      Past Social History     Reviewed,  reports that he has been smoking cigarettes. He has been smoking about 0.50 packs per day. He has never used smokeless tobacco. He reports that he does not drink alcohol and does not use drugs.    Family History     Reviewed, and family history includes Cancer (age of onset: 76.00) in his sister; Cerebrovascular Disease (age of onset: 79.00) in his father; Coronary Artery Disease in his father; Heart Failure (age of onset: 93.00) in his mother; No Known Problems in his daughter and son.    Medication List   Post Discharge Medication Reconciliation Status: Post Discharge Medication Reconciliation Status: discharge medications reconciled, continue medications without change.  Current Outpatient Medications   Medication     acetaminophen (TYLENOL) 325 MG tablet     aspirin 81 mg chewable tablet     escitalopram oxalate (LEXAPRO) 20 MG tablet     ferrous sulfate 325 (65 FE) MG tablet     hydrOXYzine pamoate (VISTARIL) 25 MG capsule     oxyCODONE (ROXICODONE) 5 MG tablet     potassium chloride (KLOR-CON) 10 MEQ CR tablet      "senna-docusate (SENNOSIDES-DOCUSATE SODIUM) 8.6-50 mg tablet     torsemide (DEMADEX) 20 MG tablet     vitamin B-12 (CYANOCOBALAMIN) 1000 MCG tablet     No current facility-administered medications for this visit.       Allergies     Allergies   Allergen Reactions     Ibuprofen Rash     Tolerated 15mg IV toradol on 5/18/19, no rash       Review of Systems   A comprehensive review of 14 systems was done. Pertinent findings noted here and in history of present illness. All the rest negative.  Constitutional: Negative.  Negative for fever, chills, he has activity change, appetite change and fatigue.   HENT: Negative for congestion and facial swelling.    Eyes: Negative for photophobia, redness and visual disturbance.   Respiratory: Negative for cough and chest tightness.    Cardiovascular: Negative for chest pain, palpitations and leg swelling.   Gastrointestinal: Negative for nausea, diarrhea, constipation, blood in stool and abdominal distention.   Genitourinary: Negative.    Musculoskeletal: Reporting high pain in the left knee.  Has difficulty walking and has gait instability  Skin: Negative.    Neurological: Negative for dizziness, tremors, syncope, weakness, light-headedness and headaches.   Hematological: Does not bruise/bleed easily.   Psychiatric/Behavioral: Confused with intermittent agitation.  He has been reporting the ER to be 2001      Physical Exam     /68   Pulse 80   Temp 98  F (36.7  C)   Resp 16   Ht 1.676 m (5' 6\")   Wt 78.5 kg (173 lb 1 oz)   SpO2 96%   BMI 27.93 kg/m      Constitutional: Oriented to person, and appears well-developed.   HEENT:  Normocephalic and atraumatic.  Eyes: Conjunctivae and EOM are normal. Pupils are equal, round, and reactive to light. No discharge.  No scleral icterus. Nose normal. Mouth/Throat: Oropharynx is clear and moist. No oropharyngeal exudate.    Legally blind  NECK: Normal range of motion. Neck supple. No JVD present. No tracheal deviation present. No " thyromegaly present.   CARDIOVASCULAR: Normal rate, regular rhythm and intact distal pulses.  Exam reveals no gallop and no friction rub.  Systolic murmur present.  PULMONARY: Effort normal and breath sounds normal. No respiratory distress.No Wheezing or rales.  ABDOMEN: Soft. Bowel sounds are normal. No distension and no mass.  There is no tenderness. There is no rebound and no guarding. No HSM.  MUSCULOSKELETAL: Normal range of motion. No edema and no tenderness. Mild kyphosis, no tenderness.  Left knee surgical incision is intact.  Very tender to touch  LYMPH NODES: Has no cervical, supraclavicular, axillary and groin adenopathy.   NEUROLOGICAL: Alert and oriented to person, . No cranial nerve deficit.  Normal muscle tone. Coordination normal.   GENITOURINARY: Deferred exam.  SKIN: Skin is warm and dry. No rash noted. No erythema. No pallor.   EXTREMITIES: No cyanosis, no clubbing, no edema. No Deformity.  PSYCHIATRIC: abNormal mood, affect and behavior.  Noted to be agitated and pulling at the blanket.  Confused with limited recall      Lab Results     Recent Results (from the past 240 hour(s))   CBC with platelets    Collection Time: 07/11/21  8:54 AM   Result Value Ref Range    WBC Count 7.9 4.0 - 11.0 10e3/uL    RBC Count 2.48 (L) 4.40 - 5.90 10e6/uL    Hemoglobin 7.9 (L) 13.3 - 17.7 g/dL    Hematocrit 24.7 (L) 40.0 - 53.0 %     78 - 100 fL    MCH 31.9 26.5 - 33.0 pg    MCHC 32.0 31.5 - 36.5 g/dL    RDW 13.8 10.0 - 15.0 %    Platelet Count 287 150 - 450 10e3/uL   Magnesium    Collection Time: 07/11/21  8:54 AM   Result Value Ref Range    Magnesium 2.1 1.8 - 2.6 mg/dL   UA reflex to Microscopic    Collection Time: 07/12/21  3:25 AM   Result Value Ref Range    Color Urine Colorless Colorless, Straw, Light Yellow, Yellow    Appearance Urine Clear Clear    Glucose Urine Negative Negative mg/dL    Bilirubin Urine Negative Negative    Ketones Urine Negative Negative mg/dL    Specific Gravity Urine 1.010  "1.001 - 1.030    Blood Urine Negative Negative    pH Urine 5.0 5.0 - 7.0    Protein Albumin Urine Negative Negative mg/dL    Urobilinogen Urine <2.0 <2.0 mg/dL    Nitrite Urine Negative Negative    Leukocyte Esterase Urine Negative Negative   Hemoglobin    Collection Time: 07/12/21  5:52 AM   Result Value Ref Range    Hemoglobin 7.2 (L) 13.3 - 17.7 g/dL   Extra Red Top Tube    Collection Time: 07/12/21  5:52 AM   Result Value Ref Range    Hold Specimen JIC    Extra Green Top (Lithium Heparin) Tube    Collection Time: 07/12/21  5:52 AM   Result Value Ref Range    Hold Specimen JIC              Imaging Results     POC US GUIDANCE NEEDLE PLACEMENT    Result Date: 7/7/2021  Ultrasound was performed as guidance to an anesthesia procedure.  Click \"PACS images\" hyperlink below to view any stored images.  For specific procedure details, view procedure note authored by anesthesia.    POC US GUIDANCE NEEDLE PLACEMENT    Result Date: 7/7/2021  Ultrasound was performed as guidance to an anesthesia procedure.  Click \"PACS images\" hyperlink below to view any stored images.  For specific procedure details, view procedure note authored by anesthesia.    XR Knee Port Left 1/2 Views    Result Date: 7/7/2021  EXAM: XR KNEE LEFT 1 OR 2 VWS PORTABLE LOCATION: [facility] DATE/TIME: 7/7/2021 12:08 PM INDICATION: Status post knee surgery COMPARISON: None.     Postoperative changes of left total knee arthroplasty. Components appear well seated. Air within the joint and surrounding soft tissues.          Electronically signed by  LEON Burns                             Sincerely,        LEON Burns    "

## 2021-07-14 NOTE — PROGRESS NOTES
[facility] GERIATRIC SERVICES       Patient Dread Justice  MRN: [id number]        Reason for Visit     Chief Complaint   Patient presents with     Establish Care       Code Status   CPR/Full code       Assessment     Status post left total knee arthroplasty on 2021.  Pain management.  Acute blood loss anemia.  Acute metabolic encephalopathy with suspected underlying dementia.  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  Patient admitted after left TKA done as an elective procedure.  Weightbearing as tolerated.  Continue with incisional cares.  Continue aggressive PT OT and follow-up with orthopedics as scheduled.  Monitor hemoglobin currently on oral iron.  Had acute encephalopathy and will monitor cognitive status.  Suspected that he has underlying dementia.  There is also history of alcohol abuse but last drink was several years ago and did not go through any withdrawal  Has multiple other comorbidities include ongoing smoking and will be in a smoke-free facility.  He had some postoperative hypoxia due to COPD which resolved prior to discharge.  He has macular degeneration and is legally blind and is not felt he is not safe to discharge home till he is more stable  Care plan reviewed with the patient in the presence of his 2 caregivers.  Several psychosocial concerns were reviewed.  Caregiver feels he is not back to baseline but has been noticing more confusion.  His wife  2 years ago since then he has been declining.  He lives alone in his own home and the caregiver goes and visits him 3 times a day but does not stay with him.  There is concern about safety.  They also do not want any oxycodone administered.  Pain management reviewed both with patient was reporting a higher level of pain and caregiver concerns as well as nursing.  We will schedule Vistaril 25 mg 3 times daily for him for now.  I did  review with the caregiver we will not discontinue oxycodone for now because of his high concerns of pain but will use it sparingly only if really needed.  If he does not need it we will discontinue it by next week.  Icing requested for the knee.  Topical interventions requested from therapy.  I did also review care plan with therapy who updated me that he is physically quite debilitated with impaired balance and is a high fall risk.  Care conference requested with the patient in the presence of his caregivers to discuss long-term plans.  Apparently he does not have any other family member involved in his care.  Monitor cognitive status very closely which will determine his ability to go back to living independently in his home environment.  Total time spent is 45 minutes with 30 minutes spent face-to-face talking to the patient reviewing his concerns along with caregiver concerns and discharge planning and pain management along with concerns from therapy.  Mood and behaviors were also reviewed with nursing.  Patient has been intermittently quite agitated and caregiver also endorses that    History     Patient is a very pleasant 86 year old male who is admitted to TCU  Patient here with l TKA  This wasdone as an elective procedure. Patient  was admitted and under went the procedure in hospital on 7/7/21  Post operative course complicated by acute anemia due to blood loss .  Hemoglobin dropped down to 7.2.  He also developed acute encephalopathy.  Felt to be underlying dementia.  There is a history of alcohol abuse.  Pain management -pt is currently on tylenol   Has oxycodone available as needed only  Continues to rate his pain is high.  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  MRI showed a 4.5 cm infarct in the left cerebellum.     Chronic headaches      COPD (chronic obstructive pulmonary disease) (H)      Depression      Iron deficiency anemia 10/03/2012     Macular degeneration, bilateral      Osteoarthritis      Peptic ulcer disease 1975    Secondary to alcohol use.     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      Past Social History     Reviewed,  reports that he has been smoking cigarettes. He has been smoking about 0.50 packs per day. He has never used smokeless tobacco. He reports that he does not drink alcohol and does not use drugs.    Family History     Reviewed, and family history includes Cancer (age of onset: 76.00) in his sister; Cerebrovascular Disease (age of onset: 79.00) in his father; Coronary Artery Disease in his father; Heart Failure (age of onset: 93.00) in his mother; No Known Problems in his daughter and son.    Medication List   Post Discharge Medication Reconciliation Status: Post Discharge Medication Reconciliation Status: discharge medications reconciled, continue medications without change.  Current Outpatient Medications   Medication     acetaminophen (TYLENOL) 325 MG tablet     aspirin 81 mg chewable tablet     escitalopram oxalate (LEXAPRO) 20 MG tablet     ferrous sulfate 325 (65 FE) MG tablet     hydrOXYzine pamoate (VISTARIL) 25 MG capsule     oxyCODONE (ROXICODONE) 5 MG tablet     potassium chloride (KLOR-CON) 10 MEQ CR tablet     senna-docusate (SENNOSIDES-DOCUSATE SODIUM) 8.6-50 mg tablet     torsemide (DEMADEX) 20 MG tablet      "vitamin B-12 (CYANOCOBALAMIN) 1000 MCG tablet     No current facility-administered medications for this visit.       Allergies     Allergies   Allergen Reactions     Ibuprofen Rash     Tolerated 15mg IV toradol on 5/18/19, no rash       Review of Systems   A comprehensive review of 14 systems was done. Pertinent findings noted here and in history of present illness. All the rest negative.  Constitutional: Negative.  Negative for fever, chills, he has activity change, appetite change and fatigue.   HENT: Negative for congestion and facial swelling.    Eyes: Negative for photophobia, redness and visual disturbance.   Respiratory: Negative for cough and chest tightness.    Cardiovascular: Negative for chest pain, palpitations and leg swelling.   Gastrointestinal: Negative for nausea, diarrhea, constipation, blood in stool and abdominal distention.   Genitourinary: Negative.    Musculoskeletal: Reporting high pain in the left knee.  Has difficulty walking and has gait instability  Skin: Negative.    Neurological: Negative for dizziness, tremors, syncope, weakness, light-headedness and headaches.   Hematological: Does not bruise/bleed easily.   Psychiatric/Behavioral: Confused with intermittent agitation.  He has been reporting the ER to be 2001      Physical Exam     /68   Pulse 80   Temp 98  F (36.7  C)   Resp 16   Ht 1.676 m (5' 6\")   Wt 78.5 kg (173 lb 1 oz)   SpO2 96%   BMI 27.93 kg/m      Constitutional: Oriented to person, and appears well-developed.   HEENT:  Normocephalic and atraumatic.  Eyes: Conjunctivae and EOM are normal. Pupils are equal, round, and reactive to light. No discharge.  No scleral icterus. Nose normal. Mouth/Throat: Oropharynx is clear and moist. No oropharyngeal exudate.    Legally blind  NECK: Normal range of motion. Neck supple. No JVD present. No tracheal deviation present. No thyromegaly present.   CARDIOVASCULAR: Normal rate, regular rhythm and intact distal pulses.  Exam " reveals no gallop and no friction rub.  Systolic murmur present.  PULMONARY: Effort normal and breath sounds normal. No respiratory distress.No Wheezing or rales.  ABDOMEN: Soft. Bowel sounds are normal. No distension and no mass.  There is no tenderness. There is no rebound and no guarding. No HSM.  MUSCULOSKELETAL: Normal range of motion. No edema and no tenderness. Mild kyphosis, no tenderness.  Left knee surgical incision is intact.  Very tender to touch  LYMPH NODES: Has no cervical, supraclavicular, axillary and groin adenopathy.   NEUROLOGICAL: Alert and oriented to person, . No cranial nerve deficit.  Normal muscle tone. Coordination normal.   GENITOURINARY: Deferred exam.  SKIN: Skin is warm and dry. No rash noted. No erythema. No pallor.   EXTREMITIES: No cyanosis, no clubbing, no edema. No Deformity.  PSYCHIATRIC: abNormal mood, affect and behavior.  Noted to be agitated and pulling at the blanket.  Confused with limited recall      Lab Results     Recent Results (from the past 240 hour(s))   CBC with platelets    Collection Time: 07/11/21  8:54 AM   Result Value Ref Range    WBC Count 7.9 4.0 - 11.0 10e3/uL    RBC Count 2.48 (L) 4.40 - 5.90 10e6/uL    Hemoglobin 7.9 (L) 13.3 - 17.7 g/dL    Hematocrit 24.7 (L) 40.0 - 53.0 %     78 - 100 fL    MCH 31.9 26.5 - 33.0 pg    MCHC 32.0 31.5 - 36.5 g/dL    RDW 13.8 10.0 - 15.0 %    Platelet Count 287 150 - 450 10e3/uL   Magnesium    Collection Time: 07/11/21  8:54 AM   Result Value Ref Range    Magnesium 2.1 1.8 - 2.6 mg/dL   UA reflex to Microscopic    Collection Time: 07/12/21  3:25 AM   Result Value Ref Range    Color Urine Colorless Colorless, Straw, Light Yellow, Yellow    Appearance Urine Clear Clear    Glucose Urine Negative Negative mg/dL    Bilirubin Urine Negative Negative    Ketones Urine Negative Negative mg/dL    Specific Gravity Urine 1.010 1.001 - 1.030    Blood Urine Negative Negative    pH Urine 5.0 5.0 - 7.0    Protein Albumin Urine  "Negative Negative mg/dL    Urobilinogen Urine <2.0 <2.0 mg/dL    Nitrite Urine Negative Negative    Leukocyte Esterase Urine Negative Negative   Hemoglobin    Collection Time: 07/12/21  5:52 AM   Result Value Ref Range    Hemoglobin 7.2 (L) 13.3 - 17.7 g/dL   Extra Red Top Tube    Collection Time: 07/12/21  5:52 AM   Result Value Ref Range    Hold Specimen JIC    Extra Green Top (Lithium Heparin) Tube    Collection Time: 07/12/21  5:52 AM   Result Value Ref Range    Hold Specimen JIC              Imaging Results     POC US GUIDANCE NEEDLE PLACEMENT    Result Date: 7/7/2021  Ultrasound was performed as guidance to an anesthesia procedure.  Click \"PACS images\" hyperlink below to view any stored images.  For specific procedure details, view procedure note authored by anesthesia.    POC US GUIDANCE NEEDLE PLACEMENT    Result Date: 7/7/2021  Ultrasound was performed as guidance to an anesthesia procedure.  Click \"PACS images\" hyperlink below to view any stored images.  For specific procedure details, view procedure note authored by anesthesia.    XR Knee Port Left 1/2 Views    Result Date: 7/7/2021  EXAM: XR KNEE LEFT 1 OR 2 VWS PORTABLE LOCATION: [facility] DATE/TIME: 7/7/2021 12:08 PM INDICATION: Status post knee surgery COMPARISON: None.     Postoperative changes of left total knee arthroplasty. Components appear well seated. Air within the joint and surrounding soft tissues.          Electronically signed by  LEON Burns                       "

## 2021-07-19 NOTE — PROGRESS NOTES
Code Status:  FULL CODE  Visit Type: RECHECK     Facility:   [facility] [place] ([facility]) [[id number]]        History of Present Illness:   Hospital Admission Date: 7/7/2021 Hospital Discharge Date: 7/13/2021      Dread Justice is a 86 year old male with a past medical history for CVA, COPD (smoker), OA and macular degeneration.  She was recently hospitalized for planned left TKA 2/2 OA.  Discharge hgb 7.2. Orthopedics discharge states he had no postoperative issues.  He was discharged to TCU with Cefadroxil x 7 days.     Today, he reports his pain is 6-7/10 however is vague at what his pain really is.  When asked what acceptable level of pain would be he states 5/10.  Nursing reports that he is requiring lots of convincing to take his pain medications even when he is in visible pain.  He reports pain is mostly behind leg in quadricep area.     He does have an inspiratory wheeze during exam however denies SOB.      BPS recorded yesterday was elevated SBP 170s however his BP is not being recorded daily and all other Bps are acceptable.     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  MRI showed a 4.5 cm infarct in the left cerebellum.     Chronic headaches      COPD (chronic obstructive pulmonary disease) (H)      Depression      Iron deficiency anemia 10/03/2012     Macular degeneration, bilateral      Osteoarthritis      Peptic ulcer disease 1975    Secondary to alcohol use.     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  Diverticulosis,  internal hemorrhoids.     ESOPHAGOSCOPY, GASTROSCOPY, DUODENOSCOPY (EGD), COMBINED  10/04/2012    Chronic gastritis.     INGUINAL HERNIA REPAIR Right 7/8/2014    Procedure: HERNIA REPAIR INGUINAL, RIGHT;  Surgeon: Mack Kowalski MD;  Location: [facility];  Service:      [facility]  12/2/2014          TONSILLECTOMY  1949     Family History   Problem Relation Age of Onset     Heart Failure Mother 93.00     Cerebrovascular Disease Father 79.00     Coronary Artery Disease Father      Cancer Sister 76.00     No Known Problems Son         Not in contact.     No Known Problems Daughter         Not in contact.     Social History     Socioeconomic History     Marital status:      Spouse name: Not on file     Number of children: Not on file     Years of education: Not on file     Highest education level: Not on file   Occupational History     Not on file   Tobacco Use     Smoking status: Heavy Tobacco Smoker     Packs/day: 0.50     Types: Cigarettes     Smokeless tobacco: Never Used     Tobacco comment: per day   Substance and Sexual Activity     Alcohol use: No     Drug use: No     Sexual activity: Not on file   Other Topics Concern     Not on file   Social History Narrative    Lives with family      Social Determinants of Health     Financial Resource Strain:      Difficulty of Paying Living Expenses:    Food Insecurity:      Worried About Running Out of Food in the Last Year:      Ran Out of Food in the Last Year:    Transportation Needs:      Lack of Transportation (Medical):      Lack of Transportation (Non-Medical):    Physical Activity:      Days of Exercise per Week:      Minutes of Exercise per Session:    Stress:      Feeling of Stress :    Social Connections:      Frequency of Communication with Friends and Family:      Frequency of Social Gatherings with Friends and Family:      Attends L[employer] Services:      Active Member of Clubs or Organizations:      Attends Club or Organization Meetings:       Marital Status:    Intimate Partner Violence:      Fear of Current or Ex-Partner:      Emotionally Abused:      Physically Abused:      Sexually Abused:        Current Outpatient Medications   Medication Sig Dispense Refill     hydrOXYzine pamoate (VISTARIL) 25 MG capsule [HYDROXYZINE PAMOATE (VISTARIL) 25 MG CAPSULE] Take 1 capsule (25 mg total) by mouth 3 (three) times a day as needed for itching. 50 capsule 0     oxyCODONE (ROXICODONE) 5 MG tablet Take 1 tablet (5 mg) by mouth every 4 hours as needed for pain 10 tablet 0     acetaminophen (TYLENOL) 325 MG tablet [ACETAMINOPHEN (TYLENOL) 325 MG TABLET] Take 3 tablets (975 mg total) by mouth every 8 (eight) hours. 90 tablet 0     aspirin 81 mg chewable tablet [ASPIRIN 81 MG CHEWABLE TABLET] Chew 1 tablet (81 mg total) 2 (two) times a day. 60 tablet 0     escitalopram oxalate (LEXAPRO) 20 MG tablet [ESCITALOPRAM OXALATE (LEXAPRO) 20 MG TABLET] Take 20 mg by mouth daily.        ferrous sulfate 325 (65 FE) MG tablet [FERROUS SULFATE 325 (65 FE) MG TABLET] Take 1 tablet by mouth 2 (two) times a day.        potassium chloride (KLOR-CON) 10 MEQ CR tablet [POTASSIUM CHLORIDE (KLOR-CON) 10 MEQ CR TABLET] Take 10 mEq by mouth 2 (two) times a day.        senna-docusate (SENNOSIDES-DOCUSATE SODIUM) 8.6-50 mg tablet [SENNA-DOCUSATE (SENNOSIDES-DOCUSATE SODIUM) 8.6-50 MG TABLET] Take 1-2 tablets by mouth 2 times a day at 6:00 am and 4:00 pm. 50 tablet 0     torsemide (DEMADEX) 20 MG tablet [TORSEMIDE (DEMADEX) 20 MG TABLET] Take 20 mg by mouth 2 (two) times a day.       vitamin B-12 (CYANOCOBALAMIN) 1000 MCG tablet Take 1,000 mcg by mouth daily       Allergies   Allergen Reactions     Ibuprofen Rash     Tolerated 15mg IV toradol on 5/18/19, no rash     Immunization History   Administered Date(s) Administered     COVID-Sterling,PF,Moderna 02/18/2021, 03/18/2021       Review of Systems   Patient denies fever, chills, headache, lightheadedness, dizziness, rhinorrhea, cough, congestion,  "shortness of breath, chest pain, palpitations, abdominal pain, n/v, diarrhea, constipation, change in appetite, change in sleep pattern, dysuria, frequency, burning or pain with urination.  Other than stated in HPI all other review of systems is negative.         Physical Exam   Vital signs:BP (!) 174/86   Pulse 81   Temp 97.8  F (36.6  C)   Resp 18   Ht 1.676 m (5' 6\")   Wt 72.3 kg (159 lb 6.4 oz)   SpO2 98%   BMI 25.73 kg/m     GENERAL APPEARANCE: Well developed, well nourished, in no acute distress.  HEENT: normocephalic, atraumatic  sclerae anicteric, conjunctivae clear and moist, EOM intact  LUNGS: Lung sounds CTA, inspiratory wheeze in left LL, respiratory effort normal.  CARD: RRR, S1, S2, without murmurs, gallops, rubs,   ABD: Soft, nondistended and nontender with normal bowel sounds.   MSK: Muscle strength and tone were equal bilaterally. Moves all extremities easily and intentionally.   EXTREMITIES: soft trace BLE edema, good cms  NEURO: Alert, mild cognitive impairment, Face is symmetric.  SKIN: incision is well approximated without s/s of infection  PSYCH: euthymic            Labs:    Recent Results (from the past 240 hour(s))   Basic metabolic panel    Collection Time: 07/10/21  8:35 AM   Result Value Ref Range    Sodium 136 136 - 145 mmol/L    Potassium 4.3 3.5 - 5.0 mmol/L    Chloride 108 (H) 98 - 107 mmol/L    Carbon Dioxide (CO2) 20 (L) 22 - 31 mmol/L    Anion Gap 8 5 - 18 mmol/L    Glucose 120 70 - 125 mg/dL    Calcium 8.6 8.5 - 10.5 mg/dL    Urea Nitrogen 29 (H) 8 - 28 mg/dL    Creatinine 1.92 (H) 0.70 - 1.30 mg/dL    GFR Estimate If Black 40 (L) >60 mL/min/1.73m2    GFR Estimate 33 (L) >60 mL/min/1.73m2   Hemoglobin    Collection Time: 07/10/21  8:35 AM   Result Value Ref Range    Hemoglobin 7.7 (L) 14.0 - 18.0 g/dL   Occult blood stool    Collection Time: 07/10/21  9:45 PM   Result Value Ref Range    Occult Blood Negative Negative   CBC with platelets    Collection Time: 07/11/21  8:54 AM "   Result Value Ref Range    WBC Count 7.9 4.0 - 11.0 10e3/uL    RBC Count 2.48 (L) 4.40 - 5.90 10e6/uL    Hemoglobin 7.9 (L) 13.3 - 17.7 g/dL    Hematocrit 24.7 (L) 40.0 - 53.0 %     78 - 100 fL    MCH 31.9 26.5 - 33.0 pg    MCHC 32.0 31.5 - 36.5 g/dL    RDW 13.8 10.0 - 15.0 %    Platelet Count 287 150 - 450 10e3/uL   Magnesium    Collection Time: 07/11/21  8:54 AM   Result Value Ref Range    Magnesium 2.1 1.8 - 2.6 mg/dL   UA reflex to Microscopic    Collection Time: 07/12/21  3:25 AM   Result Value Ref Range    Color Urine Colorless Colorless, Straw, Light Yellow, Yellow    Appearance Urine Clear Clear    Glucose Urine Negative Negative mg/dL    Bilirubin Urine Negative Negative    Ketones Urine Negative Negative mg/dL    Specific Gravity Urine 1.010 1.001 - 1.030    Blood Urine Negative Negative    pH Urine 5.0 5.0 - 7.0    Protein Albumin Urine Negative Negative mg/dL    Urobilinogen Urine <2.0 <2.0 mg/dL    Nitrite Urine Negative Negative    Leukocyte Esterase Urine Negative Negative   Hemoglobin    Collection Time: 07/12/21  5:52 AM   Result Value Ref Range    Hemoglobin 7.2 (L) 13.3 - 17.7 g/dL   Extra Red Top Tube    Collection Time: 07/12/21  5:52 AM   Result Value Ref Range    Hold Specimen [facility]    Extra Green Top (Lithium Heparin) Tube    Collection Time: 07/12/21  5:52 AM   Result Value Ref Range    Hold Specimen [facility]    SARS-COV2 (COVID-19) Virus RT-PCR    Collection Time: 07/13/21  6:35 PM    Specimen: Nasopharyngeal; Swab   Result Value Ref Range    SARS CoV2 PCR Negative Negative         Assessment:  Status post total left knee replacement  Follow up with ortho in 2 weeks, continue on ASA BID.  Continue with scheduled hydroxyzine and Apap and prn oxycodone    Stage 3a chronic kidney disease  Avoid nephrotoxins     Chronic obstructive pulmonary disease, unspecified COPD type (H)  Wheezing, albuterol inhaler TID x 5 days then change to prn.     Acute metabolic encephalopathy  Resolving     Anemia  due to blood loss, acute  Will need hgb in 2 weeks.          Electronically signed by: Nicole Pace NP

## 2021-07-19 NOTE — LETTER
7/19/2021        RE: Dread Justice  [address]  [place] [address]        Code Status:  FULL CODE  Visit Type: RECHECK     Facility:   [facility] ([facility]) [[id number]]        History of Present Illness:   Hospital Admission Date: 7/7/2021 Hospital Discharge Date: 7/13/2021      Dread Justice is a 86 year old male with a past medical history for CVA, COPD (smoker), OA and macular degeneration.  She was recently hospitalized for planned left TKA 2/2 OA.  Discharge hgb 7.2. Orthopedics discharge states he had no postoperative issues.  He was discharged to TCU with Cefadroxil x 7 days.     Today, he reports his pain is 6-7/10 however is vague at what his pain really is.  When asked what acceptable level of pain would be he states 5/10.  Nursing reports that he is requiring lots of convincing to take his pain medications even when he is in visible pain.  He reports pain is mostly behind leg in quadricep area.     He does have an inspiratory wheeze during exam however denies SOB.      BPS recorded yesterday was elevated SBP 170s however his BP is not being recorded daily and all other Bps are acceptable.     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  MRI showed a 4.5 cm infarct in the left cerebellum.     Chronic headaches      COPD (chronic obstructive pulmonary disease) (H)      Depression      Iron deficiency anemia 10/03/2012     Macular degeneration, bilateral      Osteoarthritis      Peptic ulcer disease 1975    Secondary to alcohol use.     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  Diverticulosis, internal hemorrhoids.     ESOPHAGOSCOPY, GASTROSCOPY, DUODENOSCOPY (EGD), COMBINED  10/04/2012    Chronic gastritis.     INGUINAL HERNIA REPAIR Right 7/8/2014    Procedure: HERNIA REPAIR INGUINAL, RIGHT;  Surgeon: Mack Kowalski MD;  Location: [facility];  Service:      PICC  12/2/2014          TONSILLECTOMY  1949     Family History   Problem Relation Age of Onset     Heart Failure Mother 93.00     Cerebrovascular Disease Father 79.00     Coronary Artery Disease Father      Cancer Sister 76.00     No Known Problems Son         Not in contact.     No Known Problems Daughter         Not in contact.     Social History     Socioeconomic History     Marital status:      Spouse name: Not on file     Number of children: Not on file     Years of education: Not on file     Highest education level: Not on file   Occupational History     Not on file   Tobacco Use     Smoking status: Heavy Tobacco Smoker     Packs/day: 0.50     Types: Cigarettes     Smokeless tobacco: Never Used     Tobacco comment: per day   Substance and Sexual Activity     Alcohol use: No     Drug use: No     Sexual activity: Not on file   Other Topics Concern     Not on file   Social History Narrative    Lives with family      Social Determinants of Health     Financial Resource Strain:      Difficulty of Paying Living Expenses:    Food Insecurity:      Worried About Running Out of Food in the Last Year:      Ran Out of Food in the Last Year:    Transportation Needs:      Lack of Transportation (Medical):      Lack of Transportation (Non-Medical):    Physical Activity:      Days of Exercise per Week:      Minutes of Exercise per Session:    Stress:      Feeling of Stress :    Social Connections:      Frequency of Communication with Friends and Family:      Frequency of Social Gatherings with Friends and Family:      Attends Mandaen Services:       Active Member of Clubs or Organizations:      Attends Club or Organization Meetings:      Marital Status:    Intimate Partner Violence:      Fear of Current or Ex-Partner:      Emotionally Abused:      Physically Abused:      Sexually Abused:        Current Outpatient Medications   Medication Sig Dispense Refill     hydrOXYzine pamoate (VISTARIL) 25 MG capsule [HYDROXYZINE PAMOATE (VISTARIL) 25 MG CAPSULE] Take 1 capsule (25 mg total) by mouth 3 (three) times a day as needed for itching. 50 capsule 0     oxyCODONE (ROXICODONE) 5 MG tablet Take 1 tablet (5 mg) by mouth every 4 hours as needed for pain 10 tablet 0     acetaminophen (TYLENOL) 325 MG tablet [ACETAMINOPHEN (TYLENOL) 325 MG TABLET] Take 3 tablets (975 mg total) by mouth every 8 (eight) hours. 90 tablet 0     aspirin 81 mg chewable tablet [ASPIRIN 81 MG CHEWABLE TABLET] Chew 1 tablet (81 mg total) 2 (two) times a day. 60 tablet 0     escitalopram oxalate (LEXAPRO) 20 MG tablet [ESCITALOPRAM OXALATE (LEXAPRO) 20 MG TABLET] Take 20 mg by mouth daily.        ferrous sulfate 325 (65 FE) MG tablet [FERROUS SULFATE 325 (65 FE) MG TABLET] Take 1 tablet by mouth 2 (two) times a day.        potassium chloride (KLOR-CON) 10 MEQ CR tablet [POTASSIUM CHLORIDE (KLOR-CON) 10 MEQ CR TABLET] Take 10 mEq by mouth 2 (two) times a day.        senna-docusate (SENNOSIDES-DOCUSATE SODIUM) 8.6-50 mg tablet [SENNA-DOCUSATE (SENNOSIDES-DOCUSATE SODIUM) 8.6-50 MG TABLET] Take 1-2 tablets by mouth 2 times a day at 6:00 am and 4:00 pm. 50 tablet 0     torsemide (DEMADEX) 20 MG tablet [TORSEMIDE (DEMADEX) 20 MG TABLET] Take 20 mg by mouth 2 (two) times a day.       vitamin B-12 (CYANOCOBALAMIN) 1000 MCG tablet Take 1,000 mcg by mouth daily       Allergies   Allergen Reactions     Ibuprofen Rash     Tolerated 15mg IV toradol on 5/18/19, no rash     Immunization History   Administered Date(s) Administered     COVID-19,PF,Moderna 02/18/2021, 03/18/2021       Review of Systems   Patient  "denies fever, chills, headache, lightheadedness, dizziness, rhinorrhea, cough, congestion, shortness of breath, chest pain, palpitations, abdominal pain, n/v, diarrhea, constipation, change in appetite, change in sleep pattern, dysuria, frequency, burning or pain with urination.  Other than stated in HPI all other review of systems is negative.         Physical Exam   Vital signs:BP (!) 174/86   Pulse 81   Temp 97.8  F (36.6  C)   Resp 18   Ht 1.676 m (5' 6\")   Wt 72.3 kg (159 lb 6.4 oz)   SpO2 98%   BMI 25.73 kg/m     GENERAL APPEARANCE: Well developed, well nourished, in no acute distress.  HEENT: normocephalic, atraumatic  sclerae anicteric, conjunctivae clear and moist, EOM intact  LUNGS: Lung sounds CTA, inspiratory wheeze in left LL, respiratory effort normal.  CARD: RRR, S1, S2, without murmurs, gallops, rubs,   ABD: Soft, nondistended and nontender with normal bowel sounds.   MSK: Muscle strength and tone were equal bilaterally. Moves all extremities easily and intentionally.   EXTREMITIES: soft trace BLE edema, good cms  NEURO: Alert, mild cognitive impairment, Face is symmetric.  SKIN: incision is well approximated without s/s of infection  PSYCH: euthymic            Labs:    Recent Results (from the past 240 hour(s))   Basic metabolic panel    Collection Time: 07/10/21  8:35 AM   Result Value Ref Range    Sodium 136 136 - 145 mmol/L    Potassium 4.3 3.5 - 5.0 mmol/L    Chloride 108 (H) 98 - 107 mmol/L    Carbon Dioxide (CO2) 20 (L) 22 - 31 mmol/L    Anion Gap 8 5 - 18 mmol/L    Glucose 120 70 - 125 mg/dL    Calcium 8.6 8.5 - 10.5 mg/dL    Urea Nitrogen 29 (H) 8 - 28 mg/dL    Creatinine 1.92 (H) 0.70 - 1.30 mg/dL    GFR Estimate If Black 40 (L) >60 mL/min/1.73m2    GFR Estimate 33 (L) >60 mL/min/1.73m2   Hemoglobin    Collection Time: 07/10/21  8:35 AM   Result Value Ref Range    Hemoglobin 7.7 (L) 14.0 - 18.0 g/dL   Occult blood stool    Collection Time: 07/10/21  9:45 PM   Result Value Ref Range "    Occult Blood Negative Negative   CBC with platelets    Collection Time: 07/11/21  8:54 AM   Result Value Ref Range    WBC Count 7.9 4.0 - 11.0 10e3/uL    RBC Count 2.48 (L) 4.40 - 5.90 10e6/uL    Hemoglobin 7.9 (L) 13.3 - 17.7 g/dL    Hematocrit 24.7 (L) 40.0 - 53.0 %     78 - 100 fL    MCH 31.9 26.5 - 33.0 pg    MCHC 32.0 31.5 - 36.5 g/dL    RDW 13.8 10.0 - 15.0 %    Platelet Count 287 150 - 450 10e3/uL   Magnesium    Collection Time: 07/11/21  8:54 AM   Result Value Ref Range    Magnesium 2.1 1.8 - 2.6 mg/dL   UA reflex to Microscopic    Collection Time: 07/12/21  3:25 AM   Result Value Ref Range    Color Urine Colorless Colorless, Straw, Light Yellow, Yellow    Appearance Urine Clear Clear    Glucose Urine Negative Negative mg/dL    Bilirubin Urine Negative Negative    Ketones Urine Negative Negative mg/dL    Specific Gravity Urine 1.010 1.001 - 1.030    Blood Urine Negative Negative    pH Urine 5.0 5.0 - 7.0    Protein Albumin Urine Negative Negative mg/dL    Urobilinogen Urine <2.0 <2.0 mg/dL    Nitrite Urine Negative Negative    Leukocyte Esterase Urine Negative Negative   Hemoglobin    Collection Time: 07/12/21  5:52 AM   Result Value Ref Range    Hemoglobin 7.2 (L) 13.3 - 17.7 g/dL   Extra Red Top Tube    Collection Time: 07/12/21  5:52 AM   Result Value Ref Range    Hold Specimen [facility]    Extra Green Top (Lithium Heparin) Tube    Collection Time: 07/12/21  5:52 AM   Result Value Ref Range    Hold Specimen [facility]    SARS-COV2 (COVID-19) Virus RT-PCR    Collection Time: 07/13/21  6:35 PM    Specimen: Nasopharyngeal; Swab   Result Value Ref Range    SARS CoV2 PCR Negative Negative         Assessment:  Status post total left knee replacement  Follow up with ortho in 2 weeks, continue on ASA BID.  Continue with scheduled hydroxyzine and Apap and prn oxycodone    Stage 3a chronic kidney disease  Avoid nephrotoxins     Chronic obstructive pulmonary disease, unspecified COPD type (H)  Wheezing, albuterol  inhaler TID x 5 days then change to prn.     Acute metabolic encephalopathy  Resolving     Anemia due to blood loss, acute  Will need hgb in 2 weeks.          Electronically signed by: Nicole Pace NP          Sincerely,        Nicole Pace, NP

## 2021-07-21 NOTE — TELEPHONE ENCOUNTER
[facility] Nurse Triage Telephone Note    Provider: Kiarra Trejo MD  Facility: [facility] Facility Type:  TCU    Caller: Marcela  Call Back Number: [phone]    Allergies:    Allergies   Allergen Reactions     Aspirin      Other reaction(s): unknown     Ibuprofen Rash     Tolerated 15mg IV toradol on 5/18/19, no rash        Reason for call: Nurse calling to report Heme 2, BMP, and Mg levels.  Of note, patient may be discharging to another TCU soon.  Notable meds:  ASA 81mg BID, Cefadroxil ended yesterday, ferrous sulfate 325mg BID, potassium 10meq BID, Torsemide 20mg BID.      Verbal Order/Direction given by Provider: Guiac stools x 3.  Daily weights.  Decrease Torsemide to 20mg daily.  Check Heme 2 and BMP on 7/23/21.      Provider giving Order:  Kiarra Trejo MD    Verbal Order given to: Marcela Hale RN
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
## 2021-07-22 NOTE — PROGRESS NOTES
[facility] GERIATRIC SERVICES    Facility:  [facility] ([facility]) [[id number]]  Code Status: UNKNOWN      CHIEF COMPLAINT/REASON FOR VISIT:  Chief Complaint   Patient presents with     Hospital F/U       HPI:   Dread is a 86 year old male who who arrived today at the [facility] TCU to undergo physical and occupational therapy and a ride from [facility].  He was recently admitted to the hospital on 7/7/2021 with osteoarthritis of the left knee in which he went knee arthroplasty.  This is done secondary degenerative joint disease refractory to conservative management.  His anticoagulation postoperatively was from aspirin and no signs of any clots.  And pain implementation was not appropriately.  He then transferred to [facility].    Currently transferred to [facility] with preference from [facility] unknown reason.  He does command there was no listed complications from his surgery complications over there but he did have a couple of falls.  He does have some bruising at this time his hemoglobin on 712 was 7.2 on 711 it was 7.9 and on 714 was 10.4.  1 is pending for Monday.  Platelet counts were all within normal limits.    Patient is sitting his chair company is not appear to be in acute distress is very hard to solicit pain with this time but he does have diarrhea for about 3 months.  He is on a stool softener at this time and he has not been worked up for his diarrhea he goes several times a day he claims however no signs of any C. difficile.    Vitals will be taken today and not available to put in chart at this time.  Patient was treated properly and transferred here to the TCU at [facility] in stable condition.    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  MRI showed a 4.5 cm infarct in the left cerebellum.      Chronic headaches      COPD (chronic obstructive pulmonary disease) (H)      Depression      Iron deficiency anemia 10/03/2012     Macular degeneration, bilateral      Osteoarthritis      Peptic ulcer disease 1975    Secondary to alcohol use.     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  Diverticulosis, internal hemorrhoids.     ESOPHAGOSCOPY, GASTROSCOPY, DUODENOSCOPY (EGD), COMBINED  10/04/2012    Chronic gastritis.     INGUINAL HERNIA REPAIR Right 7/8/2014    Procedure: HERNIA REPAIR INGUINAL, RIGHT;  Surgeon: Mack Kowalski MD;  Location: [facility];  Service:      PICC  12/2/2014          TONSILLECTOMY  1949       Family History:   Family History   Problem Relation Age of Onset     Heart Failure Mother 93.00     Cerebrovascular Disease Father 79.00     Coronary Artery Disease Father      Cancer Sister 76.00     No Known Problems Son         Not in contact.     No Known Problems Daughter         Not in contact.       Social History:    Social History     Socioeconomic History     Marital status:      Spouse name: Not on file     Number of children: Not on file     Years of education: Not on file     Highest education level: Not on file   Occupational History     Not on file   Tobacco Use     Smoking status: Heavy Tobacco Smoker     Packs/day: 0.50     Types: Cigarettes     Smokeless tobacco: Never Used     Tobacco comment: per day   Substance and Sexual Activity     Alcohol use: No     Drug use: No     Sexual activity: Not on file   Other Topics Concern     Not on file   Social History Narrative     Lives with family      Social Determinants of Health     Financial Resource Strain:      Difficulty of Paying Living Expenses:    Food Insecurity:      Worried About Running Out of Food in the Last Year:      Ran Out of Food in the Last Year:    Transportation Needs:      Lack of Transportation (Medical):      Lack of Transportation (Non-Medical):    Physical Activity:      Days of Exercise per Week:      Minutes of Exercise per Session:    Stress:      Feeling of Stress :    Social Connections:      Frequency of Communication with Friends and Family:      Frequency of Social Gatherings with Friends and Family:      Attends [employer] Services:      Active Member of Clubs or Organizations:      Attends Club or Organization Meetings:      Marital Status:    Intimate Partner Violence:      Fear of Current or Ex-Partner:      Emotionally Abused:      Physically Abused:      Sexually Abused:        REVIEW OF SYSTEM: Pain is appropriately managed at this time.  On the current pain regime.  He denies any fevers chills nausea vomiting.  He does have diarrhea but denies any chest pain shortness of breath change in vision hearing taste or smell weakness one-sided other.  He has no depression or anxiety and is urinating without difficulty.  The remainder review of systems is negative.      PHYSICAL EXAM: Patient is alert pleasant does not appear to be in acute distress head was normocephalic and atraumatic sclera conjunctiva was clear.  Nose without discharge.  And oromucosa was moist.  Heart sounds are regular lungs clear abdomen was soft nontender.  Extremities show trace edema bilateral.  Affect was pleasant neuro exam was nonfocal.        LABS:        ASSESSMENT:    Encounter Diagnoses   Name Primary?     Status post total left knee replacement Yes     Acute metabolic encephalopathy      Stage 3a chronic kidney disease      Chronic obstructive pulmonary disease, unspecified COPD type (H)      Normochromic normocytic anemia          PLAN: Plan at this time we will get labs from the hospital and from [facility].  Hemoglobin Monday second postoperative anemia and will check weights daily.  I will hold the senna secondary because of that that the diarrhea and will monitor bowel movements daily.  And I will be notified of any change.  I will revisit him on Monday at this time he is currently has an appointment at this time with physical and Occupational Therapy for admit.    I will continue to monitor above medical problems and no other changes to care plan at this time.        Electronically signed by: LORI REYNAGA DO

## 2021-07-23 PROBLEM — D64.9 CHRONIC ANEMIA: Status: ACTIVE | Noted: 2021-01-01

## 2021-07-23 PROBLEM — M25.562 PAIN AND SWELLING OF LEFT KNEE: Status: ACTIVE | Noted: 2021-01-01

## 2021-07-23 PROBLEM — J18.9 PNEUMONIA OF LEFT LOWER LOBE DUE TO INFECTIOUS ORGANISM: Status: ACTIVE | Noted: 2021-01-01

## 2021-07-23 PROBLEM — M25.462 PAIN AND SWELLING OF LEFT KNEE: Status: ACTIVE | Noted: 2021-01-01

## 2021-07-23 NOTE — TELEPHONE ENCOUNTER
[facility] Nurse Triage Telephone Note    Provider: Jared Jose DO  Facility: [facility] Facility Type:  TCU    Caller: Blessing  Call Back Number: [phone]    Allergies:    Allergies   Allergen Reactions     Aspirin      Other reaction(s): unknown     Ibuprofen Rash     Tolerated 15mg IV toradol on 5/18/19, no rash        Reason for call: Nurse calling to report Heme 2 and BMP results.  Notable meds:  ASA 81mg BID, ferrous sulfate 325mg BID, potassium 10meq BID, Torsemide 20mg BID.  Patient has had 2 positive guiacs.  VS:  T=98.1, P=75, R=16, QA=775/42, O2=95%RA.       Verbal Order/Direction given by Provider: Send to ER due to severe anemia, 2 positive guiacs, and possible TKA infection.      Provider giving Order:  Jared Jose DO    Verbal Order given to: Blessing Hale RN

## 2021-07-23 NOTE — CONSULTS
Care Management Initial Consult    General Information  Assessment completed with: Patient, Friend, Josie  Type of CM/SW Visit: Initial Assessment    Primary Care Provider verified and updated as needed: Yes   Readmission within the last 30 days: current reason for admission unrelated to previous admission   Return Category: Exacerbation of disease  Reason for Consult: discharge planning  Advance Care Planning: Advance Care Planning Reviewed: no concerns identified          Communication Assessment  Patient's communication style: spoken language (English or Bilingual)    Hearing Difficulty or Deaf: no   Wear Glasses or Blind: yes    Cognitive  Cognitive/Neuro/Behavioral: WDL                      Living Environment:   People in home: alone     Current living Arrangements: extended care facility      Able to return to prior arrangements: yes       Family/Social Support:  Care provided by: self  Provides care for: no one, unable/limited ability to care for self  Marital Status: Single  Other (specify) (Friend)          Description of Support System: Supportive, Involved    Support Assessment: Adequate social supports    Current Resources:   Patient receiving home care services: No     Community Resources: None  Equipment currently used at home: walker, rolling  Supplies currently used at home: Incontinence Supplies    Employment/Financial:  Employment Status: retired        Financial Concerns:     Referral to Financial Counselor: No       Lifestyle & Psychosocial Needs:  Social Determinants of Health     Tobacco Use:      Smoking Tobacco Use:      Smokeless Tobacco Use:    Alcohol Use:      Frequency of Alcohol Consumption:      Average Number of Drinks:      Frequency of Binge Drinking:    Financial Resource Strain:      Difficulty of Paying Living Expenses:    Food Insecurity:      Worried About Running Out of Food in the Last Year:      Ran Out of Food in the Last Year:    Transportation Needs:      Lack of  "Transportation (Medical):      Lack of Transportation (Non-Medical):    Physical Activity:      Days of Exercise per Week:      Minutes of Exercise per Session:    Stress:      Feeling of Stress :    Social Connections:      Frequency of Communication with Friends and Family:      Frequency of Social Gatherings with Friends and Family:      Attends Zoroastrian Services:      Active Member of Clubs or Organizations:      Attends Club or Organization Meetings:      Marital Status:    Intimate Partner Violence:      Fear of Current or Ex-Partner:      Emotionally Abused:      Physically Abused:      Sexually Abused:    Depression:      PHQ-2 Score:    Housing Stability:      Unable to Pay for Housing in the Last Year:      Number of Places Lived in the Last Year:      Unstable Housing in the Last Year:        Functional Status:  Prior to admission patient needed assistance:   Dependent ADLs:: Independent  Dependent IADLs:: Transportation, Money Management, Medication Management, Shopping  Assesssment of Functional Status: Not at baseline with ADL Functioning    Mental Health Status:  Mental Health Status: No Current Concerns       Chemical Dependency Status:  Chemical Dependency Status: No Current Concerns             Values/Beliefs:  Spiritual, Cultural Beliefs, Zoroastrian Practices, Values that affect care: no               Additional Information:  MELANI assessed. Pt was admitted at [facility] from 7/7-7/13 and had his left knee replaced. Pt is agitated upon approach, saying that he needs to get the police involved. He seems confused and thinks that he is still at [facility]. When notified that he is at [facility], the pt calmed and said \"that's good, that's what I requested\". He then made a comment about being admitted permanently to [facility], which CM advised is not possible.     Pt reports prior to his knee replacement earlier this month he lived alone in his apartment and is independent with ADLs and " uses a 4ww for mobility. Pt's friend Moi manages IADLs and paperwork as pt only has 30% of his sight. [facility] spoke with Moi over the phone who reports pt does have some confusion at baseline, and this has been exacerbated since his knee replacement. Moi confirms what pt has stated about his baseline level of function. Discharge goal to return to TCU, transportation pending.    STEPHANIE Masterson

## 2021-07-23 NOTE — ED TRIAGE NOTES
Pt arrived via EMS from TCU where he was recovering from recent total left knee replacement. Per EMS, TCU called to have patient brought to ED d/t low Hgb - 7.0. EMS states pt has had 2 positive blood cultures as well as a known GI bleed. EMS states en route pt complained of severe knee pain.

## 2021-07-23 NOTE — ED PROVIDER NOTES
EMERGENCY DEPARTMENT ENCOUNTER      NAME: Dread Justice  AGE: 86 year old male  YOB: 1934  MRN: [id number]  EVALUATION DATE & TIME: 2021  2:29 PM    PCP: No primary care provider on file.    ED PROVIDER: Jennifer Houser D.O.      CHIEF COMPLAINT:  Chief Complaint   Patient presents with     Knee Pain         FINAL IMPRESSION:  1. Pneumonia of left lower lobe due to infectious organism    2. Chronic anemia    3. Pain and swelling of left knee          ED COURSE & MEDICAL DECISION MAKIN year old male with history of anemia and COPD who underwent total knee arthroplasty on 2021 presented to the ED for evaluation after he was noted to have positive blood cultures, anemia, and persistent left knee pain.  Reportedly the patient's hemoglobin earlier today was 7.0.  Here in the ED the patient reports persistent left knee pain.  However, he denies any worsening left knee pain.  He reports experiencing subjective fevers and a mild cough.  He denied any shortness of breath, chest pain, abdominal pain, or any nausea or vomiting.  Upon arrival to the ED the patient was noted to be hemodynamically stable.  He did not appear to be in any obvious distress or discomfort and he was not acutely ill appearing.  On exam the patient was noted to have mild to moderate erythema, and tenderness palpation located on the inferior aspect of the left knee incision.  There was no crepitus or fluctuance noted.      Laboratory test show a hemoglobin of 7.1.  This appears to be the patient's baseline hemoglobin.  The creatinine was 2.0 which again appears to be his baseline.  The CBC and BMP were otherwise reassuring.  The CRP was elevated at 4.8.  The chest x-ray reveals interstitial and alveolar infiltrates in the left lower lobe consistent with pneumonia.  The outside blood cultures were not available to review.  Blood cultures were repeated here in the ED.  Patient presents her on Zosyn to treat a hospital-acquired  pneumonia.    X-ray of the left knee shows extensive swelling anterior to the knee and the subcutaneous tissue.  Ultrasound of the left leg did not show evidence of a DVT.  Based upon the patient's complaint of persistent knee pain and exam findings it is quite likely that he is developing a cellulitis.  The patient's case was discussed with the on-call orthopedic physician.  The patient will be evaluated in a.m. by orthopedics.       The patient was admitted for further evaluation treatment of the pneumonia and possible cellulitis after discussing the case with the hospitalist.    3:31 PM I met with the patient for the initial interview and physical examination. Discussed plan for treatment and workup in the ED.   5:08 PM I discussed the patient with Dr. Monika Huitron, hospitalist, who agrees to accept the patient.     At the conclusion of the encounter I discussed the results of all of the tests and the disposition. The questions were answered. The patient or family acknowledged understanding and was agreeable with the care plan.     PPE: Provider wore gloves, N95 mask, eye protection, surgical cap, and paper mask.  MEDICATIONS GIVEN IN THE EMERGENCY:  Medications   piperacillin-tazobactam (ZOSYN) 3.375 g vial to attach to  mL bag (has no administration in time range)   doxycycline (VIBRAMYCIN) 100 mg vial to attach to  mL bag (has no administration in time range)   lidocaine 1 % 0.1-1 mL (has no administration in time range)   lidocaine (LMX4) cream (has no administration in time range)   sodium chloride (PF) 0.9% PF flush 3 mL (has no administration in time range)   sodium chloride (PF) 0.9% PF flush 3 mL (has no administration in time range)   melatonin tablet 1 mg (has no administration in time range)   albuterol (PROAIR HFA/PROVENTIL HFA/VENTOLIN HFA) 108 (90 Base) MCG/ACT inhaler 2 puff (has no administration in time range)   B-12 TABS 1,000 mcg (has no administration in time range)    escitalopram (LEXAPRO) tablet 20 mg (has no administration in time range)   aspirin (ASA) chewable tablet 81 mg (has no administration in time range)   acetaminophen (TYLENOL) tablet 975 mg (has no administration in time range)   hydrOXYzine (ATARAX) tablet 25 mg (has no administration in time range)   oxyCODONE (ROXICODONE) tablet 5-10 mg (has no administration in time range)   naloxone (NARCAN) injection 0.2 mg (has no administration in time range)     Or   naloxone (NARCAN) injection 0.4 mg (has no administration in time range)     Or   naloxone (NARCAN) injection 0.2 mg (has no administration in time range)     Or   naloxone (NARCAN) injection 0.4 mg (has no administration in time range)   piperacillin-tazobactam (ZOSYN) 3.375 g vial to attach to  mL bag (3.375 g Intravenous New Bag 7/23/21 1808)       NEW PRESCRIPTIONS STARTED AT TODAY'S ER VISIT:  Current Discharge Medication List             =================================================================    HPI  Dread Justice is a 86 year old male with no recorded medical history who presents to the ED via EMS for evaluation of knee pain.    Patient reports that he had knee surgery ~2 weeks ago, and that the rehab facility he is currently recovering at sent him to the ED due to anemia and positive blood cultures detected this morning. Patient also reports that his knee pain has not improved since his operation and believes it may possibly be infected. Patient endorses redness at the site of the incision that extends down the lower extremity. Patient notes frequent urge to urinate but is unsure of other urinary symptoms. He believes he may have had intermittent fevers but denies any current fever. Patient endorses a slight cough but believes it is unrelated to his other symptoms. Patient endorses a history of anemia but denies any history of receiving blood transfusions. Patient denies chills, nausea, vomiting, chest pain, shortness of breath, abdominal  pain, and any other symptoms or complaints at this time.     I, Justyna Clay, am serving as a scribe to document services personally performed by Dr. Jennifer Houser DO, based on my observation and the provider's statements to me. I, Dr. Jennifer Houser DO attest that Justynapatrick Williamson is acting in a scribe capacity, has observed my performance of the services and has documented them in accordance with my direction.      REVIEW OF SYSTEMS   Constitutional: Denies chills, unintentional weight loss or fatigue Positive for subjective fever.   Eyes: Denies visual changes or discharge    HENT: Denies sore throat, ear pain or neck pain  Respiratory: Denies cough or shortness of breath    Cardiovascular: Denies chest pain, palpitations or leg swelling   GI: Denies abdominal pain, nausea, vomiting, or dark, bloody stools.    : Denies hematuria, dysuria, or flank pain. Positive for urgency  Musculoskeletal: Denies any new back pain. Positive for left knee pain.   Skin: Denies rash or wound. Positive for erythema on left leg.  Neurologic: Denies current headache, new weakness, focal weakness, or sensory changes    Lymphatic: Denies swollen glands    Psychiatric: Denies depression, suicidal ideation or homicidal ideation.    Remainder of systems reviewed, unless noted in HPI all others negative.      PAST MEDICAL HISTORY:  No past medical history on file.    PAST SURGICAL HISTORY:  No past surgical history on file.        CURRENT MEDICATIONS:    [unfilled]    ALLERGIES:  Allergies   Allergen Reactions     Ibuprofen Unknown       FAMILY HISTORY:  No family history on file.    SOCIAL HISTORY:   Social History     Socioeconomic History     Marital status: Not on file     Spouse name: Not on file     Number of children: Not on file     Years of education: Not on file     Highest education level: Not on file   Occupational History     Not on file   Tobacco Use     Smoking status: Not on file   Substance and Sexual Activity     Alcohol  use: Not on file     Drug use: Not on file     Sexual activity: Not on file   Other Topics Concern     Not on file   Social History Narrative     Not on file     Social Determinants of Health     Financial Resource Strain:      Difficulty of Paying Living Expenses:    Food Insecurity:      Worried About Running Out of Food in the Last Year:      Ran Out of Food in the Last Year:    Transportation Needs:      Lack of Transportation (Medical):      Lack of Transportation (Non-Medical):    Physical Activity:      Days of Exercise per Week:      Minutes of Exercise per Session:    Stress:      Feeling of Stress :    Social Connections:      Frequency of Communication with Friends and Family:      Frequency of Social Gatherings with Friends and Family:      Attends Rastafari Services:      Active Member of Clubs or Organizations:      Attends Club or Organization Meetings:      Marital Status:    Intimate Partner Violence:      Fear of Current or Ex-Partner:      Emotionally Abused:      Physically Abused:      Sexually Abused:        PHYSICAL EXAM    /58 (BP Location: Left arm)   Pulse 71   Temp 98.8  F (37.1  C) (Oral)   Resp 18   SpO2 94%   General presentation: Alert, Vital signs reviewed. NAD  HENT: ENT inspection is normal. Oropharynx is moist and clear.   Eye: Pupils are equal and reactive to light. EOMI  Neck: The neck is supple, with full ROM, with no evidence of meningismus.  Pulmonary: Currently in no acute respiratory distress. Normal, non labored respirations, the lung sounds are normal with good equal air movement. Clear to auscultation bilaterally.   Circulatory: Regular rate and rhythm. Peripheral pulses are strong and equal. No murmurs, rubs, or gallops.   Abdominal: The abdomen is soft. Nontender. No rigidity, guarding, or rebound. Bowel sounds normal.   Neurologic: Alert, oriented to person, place, and time. No motor deficit. No sensory deficit. Cranial nerves II through XII are  intact.  Musculoskeletal: Incision over left knee is clean, dry and intact, but there is mild to moderate erythema, warmth, and tenderness to palpation around the site which extends below the incision site over the left lower leg. No crepitus or fluctuant areas are noted.   Skin: Skin color is normal. No rash. Warm. Dry to touch.       LAB:  All pertinent labs reviewed and interpreted.  Labs Ordered and Resulted from Time of ED Arrival Up to the Time of Departure from the ED   BASIC METABOLIC PANEL - Abnormal; Notable for the following components:       Result Value    Urea Nitrogen 52 (*)     Creatinine 2.08 (*)     Calcium 8.4 (*)     GFR Estimate 28 (*)     All other components within normal limits   CRP INFLAMMATION - Abnormal; Notable for the following components:    CRP 4.8 (*)     All other components within normal limits   CBC WITH PLATELETS AND DIFFERENTIAL - Abnormal; Notable for the following components:    RBC Count 2.37 (*)     Hemoglobin 7.1 (*)     Hematocrit 22.3 (*)     Platelet Count 884 (*)     Absolute Immature Granulocytes 0.1 (*)     All other components within normal limits   SARS-COV2 (COVID-19) VIRUS RT-PCR - Normal    Narrative:     Testing was performed using the [employer]  SARS-CoV-2 & Influenza A/B Assay on the [employer]ef  Estela  System.  This test should be ordered for the detection of SARS-COV-2 in individuals who meet SARS-CoV-2 clinical and/or epidemiological criteria. Test performance is unknown in asymptomatic patients.  This test is for in vitro diagnostic use under the FDA EUA for laboratories certified under CLIA to perform moderate and/or high complexity testing. This test has not been FDA cleared or approved.  A negative test does not rule out the presence of PCR inhibitors in the specimen or target RNA in concentration below the limit of detection for the assay. The possibility of a false negative should be considered if the patient's recent exposure or clinical presentation suggests  COVID-19.  [facility] are certified under the Clinical Laboratory Improvement Amendments of 1988 (CLIA-88) as qualified to perform moderate and/or high complexity laboratory testing.   EXTRA BLOOD CULTURE BOTTLE   EXTRA GREEN TOP (LITHIUM HEPARIN) TUBE   EXTRA PURPLE TOP TUBE   CALL   CALL   PATIENT CARE ORDER   TYPE AND SCREEN, ADULT   COVID-19 VIRUS (CORONAVIRUS) BY PCR    Narrative:     The following orders were created for panel order Asymptomatic COVID-19 Virus (Coronavirus) by PCR Nasopharyngeal.  Procedure                               Abnormality         Status                     ---------                               -----------         ------                     SARS-COV2 (COVID-19) Vir...[[id number]]  Normal              Final result                 Please view results for these tests on the individual orders.   BLOOD CULTURE   BLOOD CULTURE   EXTRA TUBE    Narrative:     The following orders were created for panel order [facility] Draw.  Procedure                               Abnormality         Status                     ---------                               -----------         ------                     Extra Blood Culture Bottle[[id number]]                       Final result               Extra Green Top (Lithium...[[id number]]                      Final result               Extra Purple Top Tube[[id number]]                            Final result                 Please view results for these tests on the individual orders.   CBC WITH PLATELETS & DIFFERENTIAL    Narrative:     The following orders were created for panel order CBC with platelets + differential.  Procedure                               Abnormality         Status                     ---------                               -----------         ------                     CBC with platelets and d...[[id number]]  Abnormal            Final result                 Please view results for these tests on the individual orders.    ABO/RH TYPE AND SCREEN    Narrative:     The following orders were created for panel order ABO/Rh type and screen.  Procedure                               Abnormality         Status                     ---------                               -----------         ------                     Adult Type and Screen[[id number]]                            Edited Result - FINAL        Please view results for these tests on the individual orders.       RADIOLOGY:  Reviewed all pertinent imaging. Please see official radiology reports  US Lower Extremity Venous Duplex Left   Final Result   IMPRESSION:   1.  No deep venous thrombosis in the left lower extremity.      XR Knee Left 3 Views   Final Result   IMPRESSION: Postoperative changes of left total knee arthroplasty. The components appear well seated. No evidence for fracture. There is a small left knee joint effusion. There is fairly extensive swelling external and anterior to the knee joint within    the subcutaneous soft tissues. There is also a lesion within the distal femur which most likely represents an enchondroma.      XR Chest 1 View   Final Result   IMPRESSION: Heart and mediastinal size are normal. There is mixed interstitial and alveolar infiltrate involving the left lung base, compatible with pneumonia. Elsewhere is diffuse indistinctness of the pulmonary interstitium, likely representing airway    inflammation. No pleural effusion or pneumothorax.            I, Justyna Williamson, am serving as a scribe to document services personally performed by Dr. Jennifer Houser based on my observation and the provider's statements to me. I, Jennifer Houser, DO attest that Justyna Williamson is acting in a scribe capacity, has observed my performance of the services and has documented them in accordance with my direction.    Jennifer Houser D.O.  Emergency Medicine  [facility] EMERGENCY ROOM  [address]  [id number]  [phone]  Dept: [phone]         Jennifer Houser DO  07/23/21 1904

## 2021-07-23 NOTE — H&P
[facility] MEDICINE ADMISSION HISTORY AND PHYSICAL     Assessment & Plan      Dread Justice is a 86 year old old male with history of COPD, left cerebellar stroke, mild right-sided weakness, peptic ulcer disease ,legally blind, left total knee arthroplasty 7/7, was rehabbing at [facility] came to the emergency department with positive blood culture found to have cellulitis on left knee and pneumonia.  Started on IV Zosyn and admitted to the hospital.    Assessment & Plan   Pneumonia, possible healthcare associated  Chest x-ray-mixed interstitial and alveolar infiltrate involving left lung base  IV Zosyn and doxycycline  Positive blood culture prior to the admission  Repeat blood culture    Left leg cellulitis  Left total knee arthroplasty on 7/7  IV Zosyn  Orthopedic surgery consult    Acute blood loss anemia, hemoglobin 7  Iron study  Consider blood transfusion if hemoglobin drops below 7    CKD 3  Monitor renal function closely    Code full  DVT prophylaxis  Early ambulation and SCDs  Inpatient admission  Needs to stay in the hospital at least for 2 days for further evaluation and treatment    Chief Complaint  left knee pain, swelling, redness and positive blood culture     HISTORY     Mr. Dread Justice is a 86 year old old male with history of COPD, left cerebellar stroke, mild right-sided weakness, peptic ulcer disease ,ckd 3, legally blind, left total knee arthroplasty 7/7, was rehabbing at [facility] came to the emergency department with positive blood culture.  He has more left knee pain, swelling, redness found to have cellulitis.  He also has pneumonia.  He is afebrile.  Started on IV antibiotic and admitted to the hospital.  Denies headache, chest pain, breathing difficulty, palpitation, nausea, vomiting, abdominal pain or urinary symptoms.    History is provided by the patient.  Spoke with ED physician.    Past Medical History     COPD  Left cerebellar stroke  Mild right-sided weakness  Peptic  ulcer disease    Surgical History   No past surgical history on file.  Family History    Reviewed.  Father had coronary artery disease.  Mother had heart failure, CVA  Social History      History of heavy tobacco use  Allergies   No Known Allergies  Prior to Admission Medications      None      Review of Systems     A 12 point comprehensive review of systems was negative except as noted above in HPI.    PHYSICAL EXAMINATION       Vitals      Temp:  [98  F (36.7  C)] 98  F (36.7  C)  Pulse:  [] 74  Resp:  [16-22] 18  BP: (128-180)/(59-70) 136/63  SpO2:  [79 %-100 %] 97 %    Examination     GENERAL:  Alert, appears comfortable, in no acute distress, appears stated age   HEAD:  Normocephalic, without obvious abnormality, atraumatic   EYES:  PERRL, conjunctiva  clear,  EOM's intact   NOSE: Nares normal,  mucosa normal, no drainage   THROAT: Lips, mucosa, gums normal, mouth moist   NECK: Supple, symmetrical, trachea midline   BACK:   Symmetric, no curvature, ROM normal   LUNGS:   Clear to auscultation bilaterally, no rales, rhonchi, or wheezing, symmetric chest rise on inhalation, respirations unlabored   CHEST WALL:  No tenderness or deformity   HEART:  Regular rate and rhythm, S1 and S2 normal, no murmur, rub, or gallop    ABDOMEN:   Soft, non-tender, bowel sounds active , no masses, no organomegaly, no rebound or guarding   EXTREMITIES: Status post left knee arthroplasty, incision is healing.  Erythema,  swelling and tenderness of her left knee   SKIN: Erythema over left knee   NEURO: Alert, oriented x 4, non-focal   PSYCH: Cooperative, behavior is appropriate      Pertinent Lab     Most Recent 3 CBC's:Recent Labs   Lab Test 07/23/21  1459 07/23/21  0508   WBC 9.2 9.6   HGB 7.1* 7.0*   MCV 94 96   * 876*     Most Recent 3 BMP's:Recent Labs   Lab Test 07/23/21  1459 07/23/21  0508    138   POTASSIUM 4.6 4.3   CHLORIDE 105 104   CO2 22 22   BUN 52* 50*   CR 2.08* 2.02*   ANIONGAP 11 12   DELIO 8.4* 8.5     92         Pertinent Radiology     Radiology Results:   Recent Results (from the past 24 hour(s))   XR Chest 1 View    Narrative    EXAM: XR CHEST 1 VIEW  LOCATION: [facility]  DATE/TIME: 7/23/2021 4:00 PM    INDICATION: Fevers.    COMPARISON: 11/20/2014.      Impression    IMPRESSION: Heart and mediastinal size are normal. There is mixed interstitial and alveolar infiltrate involving the left lung base, compatible with pneumonia. Elsewhere is diffuse indistinctness of the pulmonary interstitium, likely representing airway   inflammation. No pleural effusion or pneumothorax.   XR Knee Left 3 Views    Narrative    EXAM: XR KNEE LEFT 3 VIEWS  LOCATION: [facility]  DATE/TIME: 7/23/2021 4:00 PM    INDICATION: increasing pain and swelling s/p surgery  COMPARISON: None.      Impression    IMPRESSION: Postoperative changes of left total knee arthroplasty. The components appear well seated. No evidence for fracture. There is a small left knee joint effusion. There is fairly extensive swelling external and anterior to the knee joint within   the subcutaneous soft tissues. There is also a lesion within the distal femur which most likely represents an enchondroma.   US Lower Extremity Venous Duplex Left    Narrative    EXAM: US LOWER EXTREMITY VENOUS DUPLEX LEFT  LOCATION: [facility]  DATE/TIME: 7/23/2021 4:13 PM    INDICATION: pain and swelling s/p surgery  COMPARISON: None.  TECHNIQUE: Venous Duplex ultrasound of the left lower extremity with and without compression, augmentation and duplex. Color flow and spectral Doppler with waveform analysis performed.    FINDINGS: Exam includes the common femoral, femoral, popliteal, and contralateral common femoral veins as well as segmentally visualized deep calf veins and greater saphenous vein.     LEFT: No deep vein thrombosis. No superficial thrombophlebitis. No popliteal cyst.      Impression     IMPRESSION:  1.  No deep venous thrombosis in the left lower extremity.         Monika Huitron MD  [facility]  [facility]   Phone: #[phone]

## 2021-07-23 NOTE — PROGRESS NOTES
Patient had surgery on 7/7 at [facility]. Medical records will be obtained.

## 2021-07-23 NOTE — PHARMACY-ADMISSION MEDICATION HISTORY
Pharmacy Note - Admission Medication History    Pertinent Provider Information: n/a     ______________________________________________________________________    Prior To Admission (PTA) med list completed and updated in EMR.       Prior to Admission Medications   Prescriptions Last Dose Informant Patient Reported? Taking?   acetaminophen (TYLENOL) 325 MG tablet 7/23/2021 at x1  Yes Yes   Sig: Take 975 mg by mouth 3 times daily   albuterol (PROAIR HFA/PROVENTIL HFA/VENTOLIN HFA) 108 (90 Base) MCG/ACT inhaler 7/23/2021 at x1, not with  Yes Yes   Sig: Inhale 2 puffs into the lungs 3 times daily   albuterol (PROAIR HFA/PROVENTIL HFA/VENTOLIN HFA) 108 (90 Base) MCG/ACT inhaler  at not with  Yes Yes   Sig: Inhale 2 puffs into the lungs every 6 hours as needed    aspirin (ASA) 81 MG chewable tablet 7/23/2021 at x1  Yes Yes   Sig: Take 81 mg by mouth 2 times daily   escitalopram (LEXAPRO) 20 MG tablet 7/23/2021 at Unknown time  Yes Yes   Sig: Take 20 mg by mouth daily   ferrous sulfate (FE TABS) 325 (65 Fe) MG EC tablet 7/23/2021 at x1  Yes Yes   Sig: Take 325 mg by mouth 2 times daily   hydrOXYzine (ATARAX) 25 MG tablet 7/23/2021 at x1  Yes Yes   Sig: Take 25 mg by mouth 3 times daily   hydrOXYzine (ATARAX) 25 MG tablet   Yes Yes   Sig: Take 25 mg by mouth every 8 hours as needed for itching   oxyCODONE (ROXICODONE) 5 MG tablet   Yes Yes   Sig: Take 5 mg by mouth every 4 hours as needed for pain   potassium chloride ER (KLOR-CON M) 10 MEQ CR tablet 7/23/2021 at x1  Yes Yes   Sig: Take 10 mEq by mouth 2 times daily   torsemide (DEMADEX) 20 MG tablet 7/23/2021 at x1  Yes Yes   Sig: Take 20 mg by mouth 2 times daily   vitamin B-12 (CYANOCOBALAMIN) 1000 MCG tablet   Yes Yes   Sig: Take 1,000 mcg by mouth every evening      Facility-Administered Medications: None       Information source(s): Facility (TCU/[facility]/) medication list/MAR  Method of interview communication: in-person    Summary of Changes to PTA Med List  New: all  medications  Discontinued: none  Changed: none    Patient was asked about OTC/herbal products specifically.  PTA med list reflects this.    In the past week, patient estimated taking medication this percent of the time:  greater than 90%.    Allergies were reviewed, assessed, and updated with the patient.      Patient did not bring any medications to the hospital and can't retrieve from home. No multi-dose medications are available for use during hospital stay.     The information provided in this note is only as accurate as the sources available at the time of the update(s).    Thank you for the opportunity to participate in the care of this patient.    Veronique Gama RPH  7/23/2021 6:21 PM

## 2021-07-24 NOTE — PROGRESS NOTES
07/24/21 1530   Quick Adds   Type of Visit Initial PT Evaluation   Living Environment   People in home alone  (has caregiver 2x/week)   Current Living Arrangements house   Home Accessibility no concerns   Self-Care   Equipment Currently Used at Home walker, rolling;cane, straight  (4WW)   General Information   Onset of Illness/Injury or Date of Surgery 07/23/21   Referring Physician Dr. Monika Huitron   Patient/Family Therapy Goals Statement (PT) Go Home   Pertinent History of Current Problem (include personal factors and/or comorbidities that impact the POC) Anemia, L TKA 7/7/21   Weight-Bearing Status - LLE full weight-bearing   Weight-Bearing Status - RLE full weight-bearing   Cognition   Orientation Status (Cognition) disoriented to;place;time;verbal cues/prompts needed for orientation   Affect/Mental Status (Cognition) WFL   Follows Commands (Cognition) WFL   Transfers   Transfers sit-stand transfer   Impairments Contributing to Impaired Transfers impaired balance   Sit-Stand Transfer   Sit-Stand [place] (Transfers) minimum assist (75% patient effort);verbal cues   Assistive Device (Sit-Stand Transfers) walker, front-wheeled   Gait/Stairs (Locomotion)   [place] Level (Gait) contact guard;verbal cues   Assistive Device (Gait) walker, front-wheeled   Distance in Feet (Required for LE Total Joints) 350   Pattern (Gait) step-through   Deviations/Abnormal Patterns (Gait) gait speed decreased;bernard decreased   Maintains Weight-bearing Status (Gait) able to maintain   Clinical Impression   Criteria for Skilled Therapeutic Intervention yes, treatment indicated   PT Diagnosis (PT) Impaired functional mobility   Influenced by the following impairments weakness, pain   Functional limitations due to impairments transfers, impaired balance, gait   Clinical Presentation Evolving/Changing   Clinical Presentation Rationale Pt presents as medically diagnosed   Clinical Decision Making (Complexity) moderate  complexity   Therapy Frequency (PT) Daily   Predicted Duration of Therapy Intervention (days/wks) 5 days   Planned Therapy Interventions (PT) gait training;bed mobility training;balance training;home exercise program;strengthening;stair training;patient/family education;neuromuscular re-education;transfer training   Anticipated Equipment Needs at Discharge (PT) walker, rolling   Risk & Benefits of therapy have been explained patient   PT Discharge Planning    PT Discharge Recommendation (DC Rec) Transitional Care Facility   PT Rationale for DC Rec Fall risk due to vision impairments and weakness   Total Evaluation Time   Total Evaluation Time (Minutes) 10

## 2021-07-24 NOTE — PHARMACY-VANCOMYCIN DOSING SERVICE
"Pharmacy Vancomycin Initial Note  Date of Service 2021  Patient's  10/21/1934  86 year old, male    Indication: Aspiration Pneumonia    Current estimated CrCl = Estimated Creatinine Clearance: 29.3 mL/min (A) (based on SCr of 1.82 mg/dL (H)).    Creatinine for last 3 days  2021:  5:08 AM Creatinine 2.02 mg/dL;  2:59 PM Creatinine 2.08 mg/dL  2021:  7:10 AM Creatinine 1.82 mg/dL    Recent Vancomycin Level(s) for last 3 days  No results found for requested labs within last 72 hours.      Vancomycin IV Administrations (past 72 hours)      No vancomycin orders with administrations in past 72 hours.                Nephrotoxins and other renal medications (From now, onward)    Start     Dose/Rate Route Frequency Ordered Stop    21 2200  piperacillin-tazobactam (ZOSYN) 3.375 g vial to attach to  mL bag     Note to Pharmacy: For [facility], [facility] and [facility]: For Zosyn-naive patients, use the \"Zosyn initial dose + extended infusion\" order panel.    3.375 g  over 240 Minutes Intravenous EVERY 8 HOURS 21 1827            Contrast Orders - past 72 hours (72h ago, onward)    None          Loading dose: 1250 mg at 10:00 2021.  Regimen: 750 mg IV every 24 hours.  Start time: 08:51 on 2021  Exposure target: AUC24 (range)400-600 mg/L.hr   AUC24,ss: 497 mg/L.hr  Probability of AUC24 > 400: 74 %  Ctrough,ss: 16.9 mg/L  Probability of Ctrough,ss > 20: 33 %  Probability of nephrotoxicity (Lodise PAT ): 13 %      Creatinine is elevated - will need to follow closely and adjust dose as needed per possibly changing creatinine and/or levels      Plan:  1. Start vancomycin  1250 mg IV X1 ,then 750mg IV Q24 hours  2. Vancomycin monitoring method: AUC  3. Vancomycin therapeutic monitoring goal: 400-600 mg*h/L  4. Pharmacy will check vancomycin levels as appropriate in 1-3 Days.    5. Serum creatinine levels will be ordered daily for the first week of therapy and at least twice weekly for subsequent " genna.      Darlin Mcfarland, RPH

## 2021-07-24 NOTE — PLAN OF CARE
Problem: Pain Acute  Goal: Acceptable Pain Control and Functional Ability  Outcome: No Change   Pt has complained of no pain this shift.      Problem: Skin or Soft Tissue Infection  Goal: Infection Symptom Resolution  Outcome: No Change  IV abx. ID did not do consult due to no findings of pos BC.      Problem: Fall Injury Risk  Goal: Absence of Fall and Fall-Related Injury  Pt heavy assist x 2. On alarms. Pt mentation fluctuates and may not be able to call appropriately at times. Educated on call light usage throughout the days and safety checks completed. Call light within reach.      Problem: Adult Inpatient Plan of Care  Goal: Absence of Hospital-Acquired Illness or Injury  Hgb 6.5 2 units blood ordered. 2nd unit infusing. IV zosyn, vanco, doxy.  Unable to obtain sputum culture. FYI: pt has 2 charts that are in the process of being merged.

## 2021-07-24 NOTE — PROGRESS NOTES
[facility] MEDICINE PROGRESS NOTE      Identification/Summary: Dread Justice is a 86 year old male with a past medical history of history of COPD, left cerebellar stroke, mild right-sided weakness, peptic ulcer disease ,legally blind, left total knee arthroplasty 7/7, [facility] came to the emergency department with positive blood culture found to have cellulitis on left knee and pneumonia. Left knee erythema, swelling are much improved with IV antibiotic.  Awaiting for orthopedic surgery consult.      Assessment and Plan:  Pneumonia, ?  Healthcare associated  Chest x-ray-mixed interstitial and alveolar infiltrate involving left lung base  IV Zosyn and doxycycline  Positive blood culture prior to the admission  Repeat blood culture and sputum culture    Left leg cellulitis  Left total knee arthroplasty on 7/7  IV Zosyn and vancomycin  Left knee erythema, swelling are much improved  Orthopedic surgery consult    Acute blood loss anemia  Hemoglobin 6.5  2 units of packed RBC transfusion    Hypertension  Metoprolol XL 25 mg daily started on 7/24  Monitor blood pressure closely    CKD 3  Renal function is stable    Depression and anxiety  Lexapro 20 mg daily    History of left cerebellar stroke    COPD, stable    Code full  DVT prophylaxis  Early ambulation and SCDs   Aspirin 81 mg twice a day    Barrier to discharge  Continue IV antibiotic.  Anticipated discharge in 1 to 2-day    Interval History/Subjective:  Resting comfortably.  Intermittent confusion.  Awaiting for orthopedic surgery consult.  Left knee pain is improved.  Afebrile    Review of system  Rest of the review of system are negative except HPI.  Denies headache, chest pain, breathing difficulty, palpitation, nausea, vomiting, abdominal pain or urinary symptoms.    Physical Exam/Objective:  Temp:  [97.8  F (36.6  C)-98.8  F (37.1  C)] 98.4  F (36.9  C)  Pulse:  [] 82  Resp:  [16-22] 18  BP: (120-180)/(58-70)  162/66  SpO2:  [79 %-100 %] 94 %    Body mass index is 25.31 kg/m .    GENERAL:  Alert, appears comfortable, in no acute distress, appears stated age   HEAD:  Normocephalic, without obvious abnormality, atraumatic   EYES:  PERRL, conjunctiva  clear,  EOM's intact   NOSE: Nares normal,  mucosa normal, no drainage   THROAT: Lips, mucosa,  gums normal, mouth moist   NECK: Supple, symmetrical, trachea midline   BACK:   Symmetric, no curvature, ROM normal   LUNGS:   Clear to auscultation bilaterally, no rales, rhonchi, or wheezing, symmetric chest rise on inhalation, respirations unlabored   CHEST WALL:  No tenderness or deformity   HEART:  Regular rate and rhythm, S1 and S2 normal, no murmur, rub, or gallop    ABDOMEN:   Soft, non-tender, bowel sounds active , no masses, no organomegaly, no rebound or guarding   EXTREMITIES: Left knee erythema and swelling are much improved, warm to touch, previous left knee arthroplasty   SKIN:  Left knee erythema is much improved   NEURO: Alert, oriented x3    PSYCH: Cooperative, behavior is appropriate      Medications:   Personally Reviewed.  Medications       acetaminophen  975 mg Oral TID     albuterol  2 puff Inhalation TID     aspirin  81 mg Oral BID     cyanocobalamin  1,000 mcg Oral QPM     doxycycline (VIBRAMYCIN) IV  100 mg Intravenous Q12H     escitalopram  20 mg Oral Daily     hydrOXYzine  25 mg Oral TID     piperacillin-tazobactam  3.375 g Intravenous Q8H     sodium chloride (PF)  3 mL Intracatheter Q8H     vancomycin  1,250 mg Intravenous Once     vancomycin  750 mg Intravenous Q24H       Data reviewed today: I personally reviewed all new medications, labs, imaging/diagnostics reports over the past 24 hours. Pertinent findings include:    Imaging:   Recent Results (from the past 24 hour(s))   XR Chest 1 View    Narrative    EXAM: XR CHEST 1 VIEW  LOCATION: [facility]  DATE/TIME: 7/23/2021 4:00 PM    INDICATION: Fevers.    COMPARISON:  11/20/2014.      Impression    IMPRESSION: Heart and mediastinal size are normal. There is mixed interstitial and alveolar infiltrate involving the left lung base, compatible with pneumonia. Elsewhere is diffuse indistinctness of the pulmonary interstitium, likely representing airway   inflammation. No pleural effusion or pneumothorax.   XR Knee Left 3 Views    Narrative    EXAM: XR KNEE LEFT 3 VIEWS  LOCATION: [facility]  DATE/TIME: 7/23/2021 4:00 PM    INDICATION: increasing pain and swelling s/p surgery  COMPARISON: None.      Impression    IMPRESSION: Postoperative changes of left total knee arthroplasty. The components appear well seated. No evidence for fracture. There is a small left knee joint effusion. There is fairly extensive swelling external and anterior to the knee joint within   the subcutaneous soft tissues. There is also a lesion within the distal femur which most likely represents an enchondroma.   US Lower Extremity Venous Duplex Left    Narrative    EXAM: US LOWER EXTREMITY VENOUS DUPLEX LEFT  LOCATION: [facility]  DATE/TIME: 7/23/2021 4:13 PM    INDICATION: pain and swelling s/p surgery  COMPARISON: None.  TECHNIQUE: Venous Duplex ultrasound of the left lower extremity with and without compression, augmentation and duplex. Color flow and spectral Doppler with waveform analysis performed.    FINDINGS: Exam includes the common femoral, femoral, popliteal, and contralateral common femoral veins as well as segmentally visualized deep calf veins and greater saphenous vein.     LEFT: No deep vein thrombosis. No superficial thrombophlebitis. No popliteal cyst.      Impression    IMPRESSION:  1.  No deep venous thrombosis in the left lower extremity.       Labs:  Most Recent 3 CBC's:Recent Labs   Lab Test 07/24/21  0710 07/23/21  1459 07/23/21  0508   WBC 8.7 9.2 9.6   HGB 6.5* 7.1* 7.0*   MCV 94 94 96   * 884* 876*     Most Recent 3  BMP's:Recent Labs   Lab Test 07/24/21  0710 07/23/21  1459 07/23/21  0508    138 138   POTASSIUM 4.3 4.6 4.3   CHLORIDE 109* 105 104   CO2 21* 22 22   BUN 45* 52* 50*   CR 1.82* 2.08* 2.02*   ANIONGAP 10 11 12   DELIO 8.2* 8.4* 8.5   GLC 94 106 92       Monika Huitron MD  [facility]  Phone: #[phone]

## 2021-07-24 NOTE — PLAN OF CARE
Problem: Adult Inpatient Plan of Care  Goal: Absence of Hospital-Acquired Illness or Injury  Intervention: Identify and Manage Fall Risk  Recent Flowsheet Documentation  Taken 7/24/2021 0122 by Sonya Berger RN  Safety Promotion/Fall Prevention:    safety round/check completed    room door open    increase visualization of patient    fall prevention program maintained    bed alarm on    activity supervised     Problem: Risk for Delirium  Goal: Improved Attention and Thought Clarity  Outcome: No Change     Problem: Skin or Soft Tissue Infection  Goal: Infection Symptom Resolution  Outcome: No Change     Problem: Pain Acute  Goal: Acceptable Pain Control and Functional Ability  Outcome: No Change     Pt oriented to self. Needs reminders/redirection.  Pt moaned and grimaced in pain, but declined any interventions offered. Complained of pain to abdomen with some relief after BM. Pt was also repositioned for comfort.   Incontinent of bladder. Brief changed several times overnight.   L knee warm and red.  Pivoted to commode with Assist of 2.  IV site replaced this AM. Receiving Zosyn at this time.   Monitoring Hgb.  Bed alarm on for safety.

## 2021-07-24 NOTE — PROGRESS NOTES
ID consulted for positive blood cultures from care facility. We are unable to find any positive blood cultures (can't even find blood cultures ordered at care facility)-wonder if miscommunication. Discussed with Dr Huitron. She doesn't have any further questions for ID and will cancel the consult. Please reconsult if additional questions arise.   Gail Johnson MD  [facility]  [phone]

## 2021-07-24 NOTE — PLAN OF CARE
VSS. Patient disoriented X2; confused and forgetful at times. Easily reoriented and redirectable. Provided with lots of reorientation and cues. Pain exhibiting signs of pain; grimacing and moaning and rated pain 8/10 to left knee; oxycodone given with relief. IV antibiotics administered as ordered. Ambulated to bathroom three times this shift. Bed alarms on, frequent rounding for safety.   Problem: Skin or Soft Tissue Infection  Goal: Infection Symptom Resolution  Outcome: No Change  Intervention: Provide Meticulous Infection Site Care  Recent Flowsheet Documentation  Taken 7/23/2021 1900 by Rhoda Schofield RN  Infection Prevention:    environmental surveillance performed    hand hygiene promoted    rest/sleep promoted       Problem: Risk for Delirium  Goal: Optimal Coping  Outcome: Improving  Intervention: Optimize Psychosocial Adjustment to Delirium  Recent Flowsheet Documentation  Taken 7/23/2021 1900 by Rhoda Schofield RN  Supportive Measures:    active listening utilized    positive reinforcement provided     Problem: Pain Acute  Goal: Acceptable Pain Control and Functional Ability  Outcome: Improving  Intervention: Prevent or Manage Pain  Recent Flowsheet Documentation  Taken 7/23/2021 1900 by Rhoda Schofield RN  Sensory Stimulation Regulation:    care clustered    quiet environment promoted  Sleep/Rest Enhancement: relaxation techniques promoted  Intervention: Optimize Psychosocial Wellbeing  Recent Flowsheet Documentation  Taken 7/23/2021 1900 by Rhoda Schofield RN  Supportive Measures:    active listening utilized    positive reinforcement provided

## 2021-07-24 NOTE — CONSULTS
[facility] Orthopedic Consultation    Dread Justice MRN# [id number]   Age: 86 year old YOB: 1934     Date of Admission:  7/23/2021    Reason for consult: Left knee cellulitis       Requesting physician: Dr. Huitron       Level of consult: Consult, follow and place orders           Assessment and Plan:   Assessment:     Left knee cellulitis with history of TKA on 7/7/2021        Plan:   --At this time the infection appears to be superficial, therefore recommend continued conservative management.   --Continue IV antibiotics; blood cultures pening  --PT/OT for gait training, ambulation and knee ROM as tolerated  --WBAT  --Ortho will continue to follow    Thank you for this consultation.               Chief Complaint:   Left knee pain an swelling       History is obtained from the patient and EMR         History of Present Illness:   This patient is a 86 year old male who presents with the following condition requiring a hospital admission:      History of left TKA by Dr. Frazier on 7/7/2021.  Was rehabbing at TCU and presented to ED yesterday withleft knee pain and pneumonia.  Patient was admitted for further evaluation and treatment.  ID and medicine following, symptoms appear to be improving with IV abx.  Due to concern for left knee cellulitis, ortho was consulted.     On visitation today patient is resting comfortably in bed.  Denies any numbness or tingling in the LLE.  Ongoing left knee pain.  Was transfused 2 units today due to low HGB.                    Past Medical History:   No past medical history on file.          Past Surgical History:   No past surgical history on file.          Social History:     Social History     Tobacco Use     Smoking status: Not on file   Substance Use Topics     Alcohol use: Not on file             Family History:   No family history on file.  Family history not discussed            Allergies:     Allergies   Allergen Reactions     Ibuprofen Unknown              Medications:     Current Facility-Administered Medications   Medication     acetaminophen (TYLENOL) tablet 975 mg     albuterol (PROAIR HFA/PROVENTIL HFA/VENTOLIN HFA) 108 (90 Base) MCG/ACT inhaler 2 puff     aspirin (ASA) chewable tablet 81 mg     cyanocobalamin (VITAMIN B-12) tablet 1,000 mcg     doxycycline (VIBRAMYCIN) 100 mg vial to attach to  mL bag     escitalopram (LEXAPRO) tablet 20 mg     hydrOXYzine (ATARAX) tablet 25 mg     lactobacillus rhamnosus (GG) (CULTURELL) capsule 1 capsule     lidocaine (LMX4) cream     lidocaine 1 % 0.1-1 mL     melatonin tablet 1 mg     metoprolol succinate ER (TOPROL-XL) 24 hr tablet 25 mg     naloxone (NARCAN) injection 0.2 mg    Or     naloxone (NARCAN) injection 0.4 mg    Or     naloxone (NARCAN) injection 0.2 mg    Or     naloxone (NARCAN) injection 0.4 mg     oxyCODONE (ROXICODONE) tablet 5-10 mg     piperacillin-tazobactam (ZOSYN) 3.375 g vial to attach to  mL bag     sodium chloride (PF) 0.9% PF flush 3 mL     sodium chloride (PF) 0.9% PF flush 3 mL     [START ON 7/25/2021] vancomycin 750 mg in 0.9% NaCl 250 mL intermittent infusion 750 mg               Physical Exam:   Temp: 98  F (36.7  C) Temp src: Oral BP: 128/61 Pulse: 70   Resp: 18 SpO2: 95 % O2 Device: None (Room air)    All vitals have been reviewed    On exam of the left knee the previous incision site is well healed.  There is a moderate amount of erythema and warmth surrounding the entire knee consitant with cellulitis.  Mild diffuse swelling but no significant effusion or fluid collection noted.  Good dorsi/plantar flexion of bilateral ankles.  Bilateral calves are soft and non tender.  Increase pain in knee with attempted flexion.             Data:     Lab Results   Component Value Date    WBC 8.7 07/24/2021    HGB 6.5 (LL) 07/24/2021    HCT 20.4 (L) 07/24/2021     (H) 07/24/2021     07/24/2021    POTASSIUM 4.3 07/24/2021    CHLORIDE 109 (H) 07/24/2021    CO2 21 (L)  07/24/2021    BUN 45 (H) 07/24/2021    CR 1.82 (H) 07/24/2021    GLC 94 07/24/2021     Imaging:    XR left knee; 7/23/2021:   IMPRESSION: Postoperative changes of left total knee arthroplasty. The components appear well seated. No evidence for fracture. There is a small left knee joint effusion. There is fairly extensive swelling external and anterior to the knee joint within the subcutaneous soft tissues. There is also a lesion within the distal femur which most likely represents an enchondroma.    US left lower extremity; 7/23/2021:   FINDINGS: Exam includes the common femoral, femoral, popliteal, and contralateral common femoral veins as well as segmentally visualized deep calf veins and greater saphenous vein.      LEFT: No deep vein thrombosis. No superficial thrombophlebitis. No popliteal cyst.     IMPRESSION:  1.  No deep venous thrombosis in the left lower extremity.      Licha Jara PA-C

## 2021-07-25 NOTE — PLAN OF CARE
Patient disoriented to time and situation; confused and forgetful at times. Provided with frequent reorientation. VSS. Completed second unit of blood this shift. Hgb recheck in the AM. VSS. Ambulated in room/bathroom multiple times this shift. Up with 1-2 assist and walker. IV antibiotics administered as ordered. Left knee erythema and edema improving. Denies pain or the need for intervention.   Problem: Adult Inpatient Plan of Care  Goal: Plan of Care Review  Outcome: Improving     Problem: Pain Acute  Goal: Acceptable Pain Control and Functional Ability  Outcome: Improving  Intervention: Prevent or Manage Pain  Recent Flowsheet Documentation  Taken 7/24/2021 1615 by Rhoda Schofield RN  Sensory Stimulation Regulation:    care clustered    quiet environment promoted  Sleep/Rest Enhancement:    awakenings minimized    relaxation techniques promoted  Intervention: Optimize Psychosocial Wellbeing  Recent Flowsheet Documentation  Taken 7/24/2021 1615 by Rhoda Schofield RN  Supportive Measures:    active listening utilized    positive reinforcement provided     Problem: Fall Injury Risk  Goal: Absence of Fall and Fall-Related Injury  Outcome: Improving  Intervention: Identify and Manage Contributors  Recent Flowsheet Documentation  Taken 7/24/2021 1615 by Rhoda Schofield RN  Self-Care Promotion:    BADL personal routines maintained    BADL personal objects within reach    independence encouraged  Intervention: Promote Injury-Free Environment  Recent Flowsheet Documentation  Taken 7/24/2021 1615 by Rhoda Schofield RN  Safety Promotion/Fall Prevention:    bed alarm on    activity supervised    assistive device/personal items within reach    fall prevention program maintained    clutter free environment maintained    chair alarm on    safety round/check completed    room door open    patient and family education    nonskid shoes/slippers when out of bed    mobility aid in reach

## 2021-07-25 NOTE — PROGRESS NOTES
"Ortho Progress Note    CC: left knee cellulitis      Subjective:  Pain: moderate anterior left knee  Chest pain, SOB:  no  Nausea, vomiting:  no  Lightheadedness, dizziness:  no  Neuro:  Patient denies new onset numbness or paresthesias  Patient reports redness seems a bit better    Objective:  BP (!) 154/69 (BP Location: Left arm)   Pulse 72   Temp 98.2  F (36.8  C) (Oral)   Resp 18   Ht 1.676 m (5' 6\")   Wt 70.7 kg (155 lb 12.8 oz)   SpO2 96%   BMI 25.15 kg/m    Gen: A&O x 3. NAD. Appears comfortable resting in recliner with legs crossed  Wound status: erythema over the anterior knee. Incision intact with no active drainage. Trace effusion. Mild TTP over area of erythema  Able to actively and passively extend and flex knee. Pt reports mild but tolerable pain with this.  Weight bearing without significant pain.  Patient notes he is guarding during motion assessment.  Circulation, motion and sensation: Dorsiflexion/plantarflexion intact and equal bilaterally; distal lower extremity sensation is intact and equal bilaterally    Calf tenderness: calves are soft and non-tender bilaterally    Pertinent Labs   Lab Results: personally reviewed.   No results found for: INR, PROTIME  Lab Results   Component Value Date    WBC 8.7 07/24/2021    HGB 8.6 (L) 07/25/2021    HCT 20.4 (L) 07/24/2021    MCV 94 07/24/2021     (H) 07/24/2021     Lab Results   Component Value Date     07/24/2021    CO2 21 (L) 07/24/2021       Assessment: left knee cellulitis, no obvious intraarticular involvement at this time    Plan:   No clear indication for surgery at this time.    Recommend continued ABX.  Continue PT/OT. ROM as tolerated  Weightbearing status:WBAT  Ortho will continue to follow.      Report completed by:  Katty Williamson PA-C PA-C  Date: 7/25/2021  Time: 8:35 AM    "

## 2021-07-25 NOTE — CONSULTS
CONSULTING PHYSICIAN   Monika Huitron MD     REASON FOR CONSULTATION   anemia     IMPRESSION   Dread Justice is a pleasant 86 year old male with the followin) Acute on chronic anemia- no clear GI bleeding. His stools are dark but he has been on iron. He has responded very well to his transfusions, suggesting against an active bleeding process. He has had prior endoscopic evaluation for his chronic anemia in 2012, and iron studies suggest anemia of chronic disease. He has been on recent NSAIDS, raising concern for gastritis/peptic ulcer disease. However, in the absence of clear, gross bleeding, there is no urgency for endoscopic evaluation.  2) Knee cellulitis status post left TKA  3) CKD  4) hemoccult positive stool- this is a screening test for colon cancer, and is not validated in the setting of anemia or concern for GI bleed. He is over 80 and therefore not a screening candidate. Therefore the test is of unclear significance.  5) COPD  6) history of left cerebellar stroke  7) possible pneumonia- on antibioics  8) legally blind     RECOMMENDATIONS   -Discussed with the RN and hospitalist. I recommend diet as tolerated today.  -Trend hemoglobin, follow stools for sign of gross bleeding  -may continue aspirin for now- His clot risk is elevated after orthopedic surgery, and he has had a CVA in the past. At this point, without gross GI bleeding, I think the benefits of aspirin outweigh the risks  -no other NSAIDS besides aspirin  -continue pantoprazole  -iron infusion planned  -will follow up tomorow       HISTORY OF PRESENT ILLNESS   Dread Justice is a pleasant 86 year old male with history of COPD, prior CVA on aspirin, CKD, chronic anemia with baseline around 8 who presented with left knee cellulitis after having a left TKA on . There was initial mention of a positive blood culture at an outside hospital. However, on review of his second [place] chart (merge is pending),  he had hemoccult positivity and not a positive blood culture. He has been treated with antibiotics to cover both the cellulitis and a potential pneumonia. His hemoglobin dropped from 7 on 7/23 to 6.5 yesterday. Green/dark stool was recorded overnight. I discussed with the caregiver who witnessed the stool, and it was NOT black. His hemoglobin improved to 8.6 after 2 units were given. The patient denies abdominal pain. He reports a lower appetite in general since his surgery. He was taking ibuprofen (8 pills/day by his report) prior to his surgery, in addition to aspirin 162mg.     He had prior [facility] workup for anemia in 2012, including an EGD (gastritis/reactive gastropathy, negative for H Pylori. Negative duodenal biopsies) and colonoscopy (adenoma x 1, hemorrhoids and sigmoid diverticulosis). In his 2nd epic chart (merge pending), Hgb was 8.2 on 7/8, and 10.4 in 7/20.      PAST HISTORY   No past medical history on file.   No past surgical history on file.     Family History Social History   No family history on file. Social History     Socioeconomic History     Marital status:      Spouse name: Not on file     Number of children: Not on file     Years of education: Not on file     Highest education level: Not on file   Occupational History     Not on file   Tobacco Use     Smoking status: Not on file   Substance and Sexual Activity     Alcohol use: Not on file     Drug use: Not on file     Sexual activity: Not on file   Other Topics Concern     Not on file   Social History Narrative     Not on file     Social Determinants of Health     Financial Resource Strain:      Difficulty of Paying Living Expenses:    Food Insecurity:      Worried About Running Out of Food in the Last Year:      Ran Out of Food in the Last Year:    Transportation Needs:      Lack of Transportation (Medical):      Lack of Transportation (Non-Medical):    Physical Activity:      Days of Exercise per Week:      Minutes of Exercise per  Session:    Stress:      Feeling of Stress :    Social Connections:      Frequency of Communication with Friends and Family:      Frequency of Social Gatherings with Friends and Family:      Attends [employer] Services:      Active Member of Clubs or Organizations:      Attends Club or Organization Meetings:      Marital Status:    Intimate Partner Violence:      Fear of Current or Ex-Partner:      Emotionally Abused:      Physically Abused:      Sexually Abused:          MEDICATIONS & ALLERGIES   Medications Prior to Admission   Medication Sig Dispense Refill Last Dose     acetaminophen (TYLENOL) 325 MG tablet Take 975 mg by mouth 3 times daily   7/23/2021 at x1     albuterol (PROAIR HFA/PROVENTIL HFA/VENTOLIN HFA) 108 (90 Base) MCG/ACT inhaler Inhale 2 puffs into the lungs 3 times daily   7/23/2021 at x1, not with     albuterol (PROAIR HFA/PROVENTIL HFA/VENTOLIN HFA) 108 (90 Base) MCG/ACT inhaler Inhale 2 puffs into the lungs every 6 hours as needed     at not with     aspirin (ASA) 81 MG chewable tablet Take 81 mg by mouth 2 times daily   7/23/2021 at x1     escitalopram (LEXAPRO) 20 MG tablet Take 20 mg by mouth daily   7/23/2021 at Unknown time     ferrous sulfate (FE TABS) 325 (65 Fe) MG EC tablet Take 325 mg by mouth 2 times daily   7/23/2021 at x1     hydrOXYzine (ATARAX) 25 MG tablet Take 25 mg by mouth 3 times daily   7/23/2021 at x1     hydrOXYzine (ATARAX) 25 MG tablet Take 25 mg by mouth every 8 hours as needed for itching        oxyCODONE (ROXICODONE) 5 MG tablet Take 5 mg by mouth every 4 hours as needed for pain        potassium chloride ER (KLOR-CON M) 10 MEQ CR tablet Take 10 mEq by mouth 2 times daily   7/23/2021 at x1     torsemide (DEMADEX) 20 MG tablet Take 20 mg by mouth 2 times daily   7/23/2021 at x1     vitamin B-12 (CYANOCOBALAMIN) 1000 MCG tablet Take 1,000 mcg by mouth every evening           ALLERGIES   Allergies   Allergen Reactions     Ibuprofen Unknown         REVIEW OF SYSTEMS      "See HPI for pertinent positives and negatives. A comprehensive review of systems was performed and was otherwise noncontributory.     OBJECTIVE   Vitals Blood pressure (!) 154/69, pulse 72, temperature 98.2  F (36.8  C), temperature source Oral, resp. rate 18, height 1.676 m (5' 6\"), weight 70.7 kg (155 lb 12.8 oz), SpO2 96 %.           Physical  Exam   GENERAL: alert and oriented, no acute disstress.     HEENT: atraumatic, anicteric, moist mucous membranes, neck soft/supple. Significant visual impairment.    PULMONARY: normal resp effort, breath sounds clear to auscultation bilaterally    CARDIOVASCULAR: normal rate and rhythm, no murmurs    ABDOMEN: soft, no tenderness, no distention, bowel sounds normal. No hepatosplenomegaly.     MUSCULOSKELETAL: erythematous/swollen and warm left knee. Distal left leg edema as well.    NEUROLOGICAL: appropriate mental status, grossly intact. Perhaps mild short term memory impairment    PSYCHIATRIC: normal mood, affect and insight  ourished in no apparent distress.     SKIN: warm and dry, no rashes    EXT: no edema        LABORATORY    ELECTROLYTE PANEL   Recent Labs   Lab 07/24/21  0710 07/23/21  1459 07/23/21  0508    138 138   POTASSIUM 4.3 4.6 4.3   CHLORIDE 109* 105 104   CO2 21* 22 22   GLC 94 106 92   CR 1.82* 2.08* 2.02*   BUN 45* 52* 50*      HEMATOLOGY PANEL   Recent Labs   Lab 07/25/21  0623 07/24/21  0710 07/23/21  1459 07/23/21  0508   HGB 8.6* 6.5* 7.1* 7.0*   MCV  --  94 94 96   WBC  --  8.7 9.2 9.6   PLT  --  819* 884* 876*      LIVER AND PANCREAS PANEL   No results for input(s): AST, ALT, ALKPHOS, BILITOTAL, LIPASE in the last 168 hours.  IMAGING STUDIES        I have reviewed the current diagnostic and laboratory tests.                 dEyta Orellana MD  Thank you for the opportunity to participate in the care of this patient.   Please feel free to call me with any questions or concerns.  Phone number [phone].            "

## 2021-07-25 NOTE — PROGRESS NOTES
07/25/21 1000   Living Environment   People in home alone   Current Living Arrangements house   Living Environment Comments Prior to L TKA on 7/7/21, pt lived at home w/caregiver support   Self-Care   Current Activity Tolerance fair   Instrumental Activities of Daily Living (IADL)   IADL Comments pt poor historian, difficult to provide details   Disability/Function   Wear Glasses or Blind other (see comments)  (pt reports he only has peripheral vision )   Dressing/Bathing Difficulty yes   Dressing/Bathing dressing difficulty, assistance 1 person   Toileting issues yes   Toileting Assistance toileting difficulty, assistance 1 person   General Information   Onset of Illness/Injury or Date of Surgery 07/23/21   Referring Physician Monika Huitron   Patient/Family Therapy Goal Statement (OT) None stated   Limitations/Impairments safety/cognitive;sensory;visual   Right Lower Extremity (Weight-bearing Status) weight-bearing as tolerated (WBAT)   Cognitive Status Examination   Orientation Status orientation to person, place and time   Affect/Mental Status (Cognitive) confused   Follows Commands follows one-step commands;50-74% accuracy;increased processing time needed;physical/tactile prompts required;repetition of directions required   Safety Deficit impulsivity;judgment;problem-solving   Executive Function Deficit organization/sequencing   Cognitive Status Comments Disorganized speaking and conversation at times. Off topic or unrelated details about situation.   Visual Perception   Impact of Vision Impairment on Function (Vision) pt needs step by step cues of where to walk and what surfaces to reach to find objects.   Sensory   Sensory Comments decreased awareness of where things are visually and tactile. Ex difficulty pulling pants up and know if they are all the way up. Pullled off sock instead of brief   Transfers   Transfers toilet transfer   Toilet Transfer   Heth Level (Toilet Transfer) verbal  cues;moderate assist (50% patient effort)   Assistive Device (Toilet Transfer) raised toilet seat   Toilet Transfer Comments needs verbal and tactile cues to follow directions.    Clinical Impression   Criteria for Skilled Therapeutic Interventions Met (OT) yes   OT Diagnosis Decreased ADL independence   OT Problem List-Impairments impacting ADL balance;cognition;sensation;strength;vision   Assessment of Occupational Performance 3-5 Performance Deficits   Identified Performance Deficits dressing, bathing, transfers   Planned Therapy Interventions (OT) ADL retraining;cognition   Clinical Decision Making Complexity (OT) moderate complexity   Therapy Frequency (OT) Daily   Predicted Duration of Therapy 4   Anticipated Equipment Needs Upon Discharge (OT) raised toilet seat;shower chair   Risk & Benefits of therapy have been explained patient   OT Discharge Planning    OT Discharge Recommendation (DC Rec) Transitional Care Facility   OT Rationale for DC Rec Decreased ADL independence   Total Evaluation Time (Minutes)   Total Evaluation Time (Minutes) 15

## 2021-07-25 NOTE — PLAN OF CARE
Problem: Skin or Soft Tissue Infection  Goal: Infection Symptom Resolution  Outcome: Improving     Problem: Risk for Delirium  Goal: Improved Behavioral Control  Outcome: No Change     Problem: Urinary Incontinence  Goal: Effective Urinary Elimination  Outcome: Declining     Pt oriented to self. Needs reminders/redirection. Very short/irritated with requests from staff.    Pt stated verbalized that he was in pain while having bowel movements. Stool was dark this evening and tested + for occult blood. Hgb rechecked this AM and improved to 8.6.  GI consult placed.     Incontinent of bladder and bowel overnight. Brief changed several times. Barrier cream applied.    L knee warm and red.    Receiving Zosyn at this time.     Bed alarm on for safety.

## 2021-07-25 NOTE — SIGNIFICANT EVENT
Informed of (+) black stools    We did send Stool OB -- positive    Was admitted with Hgb of 6.5 --- required 2 units    Assessment  - Melena, could be UGIB s/p 2 units PRBC, only on ASA twice a day     Plans -- NPO, protonix IV, and will refer to GI. May need to hold ASA     Will inform AM doc

## 2021-07-25 NOTE — PLAN OF CARE
Problem: Pain Acute  Goal: Acceptable Pain Control and Functional Ability  Outcome: No Change   Pt has complained of no pain this shift.      Problem: Fall Injury Risk  Goal: Absence of Fall and Fall-Related Injury  Outcome: No Change  Pt on alarms. Pt mentation fluctuates. Can call appropriately. Educated on call light. Call light within reach. Can make needs known. Assist x1 with gait belt and walker.      Problem: Adult Inpatient Plan of Care  Goal: Absence of Hospital-Acquired Illness or Injury  Monitoring Hgb 8.6 and 8.9. GI consult aware of pos guaic and will continue to monitor. Dark green stools. IV abx: zosyn. Oral abx : doxy. IV vanofen administered. Sputum culture was not obtained due to no cough or sputum. [facility] bed [facility] for pt.

## 2021-07-25 NOTE — PROGRESS NOTES
[facility] MEDICINE PROGRESS NOTE      Identification/Summary: Dread Justice is a 86 year old male with a past medical history of history of COPD, left cerebellar stroke, mild right-sided weakness, peptic ulcer disease ,legally blind, left total knee arthroplasty 7/7, was rehabbing at TCU came to the emergency department with left knee pain, redness secondary to cellulitis and found to have pneumonia.  Started on IV Zosyn and doxycycline.  Symptoms started improving.  Remains hemodynamically stable.  No need for further orthopedic procedure is superficial cellulitis improving with antibiotic.  Received 2 units of packed RBC transfusion with hemoglobin 6.5-->8.6.  He has dark stool, on iron supplement.  Hemoglobin remained stable after transfusion, suggesting against active bleeding. He has been on recent NSAIDS, raising concern for gastritis/peptic ulcer disease. However, in the absence of clear, gross bleeding, there is no urgency for endoscopic evaluation.  Evaluated by gastroenterology.  Chronic iron deficiency anemia.  Getting IV iron in the hospital.  Continue monitoring of hemoglobin and IV antibiotic today.  Anticipated discharge to TCU in 1 day    Assessment and Plan:  Pneumonia, ?  Healthcare associated  Chest x-ray-mixed interstitial and alveolar infiltrate involving left lung base  IV Zosyn and doxycycline  Repeat blood culture and sputum culture    Leg cellulitis, superficial  Left total knee arthroplasty on 7/7  Resume IV Zosyn  Left knee erythema, swelling are much improved  No need for further orthopedic procedure at this time  Appreciate orthopedic surgery consult    Acute blood loss anemia  Hemoglobin 6.5-->8.6  S/P 2 units of packed RBC transfusion    Hypertension  Metoprolol XL 25 mg daily started on 7/24  Monitor blood pressure closely    Dementia, continue supportive care    CKD 3  Renal function is stable    Depression and anxiety  Lexapro 20 mg daily    History of  left cerebellar stroke    COPD, stable    Code full  DVT prophylaxis  Early ambulation and SCDs   Aspirin 81 mg twice a day    Barrier to discharge  Continue IV antibiotic.  Anticipated discharge in 1 day to tcu    Interval History/Subjective:  Resting comfortably.  Left knee pain, swelling, redness are much improved.  Afebrile.  Denies cough.  Has intermittent confusion.  Has underlying dementia.    Review of system  Rest of the review of system are negative except HPI.  Denies headache, chest pain, breathing difficulty, palpitation, nausea, vomiting, abdominal pain or urinary symptoms.    Physical Exam/Objective:  Temp:  [97.5  F (36.4  C)-98.9  F (37.2  C)] 98.2  F (36.8  C)  Pulse:  [59-80] 72  Resp:  [16-18] 18  BP: ()/(55-69) 154/69  SpO2:  [93 %-97 %] 96 %    Body mass index is 25.15 kg/m .    GENERAL:  Alert, appears comfortable, in no acute distress, appears stated age   HEAD:  Normocephalic, without obvious abnormality, atraumatic   EYES:  PERRL, conjunctiva  clear,  EOM's intact   NOSE: Nares normal,  mucosa normal, no drainage   THROAT: Lips, mucosa,  gums normal, mouth moist   NECK: Supple, symmetrical, trachea midline   BACK:   Symmetric, no curvature, ROM normal   LUNGS:   Clear to auscultation bilaterally, no rales, rhonchi, or wheezing, symmetric chest rise on inhalation, respirations unlabored   CHEST WALL:  No tenderness or deformity   HEART:  Regular rate and rhythm, S1 and S2 normal, no murmur, rub, or gallop    ABDOMEN:   Soft, non-tender, bowel sounds active , no masses, no organomegaly, no rebound or guarding   EXTREMITIES: Left knee erythema and swelling are much improved, warm to touch, previous left knee arthroplasty   SKIN: Left knee erythema is much improved   NEURO: Alert, oriented x3    PSYCH: Cooperative, behavior is appropriate      Medications:   Personally Reviewed.  Medications       acetaminophen  975 mg Oral TID     albuterol  2 puff Inhalation TID     aspirin  81 mg Oral  BID     cyanocobalamin  1,000 mcg Oral QPM     doxycycline hyclate  100 mg Oral BID     escitalopram  20 mg Oral Daily     hydrOXYzine  25 mg Oral TID     lactobacillus rhamnosus (GG)  1 capsule Oral TID AC     metoprolol succinate ER  25 mg Oral Daily     pantoprazole (PROTONIX) IV  40 mg Intravenous Daily with breakfast     piperacillin-tazobactam  3.375 g Intravenous Q8H     sodium chloride (PF)  3 mL Intracatheter Q8H       Data reviewed today: I personally reviewed all new medications, labs, imaging/diagnostics reports over the past 24 hours. Pertinent findings include:    Imaging:   No results found for this or any previous visit (from the past 24 hour(s)).    Labs:  Most Recent 3 CBC's:  Recent Labs   Lab Test 07/25/21  0623 07/24/21  0710 07/23/21  1459 07/23/21  0508   WBC  --  8.7 9.2 9.6   HGB 8.6* 6.5* 7.1* 7.0*   MCV  --  94 94 96   PLT  --  819* 884* 876*     Most Recent 3 BMP's:  Recent Labs   Lab Test 07/25/21  0623 07/24/21  0710 07/23/21  1459 07/23/21  0508   NA  --  140 138 138   POTASSIUM  --  4.3 4.6 4.3   CHLORIDE  --  109* 105 104   CO2  --  21* 22 22   BUN  --  45* 52* 50*   CR 1.70* 1.82* 2.08* 2.02*   ANIONGAP  --  10 11 12   DELIO  --  8.2* 8.4* 8.5   GLC  --  94 106 92       Monika Huitron MD  [facility] Medicine  [facility]  Phone: #[phone]

## 2021-07-25 NOTE — PROGRESS NOTES
Chart reviewed. Patient recently admitted at [facility] (for knee replacement) and discharged 7/13 to [facility] via stretcher (cognitive decline). Friend Moi [phone] is friend that typically involved in assisting patient at home.     CM spoke to Eloy at [facility]CU to inquire about bed hold; he needs to return call to CM to confirm.     Of note, patient has [facility] Medicare Advantage Plan and anticipate need for insurance prior authorization for return to TCU.     10:17 AM  Per Del in admissions, patient discharged from [facility] per family request, to [facility] on 7/22.     CM spoke to Kaitlyn in admissions at [facility] ([phone]) who confirms patient has bed hold and is able to return. Will need to double check with regular admissions staff (Trevon) tomorrow re: if another [facility] insurance prior authorization is required prior to return. Kaitlyn also mention that patient friend/POA Moi is currently in the ICU at the [facility] and Moi wife and son plan to connect with [facility] to assist in discharge planning.

## 2021-07-26 NOTE — PLAN OF CARE
Problem: Risk for Delirium  Goal: Improved Behavioral Control  Outcome: Improving  Goal: Improved Sleep  Outcome: Improving   Problem: Pain Acute  Goal: Acceptable Pain Control and Functional Ability  Outcome: Improving     Problem: Fall Injury Risk  Goal: Absence of Fall and Fall-Related Injury  Outcome: Improving  Intervention: Promote Injury-Free Environment  Recent Flowsheet Documentation  Taken 7/26/2021 0823 by Imelda Petersen RN  Safety Promotion/Fall Prevention: bed alarm on     Problem: Adult Inpatient Plan of Care  Goal: Absence of Hospital-Acquired Illness or Injury  Intervention: Identify and Manage Fall Risk  Recent Flowsheet Documentation  Taken 7/26/2021 0823 by Imelda Petersen RN  Safety Promotion/Fall Prevention: bed alarm on   Patient slept on and off in the morning of the shift.  Ate good amount of breakfast and lunch.  BM x2 this shift.  Dark, formed.  Patient c/o pain at rectum and with wiping.  Barrier cream applied.  Voided this shift, but missed hat.  IV antibiotics.  PT and OT working with patient.  1:1 sitter removed at 1030 today.  Alarms in place for safety.

## 2021-07-26 NOTE — PROGRESS NOTES
"Orthopedic Progress Note      Assessment:    Left knee cellulitis  S/p L TKA on 7/7    Plan:   - Continue PT/OT  - Weightbearing status: WBAT  - Anticoagulation: ASA 81 PO BID in addition to SCDs, zainab stockings and early ambulation.  - Left knee redness/swelling is improving on abx. He does not complain of pain with ROM. No indication for surgery at this time. Ortho will sign off. Please reach with any questions or concerns moving forward.     Subjective:  Pain: mild  Nausea, Vomiting:  No  Lightheadedness, Dizziness:  No  Neuro:  Patient denies new onset numbness or paresthesias    Patient is doing okay today. Complains of some pain in his knee but states it is getting better. He has some confusion at baseline.    Objective:  BP (!) 156/73 (BP Location: Left arm)   Pulse 87   Temp 98.8  F (37.1  C) (Oral)   Resp 18   Ht 1.676 m (5' 6\")   Wt 72.7 kg (160 lb 3.2 oz)   SpO2 95%   BMI 25.86 kg/m    The patient appears comfortable.   Sensation is intact.  Erythema and warmth of the left knee  Well healing incision without drainage  Mild TTP of the left knee  Flexion and extension of the knee without significant pain  Dorsiflexion and plantar flexion is intact.  Dorsalis pedis pulse intact.  Calves are soft and non-tender. Negative Denise's.    Pertinent Labs   Lab Results: personally reviewed.   No results found for: INR, PROTIME  Lab Results   Component Value Date    WBC 8.7 07/24/2021    HGB 8.4 (L) 07/26/2021    HCT 20.4 (L) 07/24/2021    MCV 94 07/24/2021     (H) 07/24/2021     Lab Results   Component Value Date     07/24/2021    CO2 21 (L) 07/24/2021         Report completed by:  Aidee Contreras PA-C, SHARIFA  Date: 7/26/2021  Time: 1:32 PM    "

## 2021-07-26 NOTE — PROGRESS NOTES
Care Management Follow Up    Length of Stay (days): 3    Expected Discharge Date: 07/27/2021     Concerns to be Addressed:       Patient plan of care discussed at interdisciplinary rounds: Yes    Anticipated Discharge Disposition: Return to TCU      Additional Information:  CM spoke with provider who states that the Pt should be okay to discharge for tomorrow. CM called [facility] and spoke with staff member  who was covering for the admissions person. CM was stated to call back later to speak with Trevon. CM called back later and reached .     PCS as needed.     Needs to have confirm with TCU on discharge time currently set for 11AM for [facility] for 7/27 for confusion. CM to follow up in AM      CARRIE Mujica

## 2021-07-26 NOTE — PROGRESS NOTES
"[facility] Progress Note     IMPRESSION:  85yo male with PMH of COPD, prior CVA on aspirin, CKD, chronic anemia (baseline 8) who was admitted on 7/23/21 for left knee cellulitis after having TKA on 7/7. GI consulted for anemia with hgb drop to 6.5 and FOBT positive.     1. Acute on chronic anemia - no clear GI bleeding. Stools are dark but he is on iron. Hgb has remained stable after transfusions x2 (hgb at 8.4 today). Prior endoscopic eval in 2012 for anemia and iron studies suggestive of anemia of chronic disease; EGD showing reactive gastropathy, H pylori negative; colonoscopy showing adenoma x1, hemorrhoids, and diverticulosis. He has been on recent NSAID use.   - 7/25 received iron infusion.     2. Knee cellulitis  3. Pneumonia    - Abx with IV Zosyn and doxy. Blood cultures and sputum culture pending.     RECOMMENDATIONS:  - Continue to monitor hgb and for signs of GIbleding.  - Continue pantoprazole  - Stop NSAIDs besides aspirin (for hx of CVA and recent orthopedic surgery)  - Diet: as tolerated  - No plans for EGD at this time without evidence of overt GI bleeding.     GI will sign off at this time given that the patient's hgb is stable. If his condition changes or has angus GI bleeding please let us know and we can reconsider endoscopic evaluation if patient is agreeable. Thank you for consulting us on this pleasant patient. Please call if questions arise or the patient's status changes.       Russell Ryan PA-C  [facility]  [phone]  ________________________________________________________________________      SUBJECTIVE:    No symptoms this morning. No abdominal pains, nausea, or vomiting. No evidence of bleeding.  Nursing reports BM overnight without s/sx bloody stools or melena. Hgb stable this morning.      OBJECTIVE:  BP (!) 156/73 (BP Location: Left arm)   Pulse 87   Temp 98.8  F (37.1  C) (Oral)   Resp 18   Ht 1.676 m (5' 6\")   Wt 72.7 kg (160 lb 3.2 oz)   SpO2 " 95%   BMI 25.86 kg/m    Temp (24hrs), Av.1  F (36.7  C), Min:96.6  F (35.9  C), Max:98.8  F (37.1  C)    Patient Vitals for the past 72 hrs:   Weight   21 0633 72.7 kg (160 lb 3.2 oz)   21 0545 70.7 kg (155 lb 12.8 oz)   21 0200 72.4 kg (159 lb 9.6 oz)   21 0645 71.1 kg (156 lb 12.8 oz)       Intake/Output Summary (Last 24 hours) at 2021 0746  Last data filed at 2021 0743  Gross per 24 hour   Intake 660 ml   Output 350 ml   Net 310 ml        PHYSICAL EXAM  GEN: Alert, oriented x3, communicative and in NAD.    LYMPH: No LAD noted.  HRT: RRR  LUNGS: CTA  ABD:  ND, +BS, no guarding or pain to palpation, no rebound, no HSM.  SKIN: No rash, jaundice or spider angiomata      LABS:  I have reviewed the patient's new clinical lab results:     Recent Labs   Lab Test 21  0626 21  1437 21  0623 21  0710 21  1459 21  0508   WBC  --   --   --  8.7 9.2 9.6   HGB 8.4* 8.9* 8.6* 6.5* 7.1* 7.0*   MCV  --   --   --  94 94 96   PLT  --   --   --  819* 884* 876*     Recent Labs   Lab Test 21  0710 21  1459 21  0508   POTASSIUM 4.3 4.6 4.3   CHLORIDE 109* 105 104   CO2 21* 22 22   BUN 45* 52* 50*   ANIONGAP 10 11 12     No lab results found.    Invalid input(s): ALKPHOSPH    IMAGING:  Reviewed

## 2021-07-26 NOTE — PLAN OF CARE
Problem: Risk for Delirium  Goal: Optimal Coping  Outcome: Change based on patient need/priority   Patient acting paranoid, impulsive and restless. Very unsteady gait. Confused and forgetful. 1:1 sitter

## 2021-07-26 NOTE — PROGRESS NOTES
[facility] MEDICINE PROGRESS NOTE      Identification/Summary: Dread Justice is a 86 year old male with a past medical history of history of COPD, left cerebellar stroke, mild right-sided weakness, peptic ulcer disease ,legally blind, left total knee arthroplasty 7/7, [facility] came to the emergency department with left knee pain, redness secondary to cellulitis and found to have pneumonia.  Started on IV Zosyn and doxycycline.  Symptoms started improving.  Remains hemodynamically stable.  No need for further orthopedic procedure. He has superficial cellulitis which is improving with antibiotic.  Received 2 units of packed RBC transfusion with hemoglobin 6.5-->8.6.  He has dark stool, FOBT positive,on iron supplement. S/P IV iron in the hospital.   Hemoglobin remained stable after transfusion, suggesting against active bleeding. He has been on recent NSAIDS, raising concern for gastritis/peptic ulcer disease. However, in the absence of clear, gross bleeding, there is no urgency for endoscopic evaluation. Had EGD and colonoscopy in 2012.  Had reactive gastropathy on EGD, H. pylori negative, colonoscopy revealed one adenoma, hemorrhoids and diverticulosis.  Has recent NSAID and aspirin use.  Will stop NSAID beside aspirin for history of CVA and recent orthopedic surgery.  Continue PPI.  Evaluated by gastroenterology.      He has intermittent agitation and confusion without focal deficit.  Has underlying dementia.  History of left cerebellar stroke.    Assessment and Plan:  Pneumonia, ?  Healthcare associated  Chest x-ray-mixed interstitial and alveolar infiltrate involving left lung base  IV Zosyn and doxycycline  Blood culture have no growth until today  No respiratory symptoms    Leg cellulitis, superficial  Left total knee arthroplasty on 7/7  Resume IV Zosyn  Left knee erythema, swelling are much improved  No need for further orthopedic procedure at this time  Appreciate  orthopedic surgery consult    Acute blood loss anemia  Iron deficiency anemia  Hemoglobin 6.5-->8.6  S/P 2 units of packed RBC transfusion  Status post IV iron    Hypertension  Metoprolol XL 25 mg daily started on 7/24  Monitor blood pressure closely    Acute metabolic encephalopathy/delirium  Seroquel 25 mg twice a day  No focal deficit  History of left cerebellar stroke  Dementia, continue supportive care    CKD 3  Renal function is stable    Depression and anxiety  Lexapro 20 mg daily    COPD, stable    Code full  DVT prophylaxis  Early ambulation and SCDs   Aspirin 81 mg twice a day    Barrier to discharge  Had significant agitation since yesterday afternoon required one-to-one supervision.  Anticipated discharge in 1 day to tcu    Interval History/Subjective:  Sitting comfortably.  Ate breakfast well.  Required one-to-one supervision last night.  Has intermittent confusion and agitation.  Has underlying dementia.  Afebrile.    Review of system  Rest of the review of system are negative except HPI.  Denies headache, chest pain, breathing difficulty, palpitation, nausea, vomiting, abdominal pain or urinary symptoms.    Physical Exam/Objective:  Temp:  [96.6  F (35.9  C)-98.8  F (37.1  C)] 98.8  F (37.1  C)  Pulse:  [65-87] 87  Resp:  [16-23] 18  BP: (115-156)/(56-73) 156/73  SpO2:  [92 %-95 %] 95 %    Body mass index is 25.86 kg/m .    GENERAL:  Alert, somnolent, easy to arouse, in no acute distress, appears stated age   HEAD:  Normocephalic, without obvious abnormality, atraumatic   EYES:  PERRL, conjunctiva  clear,  EOM's intact   NOSE: Nares normal,  mucosa normal, no drainage   THROAT: Lips, mucosa,  gums normal, mouth moist   NECK: Supple, symmetrical, trachea midline   BACK:   Symmetric, no curvature, ROM normal   LUNGS:   Clear to auscultation bilaterally, no rales, rhonchi, or wheezing, symmetric chest rise on inhalation, respirations unlabored   CHEST WALL:  No tenderness or deformity   HEART:  Regular  rate and rhythm, S1 and S2 normal, no murmur, rub, or gallop    ABDOMEN:   Soft, non-tender, bowel sounds active , no masses, no organomegaly, no rebound or guarding   EXTREMITIES: Left knee erythema and swelling are much improved, less warm to touch, previous left knee arthroplasty   SKIN: Left knee erythema is much improved   NEURO: Alert, somnolent, easy to arouse   PSYCH: Cooperative, behavior is appropriate      Medications:   Personally Reviewed.  Medications       acetaminophen  975 mg Oral TID     albuterol  2 puff Inhalation TID     aspirin  81 mg Oral BID     cyanocobalamin  1,000 mcg Oral QPM     doxycycline monohydrate  100 mg Oral BID 09 12     escitalopram  20 mg Oral Daily     hydrOXYzine  25 mg Oral TID     lactobacillus rhamnosus (GG)  1 capsule Oral TID AC     metoprolol succinate ER  25 mg Oral Daily     [START ON 7/27/2021] pantoprazole  40 mg Oral QAM AC     piperacillin-tazobactam  3.375 g Intravenous Q8H     QUEtiapine  25 mg Oral BID     sodium chloride (PF)  3 mL Intracatheter Q8H       Data reviewed today: I personally reviewed all new medications, labs, imaging/diagnostics reports over the past 24 hours. Pertinent findings include:    Imaging:   No results found for this or any previous visit (from the past 24 hour(s)).    Labs:  Most Recent 3 CBC's:  Recent Labs   Lab Test 07/26/21  0626 07/25/21  1437 07/25/21  0623 07/24/21  0710 07/23/21  1459 07/23/21  0508   WBC  --   --   --  8.7 9.2 9.6   HGB 8.4* 8.9* 8.6* 6.5* 7.1* 7.0*   MCV  --   --   --  94 94 96   PLT  --   --   --  819* 884* 876*     Most Recent 3 BMP's:  Recent Labs   Lab Test 07/25/21  0623 07/24/21  0710 07/23/21  1459 07/23/21  0508   NA  --  140 138 138   POTASSIUM  --  4.3 4.6 4.3   CHLORIDE  --  109* 105 104   CO2  --  21* 22 22   BUN  --  45* 52* 50*   CR 1.70* 1.82* 2.08* 2.02*   ANIONGAP  --  10 11 12   DELIO  --  8.2* 8.4* 8.5   [facility]  --  94 106 92       Monika Huitron MD  [facility]  [facility]  Phone: #[phone]

## 2021-07-26 NOTE — PLAN OF CARE
Problem: Fall Injury Risk  Goal: Absence of Fall and Fall-Related Injury  Outcome: Improving  Intervention: Promote Injury-Free Environment  Recent Flowsheet Documentation  Taken 7/26/2021 0000 by María Gautam RN  Safety Promotion/Fall Prevention:   bed alarm on   clutter free environment maintained   room door open     Sitter in place due to impulsiveness. Patient was also showing signs of paranoia on evenings. Patient did well overnight. Pt had a few Bms, assist of 1 with changing. Bladder scanned patient 2 times during shift, 408 and 615ml. Pt is having incontinent voids. MD Aware, continue to monitor.

## 2021-07-27 NOTE — PLAN OF CARE
Occupational Therapy Discharge Summary    Reason for therapy discharge:    Discharged to transitional care facility.    Progress towards therapy goal(s). See goals on Care Plan in [facility] electronic health record for goal details.  Goals not met.  Barriers to achieving goals:   discharge from facility.    Therapy recommendation(s):    Continued therapy is recommended.  Rationale/Recommendations:  for ADLs and cogntion.

## 2021-07-27 NOTE — PLAN OF CARE
Physical Therapy Discharge Summary    Reason for therapy discharge:    Discharged to transitional care facility.    Progress towards therapy goal(s). See goals on Care Plan in [facility] electronic health record for goal details.  Goals not met.  Barriers to achieving goals:   limited tolerance for therapy.    Therapy recommendation(s):    Continued therapy is recommended.  Rationale/Recommendations:  Patient is weak and has decreased balance.

## 2021-07-27 NOTE — PLAN OF CARE
"Problem: Skin or Soft Tissue Infection  Goal: Infection Symptom Resolution  Outcome: Improving  Intervention: Provide Meticulous Infection Site Care  Recent Flowsheet Documentation  Taken 7/27/2021 0013 by Tatianna Anguiano RN  Infection Prevention:    environmental surveillance performed    rest/sleep promoted     Problem: Pain Acute  Goal: Acceptable Pain Control and Functional Ability  Outcome: Improving     Pt is alert but disorientated to time, place, situation. Assist x1-2 with gait belt and walker. Safety precautions in place. Alarms on. Gait unsteady. VSS. /61 (BP Location: Left arm)   Pulse 65   Temp 97.4  F (36.3  C) (Oral)   Resp 20   Ht 1.676 m (5' 6\")   Wt 72.7 kg (160 lb 3.2 oz)   SpO2 96%   BMI 25.86 kg/m    Complains of groin and perineum pain. Barrier cream applied. Penile discharged noted, see previous note. Groin and perineum is reddened. Repositioned q2h to prevent skin breakdown. Scattered bruising. Left knee is swollen and reddened in color. Extremity was elevated. Poor intake. Will continue to monitor.    Tatianna Anguiano RN, 7/27/2021 7:01 AM    "

## 2021-07-27 NOTE — PLAN OF CARE
Problem: Adult Inpatient Plan of Care  Goal: Plan of Care Review  Outcome: Improving  Problem: Adult Inpatient Plan of Care  Goal: Absence of Hospital-Acquired Illness or Injury  Intervention: Identify and Manage Fall Risk  Recent Flowsheet Documentation  Taken 7/26/2021 1600 by Alan Cochran RN  Safety Promotion/Fall Prevention:   bed alarm on   chair alarm on  Pt remained calm and cooperative to cares, denied pain, nausea or vomiting, set off alarms several times due to needing the restroom, easily redirect, ambulate with gait belt, walker requiring A1-2, pt fall risk, unsteady, all alarms on at all times, pt self removed IV, SWAT nurse replaced, sleeve on, continue rounding and monitor pt status.

## 2021-07-27 NOTE — TELEPHONE ENCOUNTER
[facility] Nurse Triage Telephone Note    Provider: Jared Jose DO  Facility: [facility] Facility Type:  TCU    Caller: Ilda  Call Back Number: [phone]    Allergies:    Allergies   Allergen Reactions     Aspirin      Other reaction(s): unknown     Ibuprofen Rash     Tolerated 15mg IV toradol on 5/18/19, no rash        Reason for call: Pt just re-admitted today from hospital. He has had 4 Black with angus blood, slimy loose stools. Pt very unhappy. He had 1 unit blood in hospital    Verbal Order/Direction given by Provider: Send back to ER due to black loose stools & angus blood.    Provider giving Order:  Jared Jose DO    Verbal Order given to: Ilda Wade RN

## 2021-07-27 NOTE — DISCHARGE SUMMARY
[facility] MEDICINE  DISCHARGE SUMMARY     Primary Care Physician: Greg Carroll  Admission Date: 7/23/2021   Discharge Provider: Monika Huitron MD Discharge Date: 7/27/2021   Diet:   Active Diet and Nourishment Order   Procedures     Regular Diet Adult     Advance Diet as Tolerated       Code Status: Full Code   Activity: DCACTIVITY: Activity as tolerated        Condition at Discharge: Good     REASON FOR PRESENTATION(See Admission Note for Details)   Worsening left knee pain and redness    PRINCIPAL & ACTIVE DISCHARGE DIAGNOSES     Left knee cellulitis, superficial  Left total knee arthroplasty on 7/7  Pneumonia  Acute blood loss anemia  Iron deficiency anemia  Essential hypertension  Acute metabolic encephalopathy/delirium  History of left cerebellar stroke  Dementia  Acute kidney injury on CKD 3  Depression and anxiety  Legally blind    PENDING LABS     Unresulted Labs Ordered in the Past 30 Days of this Admission     Date and Time Order Name Status Description    7/23/2021  3:21 PM Blood Culture Peripheral Blood Preliminary     7/23/2021  3:21 PM Blood Culture Peripheral Blood Preliminary             PROCEDURES ( this hospitalization only)      None    RECOMMENDATIONS TO OUTPATIENT PROVIDER FOR F/U VISIT     Follow-up Appointments     Follow Up and recommended labs and tests      Follow up with orthopedics in 10 day  Follow up with TCU physicina in next rounding  Follow up with PMD in 1 week after discharge from TCU  Cbc, bmp in 1 week  Follow up with GI as needed             DISPOSITION     Skilled Nursing Facility    SUMMARY OF HOSPITAL COURSE:      Mr.Earl KITA Justice is a 86 year old male with a past medical history of history of COPD, left cerebellar stroke, mild right-sided weakness, peptic ulcer disease ,legally blind, left total knee arthroplasty 7/7, was rehabbing at TCU came to the emergency department with left knee pain, redness secondary to cellulitis and  found to have pneumonia. (There was miscommunication for positive blood culture at TCU ) Started on IV Zosyn and doxycycline.  Symptoms started improving.  Remains hemodynamically stable.  No need for further orthopedic procedure as he has superficial cellulitis which is improving with antibiotic.  Resume routine postoperative care and follow-up with orthopedic surgery as scheduled. He is rehabbing at TCU.    Chest x-ray-mixed interstitial and alveolar infiltrate involving left lung base.  He did not have respiratory symptoms.  Blood culture have no growth until the day of discharge.  Treated with antibiotic empirically.      He to have anemia with hemoglobin 6.5.  Received 2 units of packed RBC transfusion.  Hemoglobin remained stable at 8.  He had dark stool, FOBT positive,on iron supplement. Hemoglobin remained stable after transfusion, suggesting against active bleeding. He has been on recent NSAIDS, raising concern for gastritis/peptic ulcer disease. However, in the absence of clear, gross bleeding, there was no urgency for endoscopic evaluation. Had EGD and colonoscopy in 2012.  Had reactive gastropathy on EGD, H. pylori negative, colonoscopy revealed one adenoma, hemorrhoids and diverticulosis.   Will stop NSAID beside aspirin for history of CVA and recent orthopedic surgery. Continue PPI. Received IV iron.  Will resume oral iron supplement. Evaluated by gastroenterology.     He had confusion and agitation secondary to delirium from recent hospitalization.  Did not have focal deficit.  History of left cerebellar stroke.  Seroquel 25 mg twice a day as needed.  He returns to his baseline prior to discharge.     Metoprolol XL 25 mg daily started for hypertension.    Discharge Medications with Med changes:     Current Discharge Medication List      START taking these medications    Details   doxycycline monohydrate (MONODOX) 100 MG capsule Take 1 capsule (100 mg) by mouth 2 times daily for 7 days  Qty: 14  capsule, Refills: 0    Associated Diagnoses: Pneumonia of left lower lobe due to infectious organism      lactobacillus rhamnosus, GG, (CULTURELL) capsule Take 1 capsule by mouth 2 times daily for 7 days  Qty: 14 capsule, Refills: 0    Associated Diagnoses: Pneumonia of left lower lobe due to infectious organism      melatonin 1 MG TABS tablet Take 1 tablet (1 mg) by mouth nightly as needed for sleep      metoprolol succinate ER (TOPROL-XL) 25 MG 24 hr tablet Take 1 tablet (25 mg) by mouth daily  Qty: 30 tablet, Refills: 1    Associated Diagnoses: Essential hypertension      nystatin (MYCOSTATIN) 240167 UNIT/GM external cream Apply topically 2 times daily Apply to groin rash until resolved  Qty: 30 g, Refills: 0    Associated Diagnoses: Dysuria      pantoprazole (PROTONIX) 40 MG EC tablet Take 1 tablet (40 mg) by mouth every morning (before breakfast)  Qty: 30 tablet, Refills: 0    Associated Diagnoses: Gastroesophageal reflux disease with esophagitis without hemorrhage      QUEtiapine (SEROQUEL) 25 MG tablet Take 1 tablet (25 mg) by mouth 2 times daily as needed (agitation)  Qty: 20 tablet, Refills: 0    Associated Diagnoses: Agitation      tamsulosin (FLOMAX) 0.4 MG capsule Take 1 capsule (0.4 mg) by mouth daily  Qty: 30 capsule, Refills: 0    Associated Diagnoses: Dysuria         CONTINUE these medications which have CHANGED    Details   oxyCODONE (ROXICODONE) 5 MG tablet Take 1 tablet (5 mg) by mouth every 4 hours as needed for pain  Qty: 20 tablet, Refills: 0    Associated Diagnoses: Pain and swelling of left knee         CONTINUE these medications which have NOT CHANGED    Details   acetaminophen (TYLENOL) 325 MG tablet Take 975 mg by mouth 3 times daily      !! albuterol (PROAIR HFA/PROVENTIL HFA/VENTOLIN HFA) 108 (90 Base) MCG/ACT inhaler Inhale 2 puffs into the lungs 3 times daily      !! albuterol (PROAIR HFA/PROVENTIL HFA/VENTOLIN HFA) 108 (90 Base) MCG/ACT inhaler Inhale 2 puffs into the lungs every 6  hours as needed       aspirin (ASA) 81 MG chewable tablet Take 81 mg by mouth 2 times daily      escitalopram (LEXAPRO) 20 MG tablet Take 20 mg by mouth daily      ferrous sulfate (FE TABS) 325 (65 Fe) MG EC tablet Take 325 mg by mouth 2 times daily      !! hydrOXYzine (ATARAX) 25 MG tablet Take 25 mg by mouth 3 times daily      !! hydrOXYzine (ATARAX) 25 MG tablet Take 25 mg by mouth every 8 hours as needed for itching      potassium chloride ER (KLOR-CON M) 10 MEQ CR tablet Take 10 mEq by mouth 2 times daily      torsemide (DEMADEX) 20 MG tablet Take 20 mg by mouth 2 times daily      vitamin B-12 (CYANOCOBALAMIN) 1000 MCG tablet Take 1,000 mcg by mouth every evening       !! - Potential duplicate medications found. Please discuss with provider.                Rationale for medication changes:      Doxycycline for cellulitis and pneumonia  PPI for possible GERD  Metoprolol for hypertension  Seroquel for agitation          Consults       SOCIAL WORK IP CONSULT  ORTHOPEDIC SURGERY IP CONSULT  INFECTIOUS DISEASES IP CONSULT  PHARMACY TO DOSE VANCO  PHYSICAL THERAPY ADULT IP CONSULT  OCCUPATIONAL THERAPY ADULT IP CONSULT  GASTROENTEROLOGY IP CONSULT  PHYSICAL THERAPY ADULT IP CONSULT  OCCUPATIONAL THERAPY ADULT IP CONSULT    Immunizations given this encounter       There is no immunization history on file for this patient.        Anticoagulation Information    N/A    SIGNIFICANT IMAGING FINDINGS     Results for orders placed or performed during the hospital encounter of 07/23/21   XR Knee Left 3 Views    Impression    IMPRESSION: Postoperative changes of left total knee arthroplasty. The components appear well seated. No evidence for fracture. There is a small left knee joint effusion. There is fairly extensive swelling external and anterior to the knee joint within   the subcutaneous soft tissues. There is also a lesion within the distal femur which most likely represents an enchondroma.   US Lower Extremity Venous  Duplex Left    Impression    IMPRESSION:  1.  No deep venous thrombosis in the left lower extremity.   XR Chest 1 View    Impression    IMPRESSION: Heart and mediastinal size are normal. There is mixed interstitial and alveolar infiltrate involving the left lung base, compatible with pneumonia. Elsewhere is diffuse indistinctness of the pulmonary interstitium, likely representing airway   inflammation. No pleural effusion or pneumothorax.       SIGNIFICANT LABORATORY FINDINGS     Hgb 6.5-->8.4  Blood culture 7/23 have no growth  Total iron 32   CR 2-->1.70  Discharge Orders        General info for SNF    Length of Stay Estimate: Short Term Care: Estimated # of Days <30  Condition at Discharge: Stable  Level of care:skilled   Rehabilitation Potential: Good  Admission H&P remains valid and up-to-date: Yes  Recent Chemotherapy: N/A  Use Nursing Home Standing Orders: Yes     Mantoux instructions    Give two-step Mantoux (PPD) Per Facility Policy yes, if not done recently     Follow Up and recommended labs and tests    Follow up with orthopedics in 10 day  Follow up with TCU physicina in next rounding  Follow up with PMD in 1 week after discharge from TCU  Cbc, bmp in 1 week  Follow up with GI as needed     Reason for your hospital stay    Worsening left knee pain     Activity - Up ad lennie     Physical Therapy Adult Consult    Evaluate and treat as clinically indicated.    Reason:  knee arthroplasty     Occupational Therapy Adult Consult    Evaluate and treat as clinically indicated.    Reason:  knee arthroplasty     Fall precautions     Advance Diet as Tolerated    Follow this diet upon discharge: regular       Examination   Physical Exam   Temp:  [97.4  F (36.3  C)-98.1  F (36.7  C)] 98.1  F (36.7  C)  Pulse:  [52-79] 79  Resp:  [16-20] 16  BP: (114-142)/(61-79) 142/65  SpO2:  [96 %-97 %] 96 %  Wt Readings from Last 1 Encounters:   07/26/21 72.7 kg (160 lb 3.2 oz)     GENERAL:  Alert, somnolent, easy to arouse, in no  acute distress, appears stated age   HEAD:  Normocephalic, without obvious abnormality, atraumatic   EYES:  PERRL, conjunctiva  clear,  EOM's intact   NOSE: Nares normal,  mucosa normal, no drainage   THROAT: Lips, mucosa,  gums normal, mouth moist   NECK: Supple, symmetrical, trachea midline   BACK:   Symmetric, no curvature, ROM normal   LUNGS:   Clear to auscultation bilaterally, no rales, rhonchi, or wheezing, symmetric chest rise on inhalation, respirations unlabored   CHEST WALL:  No tenderness or deformity   HEART:  Regular rate and rhythm, S1 and S2 normal, no murmur, rub, or gallop    ABDOMEN:   Soft, non-tender, bowel sounds active , no masses, no organomegaly, no rebound or guarding   EXTREMITIES: Left knee erythema and swelling are significantly improved, no warm to touch anymore, previous left knee arthroplasty   SKIN: Left knee erythema is much improved   NEURO: Alert, somnolent, easy to arouse   PSYCH: Cooperative, behavior is appropriate          Please see EMR for more detailed significant labs, imaging, consultant notes etc.    I, Monika Huitron MD, personally saw the patient today and spent greater than 30 minutes discharging this patient.    Monika Huitron MD  [facility]    CC:Greg Carroll

## 2021-07-27 NOTE — PROVIDER NOTIFICATION
Pt complaining of burning on urination and urgency. Penile discharge noted on assessment. MD notified. Orders for UA. Will continue to monitor.    Tatianna Anguiano RN, 7/27/2021 5:43 AM

## 2021-07-27 NOTE — PROGRESS NOTES
Care Management Discharge Note    Discharge Date: 07/27/2021  Expected Time of Departure: 1100    Discharge Disposition: Skilled Nursing Facilty, Transitional Care    Discharge Services: None    Discharge DME: None    Discharge Transportation: other (see comments) ([facility] stretcher)    PAS Confirmation Code:  (Not needed - return to facility)  Patient/family educated on Medicare website which has current facility and service quality ratings: no (Pt confused)    Persons Notified of Discharge Plans: TCU, Patient's friend, Moi  Patient/Family in Agreement with the Plan: yes    Additional Information:  A ride was set up on the previous day, but was not charted. The writer did not receive a call from [facility] to say they can take the patient back. However, since the stretcher ride had been scheduled, the EMS crew did show up to take the patient to TCU. The writer was able to talk to a RN at [facility], Kamlesh, who confirmed that the patient does have a room there. Kamlesh also said if orders were sent ASAP, they could take the patient. The writer also received a call from Trevon in admissions who said she was unaware the patient was coming to them, but that it was okay. The writer also called and updated the patient's contact, Moi, to notify him of the discharge.    Issac Rabago RN

## 2021-08-02 NOTE — LETTER
8/2/2021        RE: Dread Justice  [address]  [address]  [place] [address]        [facility] GERIATRIC SERVICES    Facility:  [facility] ([facility]) [[id number]]  Code Status: DNR      CHIEF COMPLAINT/REASON FOR VISIT:  Chief Complaint   Patient presents with     Hospital F/U       HPI:   Dread is a 86 year old male who residing here at the [facility] TCU undergoing physical and occupational therapy secondary hospitalization 7/7/2021 for left total knee arthroplasty.  He then went back to the hospital on 2321 secondary to cellulitis of the knee.  He was then treated with antibiotics at this time and this did to improve.  He also had blood loss anemia requiring 2 units of packed red blood cells hemoglobin dropped to 6.5.  And he did have delirium.  He was then discharged to the TCU on 7/27/2021.  He was sent back to the hospital same day for severe rectal bleeding according the nursing staff was very severe he was brought in the hospital hemoglobin still continued to be stable.  It was 8.8-8.1-8.5 on discharge.  EGD was discussed with patient and family and patient declined EGD.  Aspirin was continued and PPI was done twice daily for 8 weeks.    Patient continues Seroquel as needed does have a history of dementia and he did have diarrhea negative C. difficile and white count was normal.    He does have chronic kidney disease and renal function 1.88 torsemide was reduced to daily with potassium daily.  Bicarb improved to 19 and a follow-up basic metabolic profile was ordered.    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  He does a CODE STATUS of DNR however okay to intubate for healthcare directive.  Hemoglobin basic metabolic profile have been ordered for today but I did order for tomorrow if not.  Patient was treated appropriate transferred here to the TCU at [facility] in stable  condition.    Patient seen in his chair comfortably does not appear to be in acute distress.  He claims he is doing well he still getting something nystatin cream on his bottom which he claims is improving and doing well.  He says his pain is controlled with medications and when I review his med list on acetaminophen as needed however he does not want to ask for pain medication so I am going to schedule it every 6 hours.  Pain assessments will continue.    His lungs are clear but he remains on doxycycline 1 tablet by mouth 2 times a day for pneumonia and will continue that until complete.  Patient claims his mood his bowels well and urinating without difficulty.    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  MRI showed a 4.5 cm infarct in the left cerebellum.     Chronic headaches      COPD (chronic obstructive pulmonary disease) (H)      Depression      Iron deficiency anemia 10/03/2012     Macular degeneration, bilateral      Osteoarthritis      Peptic ulcer disease 1975    Secondary to alcohol use.     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  Diverticulosis, internal hemorrhoids.     ESOPHAGOSCOPY, GASTROSCOPY, DUODENOSCOPY (EGD), COMBINED  10/04/2012    Chronic gastritis.     INGUINAL HERNIA REPAIR Right 7/8/2014    Procedure: HERNIA REPAIR INGUINAL, RIGHT;  Surgeon: Mack Kowalski MD;  Location: [facility];  Service:      PICC  12/2/2014          TONSILLECTOMY  1949        Family History:   Family History   Problem Relation Age of Onset     Heart Failure Mother 93.00     Cerebrovascular Disease Father 79.00     Coronary Artery Disease Father      Cancer Sister 76.00     No Known Problems Son         Not in contact.     No Known Problems Daughter         Not in contact.       Social History:    Social History     Socioeconomic History     Marital status:      Spouse name: Not on file     Number of children: Not on file     Years of education: Not on file     Highest education level: Not on file   Occupational History     Not on file   Tobacco Use     Smoking status: Heavy Tobacco Smoker     Packs/day: 0.50     Types: Cigarettes     Smokeless tobacco: Never Used     Tobacco comment: per day   Substance and Sexual Activity     Alcohol use: No     Drug use: No     Sexual activity: Not on file   Other Topics Concern     Not on file   Social History Narrative    Lives with family      Social Determinants of Health     Financial Resource Strain:      Difficulty of Paying Living Expenses:    Food Insecurity:      Worried About Running Out of Food in the Last Year:      Ran Out of Food in the Last Year:    Transportation Needs:      Lack of Transportation (Medical):      Lack of Transportation (Non-Medical):    Physical Activity:      Days of Exercise per Week:      Minutes of Exercise per Session:    Stress:      Feeling of Stress :    Social Connections:      Frequency of Communication with Friends and Family:      Frequency of Social Gatherings with Friends and Family:      Attends [employer] Services:      Active Member of Clubs or Organizations:      Attends Club or Organization Meetings:      Marital Status:    Intimate Partner Violence:      Fear of Current or Ex-Partner:      Emotionally Abused:      Physically Abused:      Sexually Abused:        REVIEW OF SYSTEM: Patient claims he is having issues but the Tylenol is helping and he is having it as needed.  He denies any  fevers chills nausea vomit diarrhea change in vision hearing taste or smell weakness one-sided of the chest pain shortness of breath.  He denies any current shortness stool polyphagia polydipsia polyuria depression or anxiety the main review of systems is negative.      PHYSICAL EXAM: Patient is alert pleasant does not appear to be in acute distress.  Head was normocephalic and atraumatic sclera conjunctiva is clear oromucosa is moist nose no discharge.  Heart sounds were regular lungs were clear to auscultation without any crackles rales or wheezes.  Abdomen was soft nontender.    Extremities examination left knee did not show any signs of cyanosis or edema.  There is little inflammatory redness but did not look angry red at this time.  Incision is healed over pretty well.  Neurologic exam did show cognitive issues however the remainder of the neuro exam was nonfocal.  Affect was pleasant.        LABS: Labs from 723 was as follows hemoglobin was 7.0, white count was 9.6, platelets were 876.  Basic metabolic profile on that date was as follows sodium was 138, potassium 4.3, CO2 is 22, BUN is 50, creatinine 2.02, calcium is 8.5, GFR was 29.      ASSESSMENT:    Encounter Diagnoses   Name Primary?     Status post total left knee replacement Yes     Cellulitis of left knee      Gastrointestinal hemorrhage associated with acute gastritis      Anemia due to blood loss, acute      Chronic obstructive pulmonary disease, unspecified COPD type (H)      Acute metabolic encephalopathy      Stage 3a chronic kidney disease      Primary osteoarthritis of left knee         PLAN: Plan at this time OT will do cognitive testing at this time.  And continue with physical and Occupational Therapy secondary to his knee arthroplasty.  He does not have excellent range of motion on that at this time and that will need to be addressed.    CBC be done tomorrow if not done today and basic metabolic profile be done tomorrow secondary acute  kidney injury and anemia.    I will change his Tylenol 650 every 6 hours scheduled at this time without any addition of any other medications for his pain.    He is currently on Seroquel on next visit if he is not taken any for agitation I will DC that.  I will discuss with nurse if he is needing his Seroquel or not.    I will continue to monitor above medical problems and no other changes to care plan at this time.        Electronically signed by: LORI REYNAGA DO        Sincerely,        LORI REYNAGA DO

## 2021-08-02 NOTE — PROGRESS NOTES
[facility] GERIATRIC SERVICES    Facility:  [facility] (SNF) [[id number]]  Code Status: DNR      CHIEF COMPLAINT/REASON FOR VISIT:  Chief Complaint   Patient presents with     Hospital F/U       HPI:   Dread is a 86 year old male who residing here at the [facility] TCU undergoing physical and occupational therapy secondary hospitalization 7/7/2021 for left total knee arthroplasty.  He then went back to the hospital on 2321 secondary to cellulitis of the knee.  He was then treated with antibiotics at this time and this did to improve.  He also had blood loss anemia requiring 2 units of packed red blood cells hemoglobin dropped to 6.5.  And he did have delirium.  He was then discharged to the TCU on 7/27/2021.  He was sent back to the hospital same day for severe rectal bleeding according the nursing staff was very severe he was brought in the hospital hemoglobin still continued to be stable.  It was 8.8-8.1-8.5 on discharge.  EGD was discussed with patient and family and patient declined EGD.  Aspirin was continued and PPI was done twice daily for 8 weeks.    Patient continues Seroquel as needed does have a history of dementia and he did have diarrhea negative C. difficile and white count was normal.    He does have chronic kidney disease and renal function 1.88 torsemide was reduced to daily with potassium daily.  Bicarb improved to 19 and a follow-up basic metabolic profile was ordered.    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  He does a CODE STATUS of DNR however okay to intubate for healthcare directive.  Hemoglobin basic metabolic profile have been ordered for today but I did order for tomorrow if not.  Patient was treated appropriate transferred here to the TCU at [facility] in stable condition.    Patient seen in his chair comfortably does not appear to be in acute distress.  He claims he is doing  well he still getting something nystatin cream on his bottom which he claims is improving and doing well.  He says his pain is controlled with medications and when I review his med list on acetaminophen as needed however he does not want to ask for pain medication so I am going to schedule it every 6 hours.  Pain assessments will continue.    His lungs are clear but he remains on doxycycline 1 tablet by mouth 2 times a day for pneumonia and will continue that until complete.  Patient claims his mood his bowels well and urinating without difficulty.    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  MRI showed a 4.5 cm infarct in the left cerebellum.     Chronic headaches      COPD (chronic obstructive pulmonary disease) (H)      Depression      Iron deficiency anemia 10/03/2012     Macular degeneration, bilateral      Osteoarthritis      Peptic ulcer disease 1975    Secondary to alcohol use.     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  Diverticulosis, internal hemorrhoids.     ESOPHAGOSCOPY, GASTROSCOPY, DUODENOSCOPY (EGD), COMBINED  10/04/2012    Chronic gastritis.     INGUINAL HERNIA REPAIR Right 7/8/2014    Procedure: HERNIA REPAIR INGUINAL, RIGHT;  Surgeon: Mack Kowalski MD;  Location: [facility];  Service:      PICC  12/2/2014          TONSILLECTOMY  1949       Family History:   Family History   Problem Relation Age of Onset     Heart Failure Mother 93.00      Cerebrovascular Disease Father 79.00     Coronary Artery Disease Father      Cancer Sister 76.00     No Known Problems Son         Not in contact.     No Known Problems Daughter         Not in contact.       Social History:    Social History     Socioeconomic History     Marital status:      Spouse name: Not on file     Number of children: Not on file     Years of education: Not on file     Highest education level: Not on file   Occupational History     Not on file   Tobacco Use     Smoking status: Heavy Tobacco Smoker     Packs/day: 0.50     Types: Cigarettes     Smokeless tobacco: Never Used     Tobacco comment: per day   Substance and Sexual Activity     Alcohol use: No     Drug use: No     Sexual activity: Not on file   Other Topics Concern     Not on file   Social History Narrative    Lives with family      Social Determinants of Health     Financial Resource Strain:      Difficulty of Paying Living Expenses:    Food Insecurity:      Worried About Running Out of Food in the Last Year:      Ran Out of Food in the Last Year:    Transportation Needs:      Lack of Transportation (Medical):      Lack of Transportation (Non-Medical):    Physical Activity:      Days of Exercise per Week:      Minutes of Exercise per Session:    Stress:      Feeling of Stress :    Social Connections:      Frequency of Communication with Friends and Family:      Frequency of Social Gatherings with Friends and Family:      Attends Taoism Services:      Active Member of Clubs or Organizations:      Attends Club or Organization Meetings:      Marital Status:    Intimate Partner Violence:      Fear of Current or Ex-Partner:      Emotionally Abused:      Physically Abused:      Sexually Abused:        REVIEW OF SYSTEM: Patient claims he is having issues but the Tylenol is helping and he is having it as needed.  He denies any fevers chills nausea vomit diarrhea change in vision hearing taste or smell weakness one-sided of the chest  pain shortness of breath.  He denies any current shortness stool polyphagia polydipsia polyuria depression or anxiety the main review of systems is negative.      PHYSICAL EXAM: Patient is alert pleasant does not appear to be in acute distress.  Head was normocephalic and atraumatic sclera conjunctiva is clear oromucosa is moist nose no discharge.  Heart sounds were regular lungs were clear to auscultation without any crackles rales or wheezes.  Abdomen was soft nontender.    Extremities examination left knee did not show any signs of cyanosis or edema.  There is little inflammatory redness but did not look angry red at this time.  Incision is healed over pretty well.  Neurologic exam did show cognitive issues however the remainder of the neuro exam was nonfocal.  Affect was pleasant.        LABS: Labs from 723 was as follows hemoglobin was 7.0, white count was 9.6, platelets were 876.  Basic metabolic profile on that date was as follows sodium was 138, potassium 4.3, CO2 is 22, BUN is 50, creatinine 2.02, calcium is 8.5, GFR was 29.      ASSESSMENT:    Encounter Diagnoses   Name Primary?     Status post total left knee replacement Yes     Cellulitis of left knee      Gastrointestinal hemorrhage associated with acute gastritis      Anemia due to blood loss, acute      Chronic obstructive pulmonary disease, unspecified COPD type (H)      Acute metabolic encephalopathy      Stage 3a chronic kidney disease      Primary osteoarthritis of left knee         PLAN: Plan at this time OT will do cognitive testing at this time.  And continue with physical and Occupational Therapy secondary to his knee arthroplasty.  He does not have excellent range of motion on that at this time and that will need to be addressed.    CBC be done tomorrow if not done today and basic metabolic profile be done tomorrow secondary acute kidney injury and anemia.    I will change his Tylenol 650 every 6 hours scheduled at this time without any  addition of any other medications for his pain.    He is currently on Seroquel on next visit if he is not taken any for agitation I will DC that.  I will discuss with nurse if he is needing his Seroquel or not.    I will continue to monitor above medical problems and no other changes to care plan at this time.        Electronically signed by: LORI REYNAGA DO

## 2021-08-04 NOTE — TELEPHONE ENCOUNTER
[facility] Nurse Triage Telephone Note    Provider: Jared Jose DO  Facility: [facility] Facility Type:  TCU    Caller: Blessing  Call Back Number: [phone]    Allergies:    Allergies   Allergen Reactions     Aspirin      Other reaction(s): unknown     Ibuprofen Unknown     Ibuprofen Rash     Tolerated 15mg IV toradol on 5/18/19, no rash        Reason for call: Nurse calling to report Heme 1 and BMP results.  Nurse also states that patient has paranoia and agitation in the evening.  Notable meds:  Hydroxyzine 25mg TID, Metoprolol ER 25mg daily, EC ASA 81mg BID, Seroquel 25mg BID PRN---ends on 8/15/21, Doxycycline 100mg BID---ends on 8/8/21, Protonix 40mg BID, potassium 10meq BID, Torsemide 20mg BID.      Verbal Order/Direction given by Provider: Encourage fluids.  Check Hgb and BMP tomorrow.      Provider giving Order:  Jared Jose DO    Verbal Order given to: Blessing Hale RN

## 2021-08-05 NOTE — LETTER
8/5/2021        RE: Dread Justice  [address]  [address]  [place] [address]        [facility] GERIATRIC SERVICES    Facility:  [facility] ([facility]) [[id number]]  Code Status: DNR      CHIEF COMPLAINT/REASON FOR VISIT:  Chief Complaint   Patient presents with     RECHECK       HISTORY:      HPI: Dread is a 86 year old male is here at the [facility] TCU undergoing physical and Occupational Therapy.  He did have left total knee arthroplasty on 7/7/2021 and did have cellulitis of the knee was treated with antibiotics.  This did improve he also had blood loss anemia requiring 2 units of packed red blood cells hemoglobin dropped to 6.5 any was sent back to the TCU on 727 but then sent back to the hospital for rectal bleeding.  But hemoglobin was stable 8.88.18.5 on discharge.  EGD was deferred but offered.  Aspirin was continued and it is on PPI twice daily.  They did continue on Seroquel at this time and does have chronic disease renal function 1.88 torsemide was reduced to daily with potassium daily.  Is calling tomorrow torsemide twice a day but I will change it to.    He is progressing as anticipated physical occupational therapy had a fall without injuries last evening however he is doing okay at this time.  He denies any fevers chills nausea vomiting diarrhea.  He is drinking fluids well as I asked him to and his kidney function tests are improving.  His weight has gone down 1 60-1 54.    We also discussed did discuss CODE STATUS and he does want to DO NOT RESUSCITATE.  However is unclear on intubation or hospitalization.  But he does not want real heroic cares.  He is not depressed.    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  MRI showed a 4.5 cm infarct in the left cerebellum.     Chronic headaches      COPD (chronic obstructive pulmonary disease) (H)       Depression      Iron deficiency anemia 10/03/2012     Macular degeneration, bilateral      Osteoarthritis      Peptic ulcer disease 1975    Secondary to alcohol use.     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     No Known Problems Daughter         Not in contact.     Social History     Socioeconomic History     Marital status:      Spouse name: Not on file     Number of children: Not on file     Years of education: Not on file     Highest education level: Not on file   Occupational History     Not on file   Tobacco Use     Smoking status: Heavy Tobacco Smoker     Packs/day: 0.50     Types: Cigarettes     Smokeless tobacco: Never Used     Tobacco comment: per day   Substance and Sexual Activity     Alcohol use: No     Drug use: No     Sexual activity: Not on file   Other Topics Concern     Not on file   Social History Narrative    Lives with family      Social Determinants of Health     Financial Resource Strain:      Difficulty of Paying Living Expenses:    Food Insecurity:      Worried About Running Out of Food in the Last Year:      Ran Out of Food in the Last Year:    Transportation Needs:      Lack of Transportation (Medical):      Lack of Transportation (Non-Medical):    Physical Activity:      Days of Exercise per Week:      Minutes of Exercise per Session:    Stress:      Feeling of Stress :    Social Connections:      Frequency of Communication with Friends and Family:      Frequency of Social Gatherings with Friends and Family:      Attends Pentecostalism Services:      Active Member of Clubs or Organizations:      Attends Club or Organization Meetings:      Marital Status:    Intimate Partner Violence:      Fear of Current or Ex-Partner:       Emotionally Abused:      Physically Abused:      Sexually Abused:          REVIEW OF SYSTEM: Patient claims his pain is under adequate control denies any fevers chills nausea vomit diarrhea change in vision hearing taste or smell weakness one-sided other chest pain shortness of breath.  He denies any current shortness stool polyphagia polydipsia polyuria depression or anxiety in the range of the view of systems is negative.      PHYSICAL EXAM: Patient alert pleasant not appear to be in acute distress.  Head was normocephalic and atraumatic sclera conjunctiva is clear oromucosa was moist nose at discharge.  Heart sounds are regular lungs clear abdomen soft nontender bowel sounds are positive in all 4 quadrants.  Extremities without cyanosis in a just a trace edema bilateral.  Neuro exam is nonfocal and affect is pleasant.        LABS: None he did decline yesterday to 2.25 would push fluids overnight and his BUN did improve from 70-60 and his creatinine did improve from yesterday's today from 2.25-1.96.  He still has a ways to go but I do not to give him do not need to give him IV fluids at this time.  Calcium is normal range and GFR was 30 up from 22 yesterday.  Globin was 8.1 and recheck is tomorrow.      ASSESSMENT:   Encounter Diagnoses   Name Primary?     Chronic obstructive pulmonary disease, unspecified COPD type (H) Yes     Acute metabolic encephalopathy      Cellulitis of left knee      Stage 3a chronic kidney disease      Acute kidney injury (H)      Anemia due to blood loss, acute         PLAN: Time I was notified of the basic metabolic profile so we will continue to push fluids at this time and will check a basic metabolic profile tomorrow also check platelets tomorrow for likely reactive thrombocytosis and will change torsemide to 20 mg daily instead of twice a day.  Given his weight loss and his kidney function trending worse before you IV fluids we will try that first.  To monitor weights every day  and I will be notified of any 2 pound weight change.  We will continue with physical occupational therapy and hemoglobin be done tomorrow.        Electronically signed by: LORI REYNAGA DO        Sincerely,        LORI REYNAGA DO

## 2021-08-05 NOTE — PROGRESS NOTES
[facility] GERIATRIC SERVICES    Facility:  [facility] ([facility]) [[id number]]  Code Status: DNR      CHIEF COMPLAINT/REASON FOR VISIT:  Chief Complaint   Patient presents with     RECHECK       HISTORY:      HPI: Dread is a 86 year old male is here at the [facility] TCU undergoing physical and Occupational Therapy.  He did have left total knee arthroplasty on 7/7/2021 and did have cellulitis of the knee was treated with antibiotics.  This did improve he also had blood loss anemia requiring 2 units of packed red blood cells hemoglobin dropped to 6.5 any was sent back to the TCU on 727 but then sent back to the hospital for rectal bleeding.  But hemoglobin was stable 8.88.18.5 on discharge.  EGD was deferred but offered.  Aspirin was continued and it is on PPI twice daily.  They did continue on Seroquel at this time and does have chronic disease renal function 1.88 torsemide was reduced to daily with potassium daily.  Is calling tomorrow torsemide twice a day but I will change it to.    He is progressing as anticipated physical occupational therapy had a fall without injuries last evening however he is doing okay at this time.  He denies any fevers chills nausea vomiting diarrhea.  He is drinking fluids well as I asked him to and his kidney function tests are improving.  His weight has gone down 1 60-1 54.    We also discussed did discuss CODE STATUS and he does want to DO NOT RESUSCITATE.  However is unclear on intubation or hospitalization.  But he does not want real heroic cares.  He is not depressed.    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  MRI showed a 4.5 cm infarct in the left cerebellum.     Chronic headaches      COPD (chronic obstructive pulmonary disease) (H)      Depression      Iron deficiency anemia 10/03/2012     Macular degeneration, bilateral       Osteoarthritis      Peptic ulcer disease 1975    Secondary to alcohol use.     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     No Known Problems Daughter         Not in contact.     Social History     Socioeconomic History     Marital status:      Spouse name: Not on file     Number of children: Not on file     Years of education: Not on file     Highest education level: Not on file   Occupational History     Not on file   Tobacco Use     Smoking status: Heavy Tobacco Smoker     Packs/day: 0.50     Types: Cigarettes     Smokeless tobacco: Never Used     Tobacco comment: per day   Substance and Sexual Activity     Alcohol use: No     Drug use: No     Sexual activity: Not on file   Other Topics Concern     Not on file   Social History Narrative    Lives with family      Social Determinants of Health     Financial Resource Strain:      Difficulty of Paying Living Expenses:    Food Insecurity:      Worried About Running Out of Food in the Last Year:      Ran Out of Food in the Last Year:    Transportation Needs:      Lack of Transportation (Medical):      Lack of Transportation (Non-Medical):    Physical Activity:      Days of Exercise per Week:      Minutes of Exercise per Session:    Stress:      Feeling of Stress :    Social Connections:      Frequency of Communication with Friends and Family:      Frequency of Social Gatherings with Friends and Family:      Attends Am[employer] Services:      Active Member of Clubs or Organizations:      Attends Club or Organization Meetings:      Marital Status:    Intimate Partner Violence:      Fear of Current or Ex-Partner:      Emotionally Abused:      Physically Abused:      Sexually Abused:          REVIEW OF  SYSTEM: Patient claims his pain is under adequate control denies any fevers chills nausea vomit diarrhea change in vision hearing taste or smell weakness one-sided other chest pain shortness of breath.  He denies any current shortness stool polyphagia polydipsia polyuria depression or anxiety in the range of the view of systems is negative.      PHYSICAL EXAM: Patient alert pleasant not appear to be in acute distress.  Head was normocephalic and atraumatic sclera conjunctiva is clear oromucosa was moist nose at discharge.  Heart sounds are regular lungs clear abdomen soft nontender bowel sounds are positive in all 4 quadrants.  Extremities without cyanosis in a just a trace edema bilateral.  Neuro exam is nonfocal and affect is pleasant.        LABS: None he did decline yesterday to 2.25 would push fluids overnight and his BUN did improve from 70-60 and his creatinine did improve from yesterday's today from 2.25-1.96.  He still has a ways to go but I do not to give him do not need to give him IV fluids at this time.  Calcium is normal range and GFR was 30 up from 22 yesterday.  Globin was 8.1 and recheck is tomorrow.      ASSESSMENT:   Encounter Diagnoses   Name Primary?     Chronic obstructive pulmonary disease, unspecified COPD type (H) Yes     Acute metabolic encephalopathy      Cellulitis of left knee      Stage 3a chronic kidney disease      Acute kidney injury (H)      Anemia due to blood loss, acute         PLAN: Time I was notified of the basic metabolic profile so we will continue to push fluids at this time and will check a basic metabolic profile tomorrow also check platelets tomorrow for likely reactive thrombocytosis and will change torsemide to 20 mg daily instead of twice a day.  Given his weight loss and his kidney function trending worse before you IV fluids we will try that first.  To monitor weights every day and I will be notified of any 2 pound weight change.  We will continue with physical  occupational therapy and hemoglobin be done tomorrow.        Electronically signed by: LORI REYNAGA DO

## 2021-08-13 NOTE — LETTER
8/12/2021        RE: Dread Justice  [address]  [address]  [place] [address]        [facility] GERIATRIC SERVICES    Facility:  [facility] ([facility]) [[id number]]  Code Status: DNR      CHIEF COMPLAINT/REASON FOR VISIT:  Chief Complaint   Patient presents with     Hospital F/U       HPI:   Dread is a 86 year old male who was residing here at the [facility] undergoing physical and occupational therapy secondary to left total knee arthroplasty.  He was then sent back to the hospital few times secondary to acute GI bleeding and he was given a blood transfusion in the hospital.  He was then discharged back here on proton pump inhibitor.  During that time he does have chronic kidney disease but worsening of creatinine.  I did want him IV fluids here but staff was somewhat uncomfortable with that at this time.  So I recommended discharge to the hospital.  He also has some delirium at this time so something else could have been going on.  Creatinine was 2.74 in the hospital he was given 1.25 L intravenous fluids and this did improve.  It did dilute his hemoglobin a little bit but he has a history of chronic kidney disease stage III and he follows with nephrology.  He was confused and sentences did not make any sense and he was put on Seroquel he did see psychiatry in the hospital and medications were changed.  I am going to clarify them to 50 mg at night of Seroquel 25 mg twice daily as needed for agitation.  We can move from there at this time because the med reconciliation indicates likely too much Seroquel at this time.  His baseline creatinine is 1.5-1.8 and will be checking rechecking that on Monday.  He continues his Toprol-XL 25 mg daily and his COPD did remain stable.    He did have a recent admission and GI bleed hemoglobin was stable at 7.7.  He does remain on Protonix twice daily and no signs of any bleeding.  According to notes the ferrous sulfate was DC'd for some reason.  I will  start that again secondary to his low hemoglobin.    According to therapy staff he is was very confused yesterday.  Difficult to do therapy with him and he does have periods of agitation.  They do not feel as he is appropriate for therapy at this time.  He is a DNR/DNI and there is no listed power of  just a friend of his emergency contact.  He does claim however he is not in any pain but because of his dementia versus delirium is unclear what is going on with him at this time.    I believe he is more confused than he was when he first came in from his knee.    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  MRI showed a 4.5 cm infarct in the left cerebellum.     Chronic headaches      COPD (chronic obstructive pulmonary disease) (H)      Depression      Iron deficiency anemia 10/03/2012     Macular degeneration, bilateral      Osteoarthritis      Peptic ulcer disease 1975    Secondary to alcohol use.     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  Diverticulosis, internal hemorrhoids.     ESOPHAGOSCOPY, GASTROSCOPY, DUODENOSCOPY (EGD), COMBINED  10/04/2012    Chronic gastritis.     INGUINAL HERNIA REPAIR Right 7/8/2014    Procedure: HERNIA REPAIR INGUINAL, RIGHT;  Surgeon: Mack Kowalski MD;  Location: [facility];  Service:      PICC  12/2/2014          TONSILLECTOMY  1949       Family History:   Family History   Problem Relation Age of Onset     Heart Failure Mother 93.00      Cerebrovascular Disease Father 79.00     Coronary Artery Disease Father      Cancer Sister 76.00     No Known Problems Son         Not in contact.     No Known Problems Daughter         Not in contact.       Social History:    Social History     Socioeconomic History     Marital status:      Spouse name: Not on file     Number of children: Not on file     Years of education: Not on file     Highest education level: Not on file   Occupational History     Not on file   Tobacco Use     Smoking status: Heavy Tobacco Smoker     Packs/day: 0.50     Types: Cigarettes     Smokeless tobacco: Never Used     Tobacco comment: per day   Substance and Sexual Activity     Alcohol use: No     Drug use: No     Sexual activity: Not on file   Other Topics Concern     Not on file   Social History Narrative    Lives with family      Social Determinants of Health     Financial Resource Strain:      Difficulty of Paying Living Expenses:    Food Insecurity:      Worried About Running Out of Food in the Last Year:      Ran Out of Food in the Last Year:    Transportation Needs:      Lack of Transportation (Medical):      Lack of Transportation (Non-Medical):    Physical Activity:      Days of Exercise per Week:      Minutes of Exercise per Session:    Stress:      Feeling of Stress :    Social Connections:      Frequency of Communication with Friends and Family:      Frequency of Social Gatherings with Friends and Family:      Attends [employer] Services:      Active Member of Clubs or Organizations:      Attends Club or Organization Meetings:      Marital Status:    Intimate Partner Violence:      Fear of Current or Ex-Partner:      Emotionally Abused:      Physically Abused:      Sexually Abused:        REVIEW OF SYSTEM: Patient denies any knee pain or pain in general and denies any fevers chills nausea vomiting diarrhea change in vision hearing taste or smell weakness one-sided chest pain shortness of breath.  He says he  sleeping well and likely he is on too much Seroquel at this time.  He denies any other problems but review of systems difficult secondary to patient's dementia.      PHYSICAL EXAM: Alert confused but no acute distress.  Head was normocephalic and atraumatic sclera conjunctiva sclera mucosa was moist.  Nose no discharge.  Heart sounds were sent but appeared regular at this time and lungs were clear to auscultation.  Legs did not show any signs of cyanosis or edema.  There is no calf tenderness and Homans' sign was negative.  Neuro exam was baseline with the exception of his mental status declined.        LABS: On 8/5/2021 the lab I had for hemoglobin was 8.1.  Creatinine was 2.55.  In the hospital on 8/11/2020 and creatinine was 1.42, please 307, hemoglobin was 7.7 and went from 8.2-8.5 and then to 9.0 then to 8.8 then to 7.9.  White count was 12.4 on admission 7.0 on 8/8/2021.    Vital; blood pressure 111/60    Pulse of 63    Temperature is 98.2    Respirations 18    O2 sats 99% on room air.      ASSESSMENT:    Encounter Diagnoses   Name Primary?     Status post total left knee replacement Yes     Cellulitis of left knee      Stage 3a chronic kidney disease      Anemia due to blood loss, acute      Acute metabolic encephalopathy      Acute kidney injury (H)         PLAN: At this time I will clarify Seroquel was 50 mg at night is okay 25 mg twice daily as needed for agitation no more than that.  Hemoglobin today is secondary to anemia PT OT will evaluate and treat and test for cognition.  Ferrous sulfate 25 mg twice daily will be started that was stopped secondary anemia that is essential.  Basic metabolic profile be Monday second Pb to acute kidney injury and CBC today secondary to confusion.  We will continue to monitor above medical problems and no other changes to care plan at this time.        Electronically signed by: LORI REYNAGA DO        Sincerely,        LORI REYNAGA DO

## 2021-08-17 NOTE — LETTER
8/17/2021        RE: Dread Justice  [address]  [address]  [place] [address]        [facility] GERIATRIC SERVICES    Facility:  [facility] ([facility]) [[id number]]  Code Status: DNR      CHIEF COMPLAINT/REASON FOR VISIT:  Chief Complaint   Patient presents with     RECHECK       HISTORY:      HPI: Dread is a 86 year old male to [facility] undergoing physical occupational therapy multiple medical problems original hospitalization was for left total knee arthroplasty.  But he was sent back a couple times for GI bleeds and blood transfusion was given in the hospital.  He is discharged on proton pump inhibitor.  He had worsening creatinine which did incorporate another hospitalization and was given IV fluids and this did get better.  This is getting worse.  He is acute he was put on Seroquel did see psychiatry in the hospital medications were changed.  Likely too much Seroquel at this time baseline creatinine is 1.5-1.8 and basic metabolic profile was not done that I ordered.  He also has COPD which is remained stable and patient has been declining with his mental status.    Patient's mental status has declined.  Dementia has gotten worse and he is in delirium.  Could be the Seroquel situation at this time I am unsure at this time.  But he is moving all 4 extremities.  He is extremely agitated at this time.  Graph on last visit document the 13th he was alert but somewhat confused.  He is much worse today.  He is alert but he does does mumble and his speech is very slurred.  He does not have an obvious facial droop and his handgrips are symmetrical.  He does move his lower extremities but does not follow commands that well.  He has been afebrile at this time and O2 sats are okay at 92% with respiratory rate of 18.  His lungs does have some fine crackles at the bases but otherwise clear.  Heart rate rhythm regular.    Clearly he is altered mental status at this time and I did decide to send him  to the hospital for further work-up and treatment.  Etiology includes infectious possible or could be med related with Seroquel.  Is not on the oxycodone anymore and he is on trazodone and trazodone and Seroquel.    Other indications include acute kidney injury or electrolyte disturbances at this time.  Work-up here at the TCU would likely take till tomorrow and he would be better served in the hospital for quicker work-up.    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  MRI showed a 4.5 cm infarct in the left cerebellum.     Chronic headaches      COPD (chronic obstructive pulmonary disease) (H)      Depression      Iron deficiency anemia 10/03/2012     Macular degeneration, bilateral      Osteoarthritis      Peptic ulcer disease 1975    Secondary to alcohol use.     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     No Known Problems Daughter         Not in contact.     Social History     Socioeconomic History     Marital status:      Spouse name: Not on file     Number of children: Not on file     Years of education: Not on file     Highest education level: Not on file   Occupational History     Not on file   Tobacco Use     Smoking status: Heavy Tobacco Smoker     Packs/day: 0.50     Types: Cigarettes     Smokeless tobacco: Never Used     Tobacco comment: per day   Substance and Sexual Activity     Alcohol use: No     Drug use: No     Sexual activity: Not on file   Other Topics Concern     Not on file   Social History Narrative    Lives with family      Social Determinants of Health     Financial Resource Strain:      Difficulty of  Paying Living Expenses:    Food Insecurity:      Worried About Running Out of Food in the Last Year:      Ran Out of Food in the Last Year:    Transportation Needs:      Lack of Transportation (Medical):      Lack of Transportation (Non-Medical):    Physical Activity:      Days of Exercise per Week:      Minutes of Exercise per Session:    Stress:      Feeling of Stress :    Social Connections:      Frequency of Communication with Friends and Family:      Frequency of Social Gatherings with Friends and Family:      Attends [employer] Services:      Active Member of Clubs or Organizations:      Attends Club or Organization Meetings:      Marital Status:    Intimate Partner Violence:      Fear of Current or Ex-Partner:      Emotionally Abused:      Physically Abused:      Sexually Abused:          REVIEW OF SYSTEM: Unable to do review of systems secondary to acute delirium and altered mental status.      PHYSICAL EXAM: He is alert but clearly severely confused.  He is agitated at this time.  Heart sounds were distant lungs were clear to auscultation with exception of fine crackles at the bases.  Abdomen soft nontender.  Extremities showed 1+ pitting edema.        LABS: Sick metabolic profile was done on 8 5 which her sodium potassium were normal BUN was 81 creatinine was 2.55 and GFR was 22.      ASSESSMENT:   Encounter Diagnoses   Name Primary?     Status post total left knee replacement Yes     Stage 3a chronic kidney disease      Acute metabolic encephalopathy      Chronic obstructive pulmonary disease, unspecified COPD type (H)      Anemia due to blood loss, acute      Cellulitis of left knee      Acute kidney injury (H)         PLAN: Plan at this time recommend sent to the hospital for evaluation of possible acute kidney injury with also infectious etiology for his altered mental status.  The diagnosis for sending to the hospital altered mental status.  We will prepare him for his send into the hospital at this  time.        Electronically signed by: LORI REYNAGA DO        Sincerely,        LORI REYNAGA DO

## 2021-09-15 NOTE — TELEPHONE ENCOUNTER
"Contacted patient, he believes he has been taking potassium, \"it's the big yellow one\". He asked that I call his friend Moi Sreedhar, as Moi help him with all things due to his age and blindness issues.   Moi will check this afternoon. I will talk with Dr. Nascimento as well.   I suggested that patient call PCP as she may want him to increase K+ for a few days.   Aidee Adams RN     "
----- Message from Mera Nascimento MD sent at 6/29/2020  3:15 PM CDT -----  Kurtis Hoskins/Carmelita,    Could you please try to get through to Mr. Justice.  His potassium is low at 2.9.  I think this is because of the diuretic that he is taking.  He is supposed to be taking a potassium chloride supplement.  I doubt that he is taking it.  I tried calling him on his phone but the call does not get picked up.  Could you try to call him again a bit later and try to persuade him to take the potassium chloride tablets.    Thanks    Mera Nascimento MD    
Friend Moi went to patient home, he says patient has been taking the potassium pill.   Per Dr. Nascimento, patient will take K+ 10 meq two tabs daily until he is seen here in f/u on 7/06.  Patient will start 24 hour urine on 7/01 and Moi will bring here on 7/02.   Aidee Adams RN    
Left message for patient to return call.   Aidee Adams RN    
WILFRED Levine NP

## 2022-01-18 VITALS — SYSTOLIC BLOOD PRESSURE: 155 MMHG | HEART RATE: 62 BPM | DIASTOLIC BLOOD PRESSURE: 69 MMHG | OXYGEN SATURATION: 93 %

## 2022-01-18 VITALS
HEIGHT: 66 IN | BODY MASS INDEX: 30.34 KG/M2 | SYSTOLIC BLOOD PRESSURE: 179 MMHG | OXYGEN SATURATION: 95 % | TEMPERATURE: 97.9 F | WEIGHT: 188.8 LBS | DIASTOLIC BLOOD PRESSURE: 75 MMHG | HEART RATE: 66 BPM

## 2022-01-18 VITALS
OXYGEN SATURATION: 94 % | DIASTOLIC BLOOD PRESSURE: 67 MMHG | TEMPERATURE: 98.3 F | HEART RATE: 73 BPM | SYSTOLIC BLOOD PRESSURE: 147 MMHG | BODY MASS INDEX: 29.25 KG/M2 | WEIGHT: 178.5 LBS

## 2022-01-18 VITALS
OXYGEN SATURATION: 95 % | TEMPERATURE: 98.3 F | WEIGHT: 188.3 LBS | SYSTOLIC BLOOD PRESSURE: 178 MMHG | BODY MASS INDEX: 30.86 KG/M2 | DIASTOLIC BLOOD PRESSURE: 74 MMHG | HEART RATE: 67 BPM

## 2022-01-18 VITALS — HEIGHT: 66 IN | BODY MASS INDEX: 27.8 KG/M2 | WEIGHT: 173 LBS
